# Patient Record
Sex: MALE | Race: WHITE | Employment: FULL TIME | URBAN - METROPOLITAN AREA
[De-identification: names, ages, dates, MRNs, and addresses within clinical notes are randomized per-mention and may not be internally consistent; named-entity substitution may affect disease eponyms.]

---

## 2017-02-03 RX ORDER — LOSARTAN POTASSIUM 100 MG/1
TABLET ORAL
Qty: 30 TABLET | Refills: 0 | Status: SHIPPED | OUTPATIENT
Start: 2017-02-03 | End: 2017-04-05 | Stop reason: SDUPTHER

## 2017-04-06 RX ORDER — LOSARTAN POTASSIUM 100 MG/1
TABLET ORAL
Qty: 30 TABLET | Refills: 0 | Status: SHIPPED | OUTPATIENT
Start: 2017-04-06 | End: 2017-05-08 | Stop reason: SDUPTHER

## 2017-05-09 RX ORDER — LOSARTAN POTASSIUM 100 MG/1
TABLET ORAL
Qty: 30 TABLET | Refills: 3 | Status: SHIPPED | OUTPATIENT
Start: 2017-05-09 | End: 2017-10-08 | Stop reason: SDUPTHER

## 2017-10-09 RX ORDER — LOSARTAN POTASSIUM 100 MG/1
TABLET ORAL
Qty: 30 TABLET | Refills: 3 | Status: SHIPPED | OUTPATIENT
Start: 2017-10-09 | End: 2017-10-24 | Stop reason: SDUPTHER

## 2017-10-09 NOTE — TELEPHONE ENCOUNTER
Health Maintenance   Topic Date Due    HIV screen  01/13/1994    DTaP/Tdap/Td vaccine (1 - Tdap) 01/13/1998    Flu vaccine (1) 09/01/2017       No results found for: LABA1C          ( goal A1C is < 7)   No results found for: LABMICR  LDL Cholesterol (mg/dL)   Date Value   08/27/2013 104 (H)       (goal LDL is <100)   AST (U/L)   Date Value   08/27/2013 28     ALT (U/L)   Date Value   08/27/2013 25     BUN (mg/dL)   Date Value   08/27/2013 16     BP Readings from Last 3 Encounters:   06/28/16 (!) 140/94   07/31/15 (!) 166/100   04/10/15 (!) 144/98          (goal 120/80)    All Future Testing planned in [facility]      Next Visit Date:  No future appointments. There is no problem list on file for this patient.

## 2017-10-16 ENCOUNTER — TELEPHONE (OUTPATIENT)
Dept: FAMILY MEDICINE CLINIC | Age: 37
End: 2017-10-16

## 2017-10-16 NOTE — TELEPHONE ENCOUNTER
The patient is calling to check the status for a request for refills for his Losartan. The patient stated that [facility] have been trying ot reach out several times to get some questions answered for his refills. Please contact patient about the status [phone].

## 2017-10-24 ENCOUNTER — OFFICE VISIT (OUTPATIENT)
Dept: FAMILY MEDICINE CLINIC | Age: 37
End: 2017-10-24
Payer: COMMERCIAL

## 2017-10-24 VITALS
WEIGHT: 256 LBS | SYSTOLIC BLOOD PRESSURE: 136 MMHG | HEIGHT: 72 IN | OXYGEN SATURATION: 98 % | BODY MASS INDEX: 34.67 KG/M2 | HEART RATE: 76 BPM | DIASTOLIC BLOOD PRESSURE: 86 MMHG | RESPIRATION RATE: 16 BRPM

## 2017-10-24 DIAGNOSIS — I10 ESSENTIAL HYPERTENSION: Primary | ICD-10-CM

## 2017-10-24 PROCEDURE — 99213 OFFICE O/P EST LOW 20 MIN: CPT | Performed by: FAMILY MEDICINE

## 2017-10-24 RX ORDER — LOSARTAN POTASSIUM AND HYDROCHLOROTHIAZIDE 25; 100 MG/1; MG/1
1 TABLET ORAL DAILY
Qty: 90 TABLET | Refills: 1 | Status: SHIPPED | OUTPATIENT
Start: 2017-10-24 | End: 2018-06-01 | Stop reason: SDUPTHER

## 2017-10-24 ASSESSMENT — PATIENT HEALTH QUESTIONNAIRE - PHQ9
SUM OF ALL RESPONSES TO PHQ9 QUESTIONS 1 & 2: 0
2. FEELING DOWN, DEPRESSED OR HOPELESS: 0
1. LITTLE INTEREST OR PLEASURE IN DOING THINGS: 0
SUM OF ALL RESPONSES TO PHQ QUESTIONS 1-9: 0

## 2018-01-29 ENCOUNTER — OFFICE VISIT (OUTPATIENT)
Dept: FAMILY MEDICINE CLINIC | Age: 38
End: 2018-01-29
Payer: COMMERCIAL

## 2018-01-29 VITALS
WEIGHT: 258 LBS | DIASTOLIC BLOOD PRESSURE: 86 MMHG | HEART RATE: 90 BPM | BODY MASS INDEX: 34.99 KG/M2 | SYSTOLIC BLOOD PRESSURE: 116 MMHG

## 2018-01-29 DIAGNOSIS — M77.9 TENDONITIS: ICD-10-CM

## 2018-01-29 DIAGNOSIS — I10 ESSENTIAL HYPERTENSION: Primary | ICD-10-CM

## 2018-01-29 PROCEDURE — 99213 OFFICE O/P EST LOW 20 MIN: CPT | Performed by: FAMILY MEDICINE

## 2018-01-29 ASSESSMENT — ENCOUNTER SYMPTOMS
COUGH: 0
DIARRHEA: 0
ABDOMINAL PAIN: 0
BACK PAIN: 0
SHORTNESS OF BREATH: 0
CONSTIPATION: 0
WHEEZING: 0
BLOOD IN STOOL: 0

## 2018-01-29 NOTE — PROGRESS NOTES
Ny Agrawal is a 43 y.o. male who presents today for his medical conditions/complaints as noted below. Ny Agrawal is c/o of Hypertension    Routine follow up on PL he is doing well. HPI:     HYPERTENSION visit     BP Readings from Last 3 Encounters:   01/29/18 116/86   10/24/17 136/86   06/28/16 (!) 140/94       LDL Cholesterol (mg/dL)   Date Value   08/27/2013 104 (H)     HDL (mg/dL)   Date Value   08/27/2013 35 (L)     BUN (mg/dL)   Date Value   08/27/2013 16     CREATININE (mg/dL)   Date Value   08/27/2013 0.91     Glucose (mg/dL)   Date Value   08/27/2013 89   12/22/2011 108 (H)              Have you changed or started any medications since your last visit including any over-the-counter medicines, vitamins, or herbal medicines? no   Have you stopped taking any of your medications? Is so, why? -  no  Are you having any side effects from any of your medications? - no    Have you seen any other physician or provider since your last visit?  no   Have you had any other diagnostic tests since your last visit?  no   Have you been seen in the emergency room and/or had an admission in a hospital since we last saw you?  no   Have you had your routine dental cleaning in the past 6 months?  yes -      Do you have an active MyChart account? If no, what is the barrier? No:     Patient Care Team:  Ernst Cabrales MD as PCP - General (Family Medicine)    Medical History Review  Past Medical, Family, and Social History reviewed and  contribute to the patient presenting condition    Health Maintenance   Topic Date Due    HIV screen  01/13/1994    DTaP/Tdap/Td vaccine (1 - Tdap) 01/13/1998    Potassium monitoring  12/22/2012    Creatinine monitoring  12/22/2012    Flu vaccine (1) 09/01/2017         Past Medical History:   Diagnosis Date    Hypertension       No past surgical history on file.   Family History   Problem Relation Age of Onset    Cancer Mother     High Blood Pressure Father      Social History

## 2018-06-03 RX ORDER — LOSARTAN POTASSIUM AND HYDROCHLOROTHIAZIDE 25; 100 MG/1; MG/1
1 TABLET ORAL DAILY
Qty: 90 TABLET | Refills: 1 | Status: SHIPPED | OUTPATIENT
Start: 2018-06-03 | End: 2018-11-29 | Stop reason: SDUPTHER

## 2019-03-30 NOTE — LETTER
Carl Hancock DO  [facility]  [address]  [address] [facility] [address]    4/1/2019    [address] [facility] [address]        Dear Josephine Redmond : In addition to helping you feel better when you are sick, we are interested in preventing illness and injury in the first place. In the spirit of maintaining your good health, your record indicates that you are due for an appointment. If you have changed physicians since Dr Emmet Shone [facility], please call the above number so we can update your record or schedule your appointment. Please call [phone] to schedule. I look forward to seeing you soon.     Sincerely,       Carl Hancock DO/mb

## 2019-04-01 RX ORDER — LOSARTAN POTASSIUM AND HYDROCHLOROTHIAZIDE 25; 100 MG/1; MG/1
TABLET ORAL
Qty: 90 TABLET | Refills: 0 | Status: SHIPPED | OUTPATIENT
Start: 2019-04-01 | End: 2019-07-01 | Stop reason: SDUPTHER

## 2019-04-01 NOTE — TELEPHONE ENCOUNTER
Last visit: 1/29/18  Last Med refill: 11/29/18  Does patient have enough medication for 72 hours:     Next Visit Date:  No future appointments.     Health Maintenance   Topic Date Due    HIV screen  01/13/1994    DTaP/Tdap/Td vaccine (1 - Tdap) 01/13/1998    Potassium monitoring  12/22/2012    Creatinine monitoring  12/22/2012    Lipid screen  01/13/2019    Flu vaccine (Season Ended) 09/01/2019       No results found for: LABA1C          ( goal A1C is < 7)   No results found for: LABMICR  LDL Cholesterol (mg/dL)   Date Value   08/27/2013 104 (H)   12/22/2011 119 (H)       (goal LDL is <100)   AST (U/L)   Date Value   08/27/2013 28     ALT (U/L)   Date Value   08/27/2013 25     BUN (mg/dL)   Date Value   08/27/2013 16     BP Readings from Last 3 Encounters:   01/29/18 116/86   10/24/17 136/86   06/28/16 (!) 140/94          (goal 120/80)    All Future Testing planned in [facility]  Lab Frequency Next Occurrence               Patient Active Problem List:     Essential hypertension

## 2019-04-11 ENCOUNTER — OFFICE VISIT (OUTPATIENT)
Dept: FAMILY MEDICINE CLINIC | Age: 39
End: 2019-04-11
Payer: COMMERCIAL

## 2019-04-11 VITALS
BODY MASS INDEX: 34.56 KG/M2 | WEIGHT: 254.8 LBS | DIASTOLIC BLOOD PRESSURE: 84 MMHG | HEART RATE: 83 BPM | SYSTOLIC BLOOD PRESSURE: 126 MMHG | OXYGEN SATURATION: 98 %

## 2019-04-11 DIAGNOSIS — Z00.00 ROUTINE ADULT HEALTH MAINTENANCE: ICD-10-CM

## 2019-04-11 DIAGNOSIS — R10.32 LEFT LOWER QUADRANT PAIN: ICD-10-CM

## 2019-04-11 DIAGNOSIS — I10 ESSENTIAL HYPERTENSION: Primary | ICD-10-CM

## 2019-04-11 PROCEDURE — 99213 OFFICE O/P EST LOW 20 MIN: CPT | Performed by: FAMILY MEDICINE

## 2019-04-11 ASSESSMENT — PATIENT HEALTH QUESTIONNAIRE - PHQ9
SUM OF ALL RESPONSES TO PHQ QUESTIONS 1-9: 0
SUM OF ALL RESPONSES TO PHQ QUESTIONS 1-9: 0
1. LITTLE INTEREST OR PLEASURE IN DOING THINGS: 0
2. FEELING DOWN, DEPRESSED OR HOPELESS: 0
SUM OF ALL RESPONSES TO PHQ9 QUESTIONS 1 & 2: 0

## 2019-04-11 NOTE — PROGRESS NOTES
Subjective:      Patient ID: Jasmina Garza is a 35 y.o. male. HPI    Here for evaluation of HTN    Notes that he is concerned about recent recalls but his particular lot is not part of the recall. Currently on losartan/HCTZ  Complains of left sided hip pain and goes into the abdomen. No injury. This has been occurring for a few months. Comes and goes. Generally most noticeable when he is walking, especially up and down stairs. No relieving factors. Review of Systems   Except as noted in HPI, ROS negative    Objective:   Physical Exam   Constitutional: He appears well-developed and well-nourished. No distress. HENT:   Head: Normocephalic and atraumatic. Eyes: Conjunctivae are normal.   Cardiovascular: Normal rate, regular rhythm and normal heart sounds. Pulmonary/Chest: Effort normal. He has no wheezes. Abdominal: Soft. He exhibits no distension. There is tenderness (very slight pain LLQ). Musculoskeletal: He exhibits no edema. No pain with ROM of the left hip. No reproducible pain with palpation of the left hip   Neurological: He is alert. Skin: Skin is warm and dry. Psychiatric: He has a normal mood and affect. His behavior is normal. Judgment and thought content normal.   Vitals reviewed. Assessment:      1. Essential hypertension    2. Left lower quadrant pain    3. Routine adult health maintenance          Plan:      1. Well controlled. We'll continue the losartan for now. We discussed if his does end up getting recalled we'll just switch it to something like olmesartan or irbesartan with HCTZ  2. He had this pain years ago and got a CT abdomen by chart review. Shown to have epiploic appendagitis. This could be this again. He does not want to work it up right now but we may consider abdominal imaging or hip xray if he desires.          Karyn Navarro, DO

## 2019-04-11 NOTE — PROGRESS NOTES
HYPERTENSION visit     BP Readings from Last 3 Encounters:   01/29/18 116/86   10/24/17 136/86   06/28/16 (!) 140/94       LDL Cholesterol (mg/dL)   Date Value   08/27/2013 104 (H)     HDL (mg/dL)   Date Value   08/27/2013 35 (L)     BUN (mg/dL)   Date Value   08/27/2013 16     CREATININE (mg/dL)   Date Value   08/27/2013 0.91     Glucose (mg/dL)   Date Value   08/27/2013 89   12/22/2011 108 (H)              Have you changed or started any medications since your last visit including any over-the-counter medicines, vitamins, or herbal medicines? no   Have you stopped taking any of your medications? Is so, why? -  no  Are you having any side effects from any of your medications? - no  How often do you miss doses of your medication? rare      Have you seen any other physician or provider since your last visit?  no   Have you had any other diagnostic tests since your last visit?  no   Have you been seen in the emergency room and/or had an admission in a hospital since we last saw you?  no   Have you had your routine dental cleaning in the past 6 months?  yes -      Do you have an active MyChart account? If no, what is the barrier?   No:     Patient Care Team:  Julienne Cervantes DO as PCP - Mark Mccrary MD as PCP - [facility] Attributed Provider    Medical History Review  Past Medical, Family, and Social History reviewed and  contribute to the patient presenting condition    Health Maintenance   Topic Date Due    HIV screen  01/13/1994    DTaP/Tdap/Td vaccine (1 - Tdap) 01/13/1998    Potassium monitoring  12/22/2012    Creatinine monitoring  12/22/2012    Lipid screen  01/13/2019    Flu vaccine (Season Ended) 09/01/2019    Pneumococcal 0-64 years Vaccine  Aged Out

## 2019-04-18 ENCOUNTER — HOSPITAL ENCOUNTER (EMERGENCY)
Age: 39
Discharge: HOME | End: 2019-04-18
Attending: EMERGENCY MEDICINE
Payer: COMMERCIAL

## 2019-04-18 ENCOUNTER — HOSPITAL ENCOUNTER (OUTPATIENT)
Age: 39
Discharge: HOME | End: 2019-04-18
Payer: COMMERCIAL

## 2019-04-18 VITALS
BODY MASS INDEX: 33.18 KG/M2 | WEIGHT: 245 LBS | HEART RATE: 88 BPM | RESPIRATION RATE: 14 BRPM | OXYGEN SATURATION: 96 % | TEMPERATURE: 98.4 F | DIASTOLIC BLOOD PRESSURE: 107 MMHG | HEIGHT: 72 IN | SYSTOLIC BLOOD PRESSURE: 148 MMHG

## 2019-04-18 DIAGNOSIS — Z00.00 ROUTINE ADULT HEALTH MAINTENANCE: ICD-10-CM

## 2019-04-18 DIAGNOSIS — S01.511A LIP LACERATION, INITIAL ENCOUNTER: Primary | ICD-10-CM

## 2019-04-18 LAB
ALBUMIN SERPL-MCNC: 4.4 G/DL (ref 3.5–5.2)
ALBUMIN/GLOBULIN RATIO: 1.4 (ref 1–2.5)
ALP BLD-CCNC: 110 U/L (ref 40–129)
ALT SERPL-CCNC: 32 U/L (ref 5–41)
ANION GAP SERPL CALCULATED.3IONS-SCNC: 12 MMOL/L (ref 9–17)
AST SERPL-CCNC: 25 U/L
BILIRUB SERPL-MCNC: 0.4 MG/DL (ref 0.3–1.2)
BUN BLDV-MCNC: 18 MG/DL (ref 6–20)
BUN/CREAT BLD: ABNORMAL (ref 9–20)
CALCIUM SERPL-MCNC: 9.3 MG/DL (ref 8.6–10.4)
CHLORIDE BLD-SCNC: 105 MMOL/L (ref 98–107)
CHOLESTEROL/HDL RATIO: 5.9
CHOLESTEROL: 200 MG/DL
CO2: 23 MMOL/L (ref 20–31)
CREAT SERPL-MCNC: 0.94 MG/DL (ref 0.7–1.2)
GFR AFRICAN AMERICAN: >60 ML/MIN
GFR NON-AFRICAN AMERICAN: >60 ML/MIN
GFR SERPL CREATININE-BSD FRML MDRD: ABNORMAL ML/MIN/{1.73_M2}
GFR SERPL CREATININE-BSD FRML MDRD: ABNORMAL ML/MIN/{1.73_M2}
GLUCOSE BLD-MCNC: 105 MG/DL (ref 70–99)
HDLC SERPL-MCNC: 34 MG/DL
LDL CHOLESTEROL: 129 MG/DL (ref 0–130)
POTASSIUM SERPL-SCNC: 3.6 MMOL/L (ref 3.7–5.3)
SODIUM BLD-SCNC: 140 MMOL/L (ref 135–144)
TOTAL PROTEIN: 7.5 G/DL (ref 6.4–8.3)
TRIGL SERPL-MCNC: 185 MG/DL
VLDLC SERPL CALC-MCNC: ABNORMAL MG/DL (ref 1–30)

## 2019-04-18 PROCEDURE — 99282 EMERGENCY DEPT VISIT SF MDM: CPT

## 2019-04-18 PROCEDURE — 90471 IMMUNIZATION ADMIN: CPT | Performed by: PHYSICIAN ASSISTANT

## 2019-04-18 PROCEDURE — 2500000003 HC RX 250 WO HCPCS: Performed by: PHYSICIAN ASSISTANT

## 2019-04-18 PROCEDURE — 80053 COMPREHEN METABOLIC PANEL: CPT

## 2019-04-18 PROCEDURE — 90715 TDAP VACCINE 7 YRS/> IM: CPT | Performed by: PHYSICIAN ASSISTANT

## 2019-04-18 PROCEDURE — 36415 COLL VENOUS BLD VENIPUNCTURE: CPT

## 2019-04-18 PROCEDURE — 80061 LIPID PANEL: CPT

## 2019-04-18 PROCEDURE — 6360000002 HC RX W HCPCS: Performed by: PHYSICIAN ASSISTANT

## 2019-04-18 PROCEDURE — 12011 RPR F/E/E/N/L/M 2.5 CM/<: CPT

## 2019-04-18 RX ORDER — LIDOCAINE HYDROCHLORIDE 10 MG/ML
5 INJECTION, SOLUTION EPIDURAL; INFILTRATION; INTRACAUDAL; PERINEURAL ONCE
Status: COMPLETED | OUTPATIENT
Start: 2019-04-18 | End: 2019-04-18

## 2019-04-18 RX ORDER — LOSARTAN POTASSIUM 100 MG/1
100 TABLET ORAL DAILY
COMMUNITY
End: 2019-10-07 | Stop reason: ALTCHOICE

## 2019-04-18 RX ADMIN — TETANUS TOXOID, REDUCED DIPHTHERIA TOXOID AND ACELLULAR PERTUSSIS VACCINE, ADSORBED 0.5 ML: 5; 2.5; 8; 8; 2.5 SUSPENSION INTRAMUSCULAR at 21:01

## 2019-04-18 RX ADMIN — LIDOCAINE HYDROCHLORIDE 5 ML: 10 INJECTION, SOLUTION EPIDURAL; INFILTRATION; INTRACAUDAL; PERINEURAL at 21:01

## 2019-04-18 ASSESSMENT — ENCOUNTER SYMPTOMS
VOMITING: 0
BACK PAIN: 0
COUGH: 0
SORE THROAT: 0
SHORTNESS OF BREATH: 0
EYE DISCHARGE: 0
ABDOMINAL PAIN: 0
NAUSEA: 0
EYE REDNESS: 0

## 2019-04-18 ASSESSMENT — PAIN SCALES - GENERAL: PAINLEVEL_OUTOF10: 3

## 2019-04-19 NOTE — ED PROVIDER NOTES
[facility] ED  [address] [facility] [place] [address]  Phone: [phone]  Fax: [phone]      Pt Name: Zuri De Jesus  MRN: [id number]  [place] 1980  Date of evaluation: 4/18/2019      CHIEF COMPLAINT       Chief Complaint   Patient presents with    Lip Laceration     bottom       HISTORY OF PRESENT ILLNESS   (Location, Quality, Severity, Duration, Timing, Context, ModifyingFactors, Associated Signs and Symptoms)     Zuri De Jesus is a 35 y.o. male who presents to the ER for evaluation of a lip laceration. 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 This injury occurred around 6:45 PM this evening. Patient sustained a laceration to the inner aspect of the lower lip. Patient had no loss of consciousness. He denies injury to the teeth and tongue. He denies acute pain at the current time. He is uncertain as to the date of his last tetanus. Nursing Notes were reviewed. REVIEW OF SYSTEMS     (2-9 systems for level 4, 10 or more for level 5)    Review of Systems   Constitutional: Negative for chills and fever. HENT: Negative for congestion, ear pain and sore throat. Lip laceration. Eyes: Negative for discharge and redness. Respiratory: Negative for cough and shortness of breath. Cardiovascular: Negative for chest pain. Gastrointestinal: Negative for abdominal pain, nausea and vomiting. Genitourinary: Negative for decreased urine volume. Musculoskeletal: Negative for back pain. Neurological: Negative for seizures and syncope. All other systems reviewed and are negative. PAST MEDICAL HISTORY    has a past medical history of Hypertension. SURGICAL HISTORY     Unknown.     CURRENT MEDICATIONS       Discharge Medication List as of 4/18/2019  8:56 PM      CONTINUE these medications which have NOT CHANGED    Details   losartan (COZAAR) 100 MG tablet Take 100 mg by mouth dailyHistorical Med      losartan-hydrochlorothiazide (HYZAAR) 100-25 MG per tablet TAKE 1 TABLET DAILY, Disp-90 tablet, R-0Due for appointment please call [phone] to scheduleNormal           ALLERGIES     is allergic to ace inhibitors. FAMILY HISTORY     indicated that his mother is alive. He indicated that his father is alive. family history includes Cancer in his mother; High Blood Pressure in his father. SOCIAL HISTORY      reports that he has never smoked. He has never used smokeless tobacco. He reports that he does not drink alcohol or use drugs. PHYSICAL EXAM     (7 for level 4, 8 or more for level 5)    ED Triage Vitals [04/18/19 2022]   BP Temp Temp Source Pulse Resp SpO2 Height Weight   (!) 148/107 98.4 °F (36.9 °C) Oral 88 14 96 % 6' (1.829 m) 245 lb (111.1 kg)     Physical Exam   Constitutional: He appears well-developed and well-nourished. No distress. Nontoxic. HENT:   Stellate inner lower lip laceration measuring 2.5 cm; no active bleeding. No injury to the teeth or tongue. Eyes: No scleral icterus. Cardiovascular: Normal rate. Pulmonary/Chest: Effort normal. No respiratory distress. Musculoskeletal:   Normal ambulation. Neurological: He is alert. Grossly intact. Skin: Skin is warm. Psychiatric: He has a normal mood and affect. His behavior is normal.   Vitals reviewed. DIAGNOSTIC RESULTS     LABS:  No results found for this visit on 04/18/19. MDM:   Patient presents to the ER for evaluation of an inner lower lip laceration. Patient states he was accidentally hit in the mouth. On exam, he has a stellate inner lower lip laceration measuring 2.5 cm. The laceration will require repair with sutures. Patient's tetanus will be updated.     EMERGENCY DEPARTMENT COURSE:   Vitals:    Vitals:    04/18/19 2022   BP: (!) 148/107   Pulse: 88   Resp: 14   Temp: 98.4 °F (36.9 °C)   TempSrc: Oral   SpO2: 96%   Weight: 111.1 kg (245 lb)   Height: 6' (1.829 m) -------------------------  BP: (!) 148/107, Temp: 98.4 °F (36.9 °C), Pulse: 88, Resp: 14    The patient was given the following medications:  Orders Placed This Encounter   Medications    lidocaine PF 1 % injection 5 mL    Tetanus-Diphth-Acell Pertussis (BOOSTRIX) injection 0.5 mL      PROCEDURES  Laceration Repair:  Verbal consent was obtained from the patient. Laceration description (see physical examination). The laceration was prepped with Betadine. Anesthesia was achieved with 2.5 cc of 1% lidocaine. The wound was irrigated normal saline and explored. No foreign bodies were detected. The wound edges were reapproximated using 4 simple interrupted 6-0 Vicryl sutures. The patient tolerated the procedure well. No complications. Wound care instructions were discussed with the patient. Patient understands that these are absorbable sutures. Normally they will dissolve in 7-10 days. If the sutures remain after 10 days, patient should have them removed by his primary care physician. FINAL IMPRESSION      1.  Lip laceration, initial encounter        DISPOSITION/PLAN   DISPOSITION Decision To Discharge 04/18/2019 08:51:39 PM    Condition on Disposition  Stable    PATIENT REFERRED TO:  DO Silver Smith   [address], [address] [facility]  [phone]    Schedule an appointment as soon as possible for a visit   For reevaluation in 3-4 days    DISCHARGE MEDICATIONS:  Discharge Medication List as of 4/18/2019  8:56 PM        (Please note that portions of this note were completed with a voice recognition program.  Efforts were made to edit the dictations but occasionally words are mis-transcribed.)    Daniel Torres PA-C  04/18/19 1942

## 2019-07-02 RX ORDER — LOSARTAN POTASSIUM AND HYDROCHLOROTHIAZIDE 25; 100 MG/1; MG/1
1 TABLET ORAL DAILY
Qty: 90 TABLET | Refills: 3 | Status: SHIPPED | OUTPATIENT
Start: 2019-07-02 | End: 2020-07-17 | Stop reason: SDUPTHER

## 2019-09-30 ENCOUNTER — HOSPITAL ENCOUNTER (EMERGENCY)
Age: 39
Discharge: HOME | End: 2019-09-30
Attending: EMERGENCY MEDICINE
Payer: COMMERCIAL

## 2019-09-30 VITALS
DIASTOLIC BLOOD PRESSURE: 83 MMHG | HEIGHT: 72 IN | SYSTOLIC BLOOD PRESSURE: 142 MMHG | OXYGEN SATURATION: 94 % | WEIGHT: 230 LBS | RESPIRATION RATE: 14 BRPM | TEMPERATURE: 98.6 F | BODY MASS INDEX: 31.15 KG/M2 | HEART RATE: 93 BPM

## 2019-09-30 DIAGNOSIS — S00.432A HEMATOMA OF LEFT EAR, INITIAL ENCOUNTER: Primary | ICD-10-CM

## 2019-09-30 PROCEDURE — 99282 EMERGENCY DEPT VISIT SF MDM: CPT

## 2019-09-30 PROCEDURE — 6370000000 HC RX 637 (ALT 250 FOR IP)

## 2019-09-30 PROCEDURE — 10140 I&D HMTMA SEROMA/FLUID COLLJ: CPT

## 2019-09-30 RX ADMIN — Medication 3 ML: at 20:52

## 2019-10-07 ENCOUNTER — OFFICE VISIT (OUTPATIENT)
Dept: FAMILY MEDICINE CLINIC | Age: 39
End: 2019-10-07
Payer: COMMERCIAL

## 2019-10-07 ENCOUNTER — HOSPITAL ENCOUNTER (OUTPATIENT)
Dept: GENERAL RADIOLOGY | Age: 39
Discharge: HOME | End: 2019-10-09
Payer: COMMERCIAL

## 2019-10-07 ENCOUNTER — HOSPITAL ENCOUNTER (OUTPATIENT)
Age: 39
Discharge: HOME | End: 2019-10-09
Payer: COMMERCIAL

## 2019-10-07 VITALS
OXYGEN SATURATION: 99 % | DIASTOLIC BLOOD PRESSURE: 80 MMHG | BODY MASS INDEX: 32.69 KG/M2 | WEIGHT: 241 LBS | SYSTOLIC BLOOD PRESSURE: 132 MMHG | HEART RATE: 76 BPM

## 2019-10-07 DIAGNOSIS — M25.562 LEFT KNEE PAIN, UNSPECIFIED CHRONICITY: Primary | ICD-10-CM

## 2019-10-07 DIAGNOSIS — M25.562 LEFT KNEE PAIN, UNSPECIFIED CHRONICITY: ICD-10-CM

## 2019-10-07 PROCEDURE — 73562 X-RAY EXAM OF KNEE 3: CPT

## 2019-10-07 PROCEDURE — 99213 OFFICE O/P EST LOW 20 MIN: CPT | Performed by: FAMILY MEDICINE

## 2019-10-14 ENCOUNTER — TELEPHONE (OUTPATIENT)
Dept: FAMILY MEDICINE CLINIC | Age: 39
End: 2019-10-14

## 2019-10-14 DIAGNOSIS — M25.562 LEFT KNEE PAIN, UNSPECIFIED CHRONICITY: Primary | ICD-10-CM

## 2020-04-21 ENCOUNTER — TELEPHONE (OUTPATIENT)
Dept: FAMILY MEDICINE CLINIC | Age: 40
End: 2020-04-21

## 2020-04-21 ENCOUNTER — TELEMEDICINE (OUTPATIENT)
Dept: FAMILY MEDICINE CLINIC | Age: 40
End: 2020-04-21
Payer: COMMERCIAL

## 2020-04-21 VITALS — DIASTOLIC BLOOD PRESSURE: 80 MMHG | SYSTOLIC BLOOD PRESSURE: 120 MMHG

## 2020-04-21 PROCEDURE — 99204 OFFICE O/P NEW MOD 45 MIN: CPT | Performed by: NURSE PRACTITIONER

## 2020-04-21 RX ORDER — PREDNISONE 10 MG/1
TABLET ORAL
Qty: 15 TABLET | Refills: 0 | Status: SHIPPED | OUTPATIENT
Start: 2020-04-21 | End: 2020-04-21 | Stop reason: SDUPTHER

## 2020-04-21 RX ORDER — PHENOL 1.4 %
1 AEROSOL, SPRAY (ML) MUCOUS MEMBRANE NIGHTLY
Qty: 90 TABLET | Refills: 3 | Status: ON HOLD | OUTPATIENT
Start: 2020-04-21 | End: 2020-06-26 | Stop reason: ALTCHOICE

## 2020-04-21 RX ORDER — PREDNISONE 10 MG/1
TABLET ORAL
Qty: 31 TABLET | Refills: 0 | Status: SHIPPED | OUTPATIENT
Start: 2020-04-21 | End: 2020-05-21 | Stop reason: ALTCHOICE

## 2020-04-21 RX ORDER — VENLAFAXINE HYDROCHLORIDE 37.5 MG/1
37.5 CAPSULE, EXTENDED RELEASE ORAL DAILY
Qty: 45 CAPSULE | Refills: 1 | Status: SHIPPED | OUTPATIENT
Start: 2020-04-21 | End: 2020-05-05 | Stop reason: SINTOL

## 2020-04-21 ASSESSMENT — PATIENT HEALTH QUESTIONNAIRE - PHQ9
2. FEELING DOWN, DEPRESSED OR HOPELESS: 0
1. LITTLE INTEREST OR PLEASURE IN DOING THINGS: 1
SUM OF ALL RESPONSES TO PHQ QUESTIONS 1-9: 1
SUM OF ALL RESPONSES TO PHQ QUESTIONS 1-9: 1
SUM OF ALL RESPONSES TO PHQ9 QUESTIONS 1 & 2: 1

## 2020-04-21 NOTE — PROGRESS NOTES
during his visit. He did have good ROM of right shoulder observed while driving      Neurological:        [x] No Facial Asymmetry (Cranial nerve 7 motor function) (limited exam to video visit)          [x] No gaze palsy        [] Abnormal-         Skin:        [x] No significant exanthematous lesions or discoloration noted on facial skin         [] Abnormal-            Psychiatric:       [x] Normal Affect [x] No Hallucinations        [] Abnormal-     Other pertinent observable physical exam findings-     ASSESSMENT/PLAN:  1. Chronic right shoulder pain  - will not be able to continue PT at this time d/t COVID 19  - Will defer dx studies at this time  - consider short term tx with gabapentin if no relief after the 10 days of steroids - only to bridge to PT  - DDx: nerve impingement   - predniSONE (DELTASONE) 10 MG tablet; [id number] take 4 tablets daily 2 hours between doses, day6,7 take 3tablets daily, day8,9 take 2 tablets daily, day10 take 1 tablet  Dispense: 15 tablet; Refill: 0    2. Anxiety  - discussed relaxation techniques   - declines referral for counseling at this time  - venlafaxine (EFFEXOR XR) 37.5 MG extended release capsule; Take 1 capsule by mouth daily Increase to 2 capsules by mouth daily after 1st week if no adverse affects  Dispense: 45 capsule; Refill: 1  - CBC; Future  - Comprehensive Metabolic Panel; Future    3. Sleep disturbance  - reviewed sleep hygiene  - Melatonin 10 MG TABS; Take 1 tablet by mouth nightly  Dispense: 90 tablet; Refill: 3    4. Screening cholesterol level  - Lipid, Fasting; Future    5. Encounter for screening for HIV  - HIV Screen; Future  - Comprehensive Metabolic Panel; Future    6. Thyroid disorder screening  - TSH; Future  - T4, Free; Future        1. Alcon received counseling on the following healthy behaviors: nutrition, exercise and medication adherence  2. Patient given educational materials - see patient instructions  3.   Was a self-tracking handout given in paper form or via [employer]? No  If yes, see orders or list here. 4.  Discussed use, benefit, and side effects of prescribed medications. Barriers to medication compliance addressed. All patient questions answered. Pt voiced understanding. 5.  Reviewed prior labs, imaging, consultation, follow up, and health maintenance  6. Continue current medications, diet and exercise. 7. Discussed use, benefit, and side effects of prescribed medications. Barriers to medication compliance addressed. All her questions were answered. Pt voiced understanding. Ney Cortés will continue current medications, diet and exercise. Return in about 2 weeks (around 5/5/2020) for anxiety, right shoulder pain. Orders Placed This Encounter   Procedures    CBC     Standing Status:   Future     Standing Expiration Date:   4/21/2021    HIV Screen     Standing Status:   Future     Standing Expiration Date:   4/21/2021    Comprehensive Metabolic Panel     Standing Status:   Future     Standing Expiration Date:   4/21/2021    Lipid, Fasting     Standing Status:   Future     Standing Expiration Date:   4/21/2021    TSH     Standing Status:   Future     Standing Expiration Date:   4/21/2021    T4, Free     Standing Status:   Future     Standing Expiration Date:   4/21/2021       Of the 45 minute duration appointment visit, Christal Brunner, CNP spent at greater than 50% of the face-to-face time in counseling, explanation of diagnosis, planning of further management, and answering all questions. Signed:  Christal Brunner, CNP      Pako Russell is a 38 y.o. male being evaluated by a Virtual Visit (video visit) encounter to address concerns as mentioned above. A caregiver was present when appropriate. Due to this being a TeleHealth encounter (During [facility] public health emergency), evaluation of the following organ systems was limited: Vitals/Constitutional/EENT/Resp/CV/GI//MS/Neuro/Skin/Heme-Lymph-Imm.   Pursuant to the emergency declaration under the [facility], [address] [place] [address] wa[facility] for [facility] authority and the [employer] Act, this Virtual Visit was conducted with patient's (and/or legal guardian's) consent, to reduce the patient's risk of exposure to COVID-19 and provide necessary medical care. The patient (and/or legal guardian) has also been advised to contact this office for worsening conditions or problems, and seek emergency medical treatment and/or call 911 if deemed necessary. Services were provided through a video synchronous discussion virtually to substitute for in-person clinic visit. Patient and provider were located at their individual homes. --KEENA Huddleston CNP on 4/21/2020 at 6:26 PM    An electronic signature was used to authenticate this note.

## 2020-04-22 ENCOUNTER — TELEPHONE (OUTPATIENT)
Dept: FAMILY MEDICINE CLINIC | Age: 40
End: 2020-04-22

## 2020-05-05 ENCOUNTER — TELEMEDICINE (OUTPATIENT)
Dept: FAMILY MEDICINE CLINIC | Age: 40
End: 2020-05-05
Payer: COMMERCIAL

## 2020-05-05 PROCEDURE — 99214 OFFICE O/P EST MOD 30 MIN: CPT | Performed by: NURSE PRACTITIONER

## 2020-05-05 RX ORDER — BUSPIRONE HYDROCHLORIDE 5 MG/1
5 TABLET ORAL 2 TIMES DAILY
Qty: 60 TABLET | Refills: 3 | Status: SHIPPED | OUTPATIENT
Start: 2020-05-05 | End: 2020-06-04

## 2020-05-05 NOTE — PROGRESS NOTES
2020    TELEHEALTH EVALUATION -- Audio/Visual (During [place] [address] public health emergency)    Chief Complaint   Patient presents with    Medication Check         HPI:    Madina Carranza (:  1980) has requested an audio/video evaluation for the following concern(s):    HTN   - monitors b/p 120's/80's   - since taking steroids, he has had a few readings that were 160's and his heart rate went up to 120's   - he completed the steroids yesterday and feels that this is starting to go away   -weight loss - down to 215# with healthier diet and exercise    Anxiety   - started Effexor for anxiety and feels that he is not sure if it is working due to taking the steroids and does not want to take as his b/p and heart rate went up when taking   - he reports the pharmacy only gave him 7 days of the 37.5mg capsules and then 75mg capsules. He stopped taking them due to s/e and did not have the 37.5mg capsules available to take. He no longer wants to take this medication    Right Shoulder pain   - pain has resolved for the past 4-5 days   - not waking him up during the night   - he feels the numbness and tingling is gone    Review of Systems   All other systems reviewed and are negative. Prior to Visit Medications    Medication Sig Taking?  Authorizing Provider   busPIRone (BUSPAR) 5 MG tablet Take 1 tablet by mouth 2 times daily Yes KEENA Giles CNP   Melatonin 10 MG TABS Take 1 tablet by mouth nightly  KEENA Fitzgerald CNP   predniSONE (DELTASONE) 10 MG tablet [id number] take 4 tablets daily 2 hours between doses, day6,7 take 3tablets daily, day8,9 take 2 tablets daily, day10 take 1 tablet  Thomas KEENA Page CNP   losartan-hydrochlorothiazide (HYZAAR) 100-25 MG per tablet Take 1 tablet by mouth daily  Jaylin Loving DO       Social History     Tobacco Use    Smoking status: Never Smoker    Smokeless tobacco: Never Used   Substance Use Topics    Alcohol use: No    Drug use: No        Allergies Allergen Reactions    Ace Inhibitors      cough   ,   Past Medical History:   Diagnosis Date    Hypertension    , No past surgical history on file. PHYSICAL EXAMINATION:  Vital Signs: (As obtained by patient/caregiver or practitioner observation)    Wt Readings from Last 3 Encounters:   10/07/19 241 lb (109.3 kg)   09/30/19 230 lb (104.3 kg)   04/18/19 245 lb (111.1 kg)     Temp Readings from Last 3 Encounters:   09/30/19 98.6 °F (37 °C) (Oral)   04/18/19 98.4 °F (36.9 °C) (Oral)   06/28/16 (!) 60.8 °F (16 °C)     BP Readings from Last 3 Encounters:   04/21/20 120/80   10/07/19 132/80   09/30/19 (!) 142/83     Pulse Readings from Last 3 Encounters:   10/07/19 76   09/30/19 93   04/18/19 88         Constitutional: [x] Appears well-developed and well-nourished [x] No apparent distress      [] Abnormal-   Mental status  [x] Alert and awake  [x] Oriented to person/place/time [x]Able to follow commands      Eyes:  EOM    [x]  Normal  [] Abnormal-  Sclera  [x]  Normal  [] Abnormal -         Discharge [x]  None visible  [] Abnormal -    HENT:   [x] Normocephalic, atraumatic.   [] Abnormal   [x] Mouth/Throat: Mucous membranes are moist.     External Ears [x] Normal  [] Abnormal-     Neck: [x] No visualized mass     Pulmonary/Chest: [x] Respiratory effort normal.  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

## 2020-05-21 ENCOUNTER — OFFICE VISIT (OUTPATIENT)
Dept: FAMILY MEDICINE CLINIC | Age: 40
End: 2020-05-21
Payer: COMMERCIAL

## 2020-05-21 VITALS
OXYGEN SATURATION: 98 % | TEMPERATURE: 96.7 F | HEART RATE: 89 BPM | SYSTOLIC BLOOD PRESSURE: 118 MMHG | DIASTOLIC BLOOD PRESSURE: 78 MMHG | WEIGHT: 231.4 LBS | BODY MASS INDEX: 31.38 KG/M2

## 2020-05-21 PROCEDURE — 99214 OFFICE O/P EST MOD 30 MIN: CPT | Performed by: NURSE PRACTITIONER

## 2020-05-21 PROCEDURE — 93000 ELECTROCARDIOGRAM COMPLETE: CPT | Performed by: NURSE PRACTITIONER

## 2020-05-21 RX ORDER — PANTOPRAZOLE SODIUM 20 MG/1
20 TABLET, DELAYED RELEASE ORAL
Qty: 30 TABLET | Refills: 0 | Status: SHIPPED | OUTPATIENT
Start: 2020-05-21 | End: 2020-06-25 | Stop reason: SDUPTHER

## 2020-05-21 NOTE — PATIENT INSTRUCTIONS
is a key part of your treatment and safety. Be sure to make and go to all appointments, and call your doctor if you are having problems. It's also a good idea to know your test results and keep a list of the medicines you take. How can you care for yourself at home? · Rest in a quiet, dark room with a cool cloth on your forehead until your headache is gone. Close your eyes, and try to relax or go to sleep. Don't watch TV or read. Avoid using the computer. · Use a warm, moist towel or a heating pad set on low to relax tight shoulder and neck muscles. · Have someone gently massage your neck and shoulders. · Take pain medicines exactly as directed. ? If the doctor gave you a prescription medicine for pain, take it as prescribed. ? If you are not taking a prescription pain medicine, ask your doctor if you can take an over-the-counter medicine. · Be careful not to take pain medicine more often than the instructions allow, because you may get worse or more frequent headaches when the medicine wears off. · If you get another tension headache, stop what you are doing and sit quietly for a moment. Close your eyes and breathe slowly. Try to relax your head and neck muscles. · Do not ignore new symptoms that occur with a headache, such as fever, weakness or numbness, vision changes, or confusion. These may be signs of a more serious problem. To help prevent headaches  · Keep a headache diary so you can figure out what triggers your headaches. Avoiding triggers may help you prevent headaches. Record when each headache began, how long it lasted, and what the pain was like (throbbing, aching, stabbing, or dull). List anything that may have triggered the headache, such as being physically or emotionally stressed or being anxious or depressed. Other possible triggers are hunger, anger, fatigue, poor posture, and muscle strain. · Find healthy ways to deal with stress.  Headaches are most common during or right after stressful times. Take time to relax before and after you do something that has caused a headache in the past.  · Exercise daily to relieve stress. Relaxation exercises may help reduce tension. · Get plenty of sleep. · Eat regularly and well. Long periods without food can trigger a headache. · Treat yourself to a massage. Some people find that massages are very helpful in relieving tension. · Try to keep your muscles relaxed by keeping good posture. Check your jaw, face, neck, and shoulder muscles for tension, and try to relax them. When sitting at a desk, change positions often, and stretch for 30 seconds each hour. · Reduce eyestrain from computers by blinking frequently and looking away from the computer screen every so often. Make sure you have proper eyewear and that your monitor is set up properly, about an arm's length away. When should you call for help? Call 911 anytime you think you may need emergency care. For example, call if:    · You have signs of a stroke. These may include:  ? Sudden numbness, paralysis, or weakness in your face, arm, or leg, especially on only one side of your body. ? Sudden vision changes. ? Sudden trouble speaking. ? Sudden confusion or trouble understanding simple statements. ? Sudden problems with walking or balance. ? A sudden, severe headache that is different from past headaches.    Call your doctor now or seek immediate medical care if:    · You have new or worse nausea and vomiting.     · You have a new or higher fever.     · Your headache gets much worse.    Watch closely for changes in your health, and be sure to contact your doctor if:    · You are not getting better after 2 days (48 hours). Where can you learn more? Go to [email or web address].[email or web address] org and sign in to your [facility] account. Enter 04 [id number] 05 in the Slide box to learn more about \"Tension Headache: Care Instructions. \"     If you do not have an account, please click on the \"Sign Up Now\" link. Current as of: November 19, 2019Content Version: 12.4  © 4042-3321 [employer] Incorporated. Care instructions adapted under license by [facility] ([facility]). If you have questions about a medical condition or this instruction, always ask your healthcare professional. [address] any warranty or liability for your use of this information. Gastroesophageal Reflux Disease (GERD): Care Instructions  Your Care Instructions    Gastroesophageal reflux disease (GERD) is the backward flow of stomach acid into the esophagus. The esophagus is the tube that leads from your throat to your stomach. A one-way valve prevents the stomach acid from moving up into this tube. When you have GERD, this valve does not close tightly enough. If you have mild GERD symptoms including heartburn, you may be able to control the problem with antacids or over-the-counter medicine. Changing your diet, losing weight, and making other lifestyle changes can also help reduce symptoms. Follow-up care is a key part of your treatment and safety. Be sure to make and go to all appointments, and call your doctor if you are having problems. It's also a good idea to know your test results and keep a list of the medicines you take. How can you care for yourself at home? · Take your medicines exactly as prescribed. Call your doctor if you think you are having a problem with your medicine. · Your doctor may recommend over-the-counter medicine. For mild or occasional indigestion, antacids, such as Tums, Gaviscon, Mylanta, or Maalox, may help. Your doctor also may recommend over-the-counter acid reducers, such as Pepcid AC, Tagamet HB, Zantac 75, or Prilosec. Read and follow all instructions on the label. If you use these medicines often, talk with your doctor. · Change your eating habits. ? It's best to eat several small meals instead of two or three large meals. ?  After you eat, wait 2 to 3 hours before you

## 2020-05-21 NOTE — PROGRESS NOTES
Negative for ear pain, sore throat,  Rhinorrhea, sinus pain, sinus pressure, congestion. · Eyes:  Negative for pain and discharge. · Respiratory:  Negative for chest tightness, shortness of breath, wheezing, and cough. · Cardiovascular:  Negative for chest pain, palpitations and leg swelling. · Gastrointestinal: Negative for abdominal pain, blood in stool, constipation,diarrhea, nausea and vomiting. · Endocrine: Negative for cold intolerance, heat intolerance, polydipsia, polyphagia and polyuria. · Genitourinary: Negative for difficulty urinating, dysuria, flank pain, frequency, hematuria and urgency. · Musculoskeletal: Negative for arthralgias, back pain, joint swelling, myalgias, neck pain and neck stiffness. Positive for chest pain, right shoulder pain  · Skin: Negative for rash and wound. · Allergic/Immunologic: Negative for environmental allergies and food allergies. · Neurological:  Negative for dizziness, light-headedness, numbness and Positive for headaches. · Hematological:  Negative for adenopathy. Does not bruise/bleed easily. · Psychiatric/Behavioral: Negative for self-injury, sleep disturbance and suicidal ideas. Positive for anxiety    Objective     PHYSICAL EXAM:   · Constitutional: Portia Macias is oriented to person, place, and time. Vital signs are normal. Appears well-developed and well-nourished. · HEENT:   · Head: Normocephalic and atraumatic. Eyes:PERRL, EOMI, Conjunctiva normal, No discharge. · Neck: Full passive range of motion. · Cardiovascular: Normal rate, regular rhythm, S1, S2, no murmur, no gallop, no friction rub, intact distal pulses. · Pulmonary/Chest: Breath sounds are clear throughout, No respiratory distress, No wheezing, No chest tenderness. Effort normal  · Abdominal: Soft. Normal appearance, bowel sounds are present and normoactive. There is no hepatosplenomegaly. There is no tenderness. There is no CVA tenderness.    · Musculoskeletal: Extremities

## 2020-05-22 LAB
ALBUMIN SERPL-MCNC: 4.4 G/DL
ALP BLD-CCNC: 80 U/L
ALT SERPL-CCNC: 21 U/L
ANION GAP SERPL CALCULATED.3IONS-SCNC: 8 MMOL/L
AST SERPL-CCNC: 19 U/L
BASOPHILS ABSOLUTE: 0.1 /ΜL
BASOPHILS RELATIVE PERCENT: 1 %
BILIRUB SERPL-MCNC: 0.5 MG/DL (ref 0.1–1.4)
BUN BLDV-MCNC: 17 MG/DL
CALCIUM SERPL-MCNC: 9.6 MG/DL
CHLORIDE BLD-SCNC: 102 MMOL/L
CHOLESTEROL, FASTING: 212
CO2: 30 MMOL/L
CREAT SERPL-MCNC: 0.9 MG/DL
EOSINOPHILS ABSOLUTE: 0.1 /ΜL
EOSINOPHILS RELATIVE PERCENT: 1.2 %
GFR CALCULATED: >60
GLUCOSE BLD-MCNC: 92 MG/DL
HCT VFR BLD CALC: 42.4 % (ref 41–53)
HDLC SERPL-MCNC: 39 MG/DL (ref 35–70)
HEMOGLOBIN: 14.2 G/DL (ref 13.5–17.5)
HIV AG/AB: NON REACTIVE
LDL CHOLESTEROL CALCULATED: 149 MG/DL (ref 0–160)
LYMPHOCYTES ABSOLUTE: 2.3 /ΜL
LYMPHOCYTES RELATIVE PERCENT: 26.6 %
MCH RBC QN AUTO: 30.2 PG
MCHC RBC AUTO-ENTMCNC: 33.5 G/DL
MCV RBC AUTO: 90 FL
MONOCYTES ABSOLUTE: 0.7 /ΜL
MONOCYTES RELATIVE PERCENT: 8.1 %
NEUTROPHILS ABSOLUTE: 5.5 /ΜL
NEUTROPHILS RELATIVE PERCENT: 63.1 %
PLATELET # BLD: 321 K/ΜL
PMV BLD AUTO: 8.2 FL
POTASSIUM SERPL-SCNC: 3.6 MMOL/L
RBC # BLD: 4.69 10^6/ΜL
SODIUM BLD-SCNC: 140 MMOL/L
T4 FREE: 0.98
TOTAL PROTEIN: 7.2
TRIGLYCERIDE, FASTING: 120
TSH SERPL DL<=0.05 MIU/L-ACNC: 0.54 UIU/ML
WBC # BLD: 8.8 10^3/ML

## 2020-06-06 ENCOUNTER — HOSPITAL ENCOUNTER (OUTPATIENT)
Dept: MRI IMAGING | Age: 40
Discharge: HOME | End: 2020-06-08
Payer: COMMERCIAL

## 2020-06-06 PROCEDURE — 73221 MRI JOINT UPR EXTREM W/O DYE: CPT

## 2020-06-15 ENCOUNTER — TELEPHONE (OUTPATIENT)
Dept: FAMILY MEDICINE CLINIC | Age: 40
End: 2020-06-15

## 2020-06-15 ENCOUNTER — TELEPHONE (OUTPATIENT)
Dept: GASTROENTEROLOGY | Age: 40
End: 2020-06-15

## 2020-06-15 DIAGNOSIS — K21.9 GASTROESOPHAGEAL REFLUX DISEASE WITHOUT ESOPHAGITIS: Primary | ICD-10-CM

## 2020-06-15 NOTE — TELEPHONE ENCOUNTER
Patient request referral to see a doctor for his GERD  Not getting any better has been watching diet

## 2020-06-19 ENCOUNTER — OFFICE VISIT (OUTPATIENT)
Dept: GASTROENTEROLOGY | Age: 40
End: 2020-06-19
Payer: COMMERCIAL

## 2020-06-19 VITALS — RESPIRATION RATE: 14 BRPM | TEMPERATURE: 98.1 F | BODY MASS INDEX: 30.2 KG/M2 | WEIGHT: 223 LBS | HEIGHT: 72 IN

## 2020-06-19 PROCEDURE — 99204 OFFICE O/P NEW MOD 45 MIN: CPT | Performed by: INTERNAL MEDICINE

## 2020-06-19 RX ORDER — PANTOPRAZOLE SODIUM 40 MG/1
40 TABLET, DELAYED RELEASE ORAL
Qty: 30 TABLET | Refills: 0 | Status: SHIPPED | OUTPATIENT
Start: 2020-06-19 | End: 2020-09-21

## 2020-06-19 ASSESSMENT — ENCOUNTER SYMPTOMS
WHEEZING: 0
SORE THROAT: 1
TROUBLE SWALLOWING: 0
NAUSEA: 1
CONSTIPATION: 0
ABDOMINAL DISTENTION: 0
RECTAL PAIN: 0
VOMITING: 0
BLOOD IN STOOL: 0
COUGH: 1
ABDOMINAL PAIN: 1
DIARRHEA: 0
CHOKING: 0
ANAL BLEEDING: 0

## 2020-06-19 NOTE — PROGRESS NOTES
for sore throat. Negative for trouble swallowing. Respiratory: Positive for cough. Negative for choking and wheezing. Cardiovascular: Positive for chest pain and palpitations. Negative for leg swelling. Gastrointestinal: Positive for abdominal pain and nausea. Negative for abdominal distention, anal bleeding, blood in stool, constipation, diarrhea, rectal pain and vomiting. Heartburn, acid in mouth   Genitourinary: Negative for difficulty urinating. Allergic/Immunologic: Negative for environmental allergies and food allergies. Neurological: Negative for dizziness, weakness, light-headedness, numbness and headaches. Hematological: Does not bruise/bleed easily. Psychiatric/Behavioral: Negative for sleep disturbance. The patient is not nervous/anxious. Objective:   Physical Exam  Vitals signs and nursing note reviewed. Constitutional:       General: He is not in acute distress. Appearance: He is well-developed. Comments: Mildly overweight patient. HENT:      Head: Normocephalic and atraumatic. Mouth/Throat:      Pharynx: No oropharyngeal exudate. Eyes:      General: No scleral icterus. Conjunctiva/sclera: Conjunctivae normal.      Pupils: Pupils are equal, round, and reactive to light. Neck:      Musculoskeletal: Normal range of motion and neck supple. Thyroid: No thyromegaly. Trachea: No tracheal deviation. Cardiovascular:      Rate and Rhythm: Normal rate and regular rhythm. Heart sounds: Normal heart sounds. No murmur. Pulmonary:      Effort: Pulmonary effort is normal. No respiratory distress. Breath sounds: Normal breath sounds. No wheezing or rales. Abdominal:      General: Bowel sounds are normal. There is no distension. Palpations: Abdomen is soft. There is no hepatomegaly or mass. Tenderness: There is no abdominal tenderness. There is no guarding. Hernia: No hernia is present.       Comments: No peripheral signs of

## 2020-06-22 ENCOUNTER — HOSPITAL ENCOUNTER (OUTPATIENT)
Dept: PREADMISSION TESTING | Age: 40
Discharge: HOME | End: 2020-06-26
Payer: COMMERCIAL

## 2020-06-22 PROCEDURE — U0004 COV-19 TEST NON-CDC HGH THRU: HCPCS

## 2020-06-23 LAB
SARS-COV-2, PCR: NOT DETECTED
SARS-COV-2, RAPID: NORMAL
SARS-COV-2: NORMAL
SOURCE: NORMAL

## 2020-06-24 ENCOUNTER — HOSPITAL ENCOUNTER (OUTPATIENT)
Dept: ULTRASOUND IMAGING | Age: 40
Discharge: HOME | End: 2020-06-26
Payer: COMMERCIAL

## 2020-06-24 ENCOUNTER — TELEPHONE (OUTPATIENT)
Dept: PRIMARY CARE CLINIC | Age: 40
End: 2020-06-24

## 2020-06-24 PROCEDURE — 76705 ECHO EXAM OF ABDOMEN: CPT

## 2020-06-25 ENCOUNTER — INITIAL CONSULT (OUTPATIENT)
Dept: BARIATRICS/WEIGHT MGMT | Age: 40
End: 2020-06-25
Payer: COMMERCIAL

## 2020-06-25 VITALS
BODY MASS INDEX: 30.48 KG/M2 | TEMPERATURE: 97.3 F | WEIGHT: 225 LBS | DIASTOLIC BLOOD PRESSURE: 74 MMHG | SYSTOLIC BLOOD PRESSURE: 118 MMHG | HEIGHT: 72 IN | HEART RATE: 74 BPM | RESPIRATION RATE: 20 BRPM

## 2020-06-25 PROCEDURE — 99204 OFFICE O/P NEW MOD 45 MIN: CPT | Performed by: SURGERY

## 2020-06-26 ENCOUNTER — HOSPITAL ENCOUNTER (OUTPATIENT)
Age: 40
Setting detail: OUTPATIENT SURGERY
Discharge: HOME | End: 2020-06-26
Attending: INTERNAL MEDICINE | Admitting: INTERNAL MEDICINE
Payer: COMMERCIAL

## 2020-06-26 ENCOUNTER — ANESTHESIA EVENT (OUTPATIENT)
Dept: ENDOSCOPY | Age: 40
End: 2020-06-26
Payer: COMMERCIAL

## 2020-06-26 ENCOUNTER — ANESTHESIA (OUTPATIENT)
Dept: ENDOSCOPY | Age: 40
End: 2020-06-26
Payer: COMMERCIAL

## 2020-06-26 VITALS — SYSTOLIC BLOOD PRESSURE: 144 MMHG | OXYGEN SATURATION: 100 % | DIASTOLIC BLOOD PRESSURE: 92 MMHG

## 2020-06-26 VITALS
WEIGHT: 225 LBS | DIASTOLIC BLOOD PRESSURE: 78 MMHG | SYSTOLIC BLOOD PRESSURE: 113 MMHG | HEIGHT: 72 IN | HEART RATE: 73 BPM | BODY MASS INDEX: 30.48 KG/M2 | OXYGEN SATURATION: 97 % | TEMPERATURE: 97.7 F | RESPIRATION RATE: 18 BRPM

## 2020-06-26 PROCEDURE — 3700000001 HC ADD 15 MINUTES (ANESTHESIA): Performed by: INTERNAL MEDICINE

## 2020-06-26 PROCEDURE — 3609012400 HC EGD TRANSORAL BIOPSY SINGLE/MULTIPLE: Performed by: INTERNAL MEDICINE

## 2020-06-26 PROCEDURE — 7100000001 HC PACU RECOVERY - ADDTL 15 MIN: Performed by: INTERNAL MEDICINE

## 2020-06-26 PROCEDURE — 2580000003 HC RX 258: Performed by: ANESTHESIOLOGY

## 2020-06-26 PROCEDURE — 3700000000 HC ANESTHESIA ATTENDED CARE: Performed by: INTERNAL MEDICINE

## 2020-06-26 PROCEDURE — 43239 EGD BIOPSY SINGLE/MULTIPLE: CPT | Performed by: INTERNAL MEDICINE

## 2020-06-26 PROCEDURE — 6360000002 HC RX W HCPCS: Performed by: NURSE ANESTHETIST, CERTIFIED REGISTERED

## 2020-06-26 PROCEDURE — 88305 TISSUE EXAM BY PATHOLOGIST: CPT

## 2020-06-26 PROCEDURE — 2709999900 HC NON-CHARGEABLE SUPPLY: Performed by: INTERNAL MEDICINE

## 2020-06-26 PROCEDURE — 7100000000 HC PACU RECOVERY - FIRST 15 MIN: Performed by: INTERNAL MEDICINE

## 2020-06-26 PROCEDURE — 2500000003 HC RX 250 WO HCPCS: Performed by: NURSE ANESTHETIST, CERTIFIED REGISTERED

## 2020-06-26 RX ORDER — LABETALOL 20 MG/4 ML (5 MG/ML) INTRAVENOUS SYRINGE
5 EVERY 10 MIN PRN
Status: DISCONTINUED | OUTPATIENT
Start: 2020-06-26 | End: 2020-06-26 | Stop reason: HOSPADM

## 2020-06-26 RX ORDER — LIDOCAINE HYDROCHLORIDE 10 MG/ML
INJECTION, SOLUTION EPIDURAL; INFILTRATION; INTRACAUDAL; PERINEURAL PRN
Status: DISCONTINUED | OUTPATIENT
Start: 2020-06-26 | End: 2020-06-26 | Stop reason: SDUPTHER

## 2020-06-26 RX ORDER — SODIUM CHLORIDE, SODIUM LACTATE, POTASSIUM CHLORIDE, CALCIUM CHLORIDE 600; 310; 30; 20 MG/100ML; MG/100ML; MG/100ML; MG/100ML
INJECTION, SOLUTION INTRAVENOUS CONTINUOUS
Status: DISCONTINUED | OUTPATIENT
Start: 2020-06-26 | End: 2020-06-26 | Stop reason: HOSPADM

## 2020-06-26 RX ORDER — MORPHINE SULFATE 2 MG/ML
2 INJECTION, SOLUTION INTRAMUSCULAR; INTRAVENOUS EVERY 5 MIN PRN
Status: DISCONTINUED | OUTPATIENT
Start: 2020-06-26 | End: 2020-06-26 | Stop reason: HOSPADM

## 2020-06-26 RX ORDER — PROPOFOL 10 MG/ML
INJECTION, EMULSION INTRAVENOUS PRN
Status: DISCONTINUED | OUTPATIENT
Start: 2020-06-26 | End: 2020-06-26 | Stop reason: SDUPTHER

## 2020-06-26 RX ORDER — ONDANSETRON 2 MG/ML
4 INJECTION INTRAMUSCULAR; INTRAVENOUS
Status: DISCONTINUED | OUTPATIENT
Start: 2020-06-26 | End: 2020-06-26 | Stop reason: HOSPADM

## 2020-06-26 RX ORDER — MEPERIDINE HYDROCHLORIDE 25 MG/ML
12.5 INJECTION INTRAMUSCULAR; INTRAVENOUS; SUBCUTANEOUS EVERY 5 MIN PRN
Status: DISCONTINUED | OUTPATIENT
Start: 2020-06-26 | End: 2020-06-26 | Stop reason: HOSPADM

## 2020-06-26 RX ORDER — DIPHENHYDRAMINE HYDROCHLORIDE 50 MG/ML
12.5 INJECTION INTRAMUSCULAR; INTRAVENOUS
Status: DISCONTINUED | OUTPATIENT
Start: 2020-06-26 | End: 2020-06-26 | Stop reason: HOSPADM

## 2020-06-26 RX ADMIN — PROPOFOL 100 MG: 10 INJECTION, EMULSION INTRAVENOUS at 09:31

## 2020-06-26 RX ADMIN — SODIUM CHLORIDE, POTASSIUM CHLORIDE, SODIUM LACTATE AND CALCIUM CHLORIDE: 600; 310; 30; 20 INJECTION, SOLUTION INTRAVENOUS at 08:08

## 2020-06-26 RX ADMIN — LIDOCAINE HYDROCHLORIDE 50 MG: 10 INJECTION, SOLUTION EPIDURAL; INFILTRATION; INTRACAUDAL; PERINEURAL at 09:31

## 2020-06-26 ASSESSMENT — PULMONARY FUNCTION TESTS
PIF_VALUE: 0
PIF_VALUE: 1
PIF_VALUE: 0
PIF_VALUE: 1
PIF_VALUE: 0
PIF_VALUE: 1
PIF_VALUE: 0
PIF_VALUE: 1
PIF_VALUE: 0

## 2020-06-26 ASSESSMENT — ENCOUNTER SYMPTOMS
SHORTNESS OF BREATH: 0
STRIDOR: 0

## 2020-06-26 ASSESSMENT — PAIN DESCRIPTION - DESCRIPTORS
DESCRIPTORS: CRAMPING
DESCRIPTORS: BURNING

## 2020-06-26 ASSESSMENT — PAIN DESCRIPTION - PAIN TYPE: TYPE: ACUTE PAIN

## 2020-06-26 ASSESSMENT — PAIN - FUNCTIONAL ASSESSMENT: PAIN_FUNCTIONAL_ASSESSMENT: 0-10

## 2020-06-26 ASSESSMENT — LIFESTYLE VARIABLES: SMOKING_STATUS: 0

## 2020-06-26 ASSESSMENT — PAIN DESCRIPTION - LOCATION: LOCATION: ABDOMEN

## 2020-06-26 ASSESSMENT — PAIN SCALES - GENERAL: PAINLEVEL_OUTOF10: 0

## 2020-06-26 NOTE — ANESTHESIA PRE PROCEDURE
(102.1 kg)   06/19/20 223 lb (101.2 kg)   05/21/20 231 lb 6.4 oz (105 kg)     There is no height or weight on file to calculate BMI.    CBC:   Lab Results   Component Value Date    WBC 8.8 05/22/2020    RBC 4.69 05/22/2020    RBC 5.06 12/22/2011    HGB 14.2 05/22/2020    HCT 42.4 05/22/2020    MCV 90 05/22/2020    RDW 13.6 08/27/2013     05/22/2020     12/22/2011     LR    CMP:   Lab Results   Component Value Date     05/22/2020    K 3.6 05/22/2020     05/22/2020    CO2 30 05/22/2020    BUN 17 05/22/2020    CREATININE 0.90 05/22/2020    GFRAA >60 04/18/2019    LABGLOM >60 05/22/2020    LABGLOM >60 04/18/2019    GLUCOSE 92 05/22/2020    GLUCOSE 108 12/22/2011    PROT 7.5 04/18/2019    CALCIUM 9.6 05/22/2020    BILITOT 0.5 05/22/2020    ALKPHOS 80 05/22/2020    AST 19 05/22/2020    ALT 21 05/22/2020       POC Tests: No results for input(s): POCGLU, POCNA, POCK, POCCL, POCBUN, POCHEMO, POCHCT in the last 72 hours. Coags: No results found for: PROTIME, INR, APTT    HCG (If Applicable): No results found for: PREGTESTUR, PREGSERUM, HCG, HCGQUANT     ABGs: No results found for: PHART, PO2ART, LSY9OLN, BZS5BMI, BEART, L3RDQTPK     Type & Screen (If Applicable):  No results found for: LABABO, LABRH    Drug/Infectious Status (If Applicable):  No results found for: HIV, HEPCAB    COVID-19 Screening (If Applicable):   Lab Results   Component Value Date    COVID19 Not Detected 06/22/2020         Anesthesia Evaluation  Patient summary reviewed no history of anesthetic complications:   Airway: Mallampati: II  TM distance: >3 FB   Neck ROM: full  Mouth opening: > = 3 FB Dental: normal exam         Pulmonary:Negative Pulmonary ROS and normal exam  breath sounds clear to auscultation      (-) pneumonia, COPD, asthma, shortness of breath, recent URI, sleep apnea, rhonchi, wheezes, rales, stridor and not a current smoker          Patient did not smoke on day of surgery. Cardiovascular:  Exercise tolerance: good (>4 METS),   (+) hypertension: no interval change,     (-) pacemaker, valvular problems/murmurs, past MI, CAD, CABG/stent, dysrhythmias,  angina,  CHF, orthopnea, PND,  GRAMAJO, murmur, weak pulses,  friction rub, systolic click, carotid bruit,  JVD and peripheral edema      Rhythm: regular  Rate: normal           Beta Blocker:  Not on Beta Blocker         Neuro/Psych:   Negative Neuro/Psych ROS     (-) seizures, neuromuscular disease, TIA, CVA, headaches, psychiatric history and depression/anxiety            GI/Hepatic/Renal: Neg GI/Hepatic/Renal ROS       (-) hiatal hernia, GERD, PUD, hepatitis, liver disease, no renal disease, bowel prep and no morbid obesity       Endo/Other: Negative Endo/Other ROS   (+) no malignancy/cancer. (-) diabetes mellitus, hypothyroidism, hyperthyroidism, blood dyscrasia, arthritis, no electrolyte abnormalities, no malignancy/cancer               Abdominal:           Vascular: negative vascular ROS. - PVD, DVT and PE. Anesthesia Plan      MAC and general     ASA 2       Induction: intravenous. MIPS: Postoperative opioids intended and Prophylactic antiemetics administered. Anesthetic plan and risks discussed with patient. Plan discussed with CRNA. The patient was counseled at length about the risks of jackeline Covid-19 during their perioperative period and any recovery window from their procedure. The patient was made aware that jackeline Covid-19  may worsen their prognosis for recovering from their procedure  and lend to a higher morbidity and/or mortality risk. All material risks, benefits, and reasonable alternatives including postponing the procedure were discussed. The patient does wish to proceed with the procedure at this time.           Myrna Lopez MD   6/26/2020

## 2020-06-29 LAB — SURGICAL PATHOLOGY REPORT: NORMAL

## 2020-06-30 ENCOUNTER — HOSPITAL ENCOUNTER (OUTPATIENT)
Dept: CT IMAGING | Age: 40
Discharge: HOME | End: 2020-07-02
Payer: COMMERCIAL

## 2020-06-30 LAB
CREAT SERPL-MCNC: 0.91 MG/DL (ref 0.7–1.2)
GFR AFRICAN AMERICAN: >60 ML/MIN
GFR NON-AFRICAN AMERICAN: >60 ML/MIN
GFR SERPL CREATININE-BSD FRML MDRD: NORMAL ML/MIN/{1.73_M2}
GFR SERPL CREATININE-BSD FRML MDRD: NORMAL ML/MIN/{1.73_M2}

## 2020-06-30 PROCEDURE — 36415 COLL VENOUS BLD VENIPUNCTURE: CPT

## 2020-06-30 PROCEDURE — 6360000004 HC RX CONTRAST MEDICATION: Performed by: SURGERY

## 2020-06-30 PROCEDURE — 2580000003 HC RX 258: Performed by: SURGERY

## 2020-06-30 PROCEDURE — 74177 CT ABD & PELVIS W/CONTRAST: CPT

## 2020-06-30 PROCEDURE — 82565 ASSAY OF CREATININE: CPT

## 2020-06-30 RX ORDER — 0.9 % SODIUM CHLORIDE 0.9 %
80 INTRAVENOUS SOLUTION INTRAVENOUS ONCE
Status: COMPLETED | OUTPATIENT
Start: 2020-06-30 | End: 2020-06-30

## 2020-06-30 RX ORDER — SODIUM CHLORIDE 0.9 % (FLUSH) 0.9 %
10 SYRINGE (ML) INJECTION ONCE
Status: COMPLETED | OUTPATIENT
Start: 2020-06-30 | End: 2020-06-30

## 2020-06-30 RX ADMIN — Medication 10 ML: at 17:12

## 2020-06-30 RX ADMIN — IOHEXOL 50 ML: 240 INJECTION, SOLUTION INTRATHECAL; INTRAVASCULAR; INTRAVENOUS; ORAL at 17:13

## 2020-06-30 RX ADMIN — SODIUM CHLORIDE 80 ML: 9 INJECTION, SOLUTION INTRAVENOUS at 17:11

## 2020-06-30 RX ADMIN — IOVERSOL 75 ML: 741 INJECTION INTRA-ARTERIAL; INTRAVENOUS at 17:12

## 2020-07-01 ENCOUNTER — OFFICE VISIT (OUTPATIENT)
Dept: BARIATRICS/WEIGHT MGMT | Age: 40
End: 2020-07-01
Payer: COMMERCIAL

## 2020-07-01 VITALS
SYSTOLIC BLOOD PRESSURE: 110 MMHG | DIASTOLIC BLOOD PRESSURE: 72 MMHG | TEMPERATURE: 98 F | HEIGHT: 72 IN | RESPIRATION RATE: 18 BRPM | BODY MASS INDEX: 30.48 KG/M2 | WEIGHT: 225 LBS | HEART RATE: 76 BPM

## 2020-07-01 PROCEDURE — 99213 OFFICE O/P EST LOW 20 MIN: CPT | Performed by: SURGERY

## 2020-07-05 NOTE — PROGRESS NOTES
[facility]  [facility]  [address] [place] [address] [id number]  Dept: [phone]    ROBOTIC & MINIMALLY INVASIVE SURGERY  PROGRESS NOTE INITIAL EVALUATION     Patient: Vern Bello        Service Date: 6/25/2020     HPI:     Chief Complaint   Patient presents with    Surgical Consult     gen surg GERD/reflux     Gastroesophageal Reflux       The patient is a pleasant 38y.o. year old male with epigastric pain, who stands Height: 6' 0.01\" (182.9 cm) tall with a weight of Weight: 225 lb (102.1 kg) , resulting in a BMI of Body mass index is 30.51 kg/m². He states he has been having symptoms for the past few months. He complains of left upper quadrant pain and nausea. He does not associate this with meals. He does not believe he is having reflux. He denies diarrhea, constipation, melena or hematochezia. He does not have history of prior endoscopy or peptic ulcer. He had a gallbladder ultrasound showing a polyp. The patient denies  a history of myocardial infarction, deep vein thrombosis, pulmonary embolism, renal failure, hepatic failure and stroke.          Medical History:  Past Medical History:   Diagnosis Date    Hypertension        Surgical History:  Past Surgical History:   Procedure Laterality Date    UPPER GASTROINTESTINAL ENDOSCOPY N/A 6/26/2020    EGD BIOPSY performed by Delma Deng MD at [facility]       Family History:      Problem Relation Age of Onset    Cancer Mother     High Blood Pressure Father        Social History:   Social History     Tobacco Use    Smoking status: Never Smoker    Smokeless tobacco: Never Used   Substance Use Topics    Alcohol use: No    Drug use: No       Current Med List:  Current Outpatient Medications   Medication Sig Dispense Refill    pantoprazole (PROTONIX) 40 MG tablet Take 1 tablet by mouth every morning (before breakfast) 30 tablet 0    losartan-hydrochlorothiazide (HYZAAR) 100-25 MG per tablet

## 2020-07-08 NOTE — PROGRESS NOTES
[facility]  [facility]  [address] [place] [address] [address] [phone]  Dept: [phone]    ROBOTIC & MINIMALLY INVASIVE SURGERY  PROGRESS NOTE EVALUATION     Patient: Li Ege        Service Date: 7/1/2020     HPI:     Chief Complaint   Patient presents with    Follow-up     1wk f/u GERD/Reflux       The patient is a pleasant 38y.o. year old male with epigastric pain, who stands Height: 6' (182.9 cm) tall with a weight of Weight: 225 lb (102.1 kg) , resulting in a BMI of Body mass index is 30.52 kg/m². He states he has been having symptoms for the past few months. He complains of left upper quadrant pain and nausea. He does not associate this with meals. He does not believe he is having reflux. He denies diarrhea, constipation, melena or hematochezia. He does not have history of prior endoscopy or peptic ulcer. He had a gallbladder ultrasound showing a polyp. He completed EGD which was negative. He feels his pain is improved. He is aware of the gallbladder polyp and wants to follow up in the future.     Medical History:  Past Medical History:   Diagnosis Date    Hypertension        Surgical History:  Past Surgical History:   Procedure Laterality Date    UPPER GASTROINTESTINAL ENDOSCOPY N/A 6/26/2020    EGD BIOPSY performed by Leisa Ordoñez MD at [address] [place] [address]       Family History:      Problem Relation Age of Onset    Cancer Mother     High Blood Pressure Father        Social History:   Social History     Tobacco Use    Smoking status: Never Smoker    Smokeless tobacco: Never Used   Substance Use Topics    Alcohol use: No    Drug use: No       Current Med List:  Current Outpatient Medications   Medication Sig Dispense Refill    pantoprazole (PROTONIX) 40 MG tablet Take 1 tablet by mouth every morning (before breakfast) 30 tablet 0    losartan-hydrochlorothiazide (HYZAAR) 100-25 MG per tablet Take 1 tablet by mouth daily 90 tablet 3     No current facility-administered medications for this visit. Allergies   Allergen Reactions    Ace Inhibitors      cough       SOCIAL:      This patient is alone for the evaluation today.     REVIEW OF SYSTEMS: (Negative unless marked otherwise)     General  Negative for: [] Weight Change   [x] Fatigue   [x] Fevers & Chills    [] Appetite change [] Other:    Positive for: [] Weight Change   [] Fatigue   [] Fevers & Chills    [] Appetite change [] Other:   Cardiac  Negative for: [x] Chest Pain   [] Difficulty Breathing   [] Leg Cramps [x] Edema of Lower Extremeties    [] Left   [] Right      Positive for: [] Chest Pain   [] Difficulty Breathing   [] Leg Cramps [] Edema of Lower Extremeties    [] Left   [] Right   Pulmonary  Negative for: [x] Shortness of Breath [] Wheeze [x] Cough  [] Calf Pain     Positive for: [] Shortness of Breath [] Wheeze [] Cough  [] Calf Pain   Gastro-Intestinal Negative for: [] Heartburn   [] Reflux   [] Dysphagia   [] Melena   [] BRBPR  [x] Vomiting   [x] Abdominal Pain   [] Diarrhea  [] Hernia    [] Constipation  [] Other:     Positive for: [] Heartburn   [] Reflux   [] Dysphagia   [] Melena   [] BRBPR  [] Vomiting   [] Abdominal Pain   [] Diarrhea  [] Hernia    [] Constipation  [] Other:    Muskuloskeletal Negative for: [x] Joint pain   [] Back pain   [] Knee Pain   [] Muscle weakness   [x] Edema [] Other:     Positive for: [] Joint pain   [] Back pain   [] Knee Pain   [] Muscle weakness  [] Edema [] Other:    Neurologic Negative for: [x] Syncope   [] Insomnia   [x] Being treated for depression  [] Other:     Positive for: [] Syncope   [] Insomnia   [] Being treated for depression  [] Other:    Skin Negative for: [x] Wound:   [] Open   [] Draining   [] Incisional     [x] Rash   [] Hair Loss  [] Other:     Positive for: [] Wound:   [] Open   [] Draining    [] Incisional  [] Rash   [] Hair Loss  [] Other:      PHYSICAL EXAMINATION:      /72 (Site: Right Upper Arm, Position: Sitting, Cuff

## 2020-07-09 ENCOUNTER — TELEPHONE (OUTPATIENT)
Dept: BARIATRICS/WEIGHT MGMT | Age: 40
End: 2020-07-09

## 2020-07-13 ENCOUNTER — TELEMEDICINE (OUTPATIENT)
Dept: FAMILY MEDICINE CLINIC | Age: 40
End: 2020-07-13
Payer: COMMERCIAL

## 2020-07-13 PROCEDURE — 99443 PR PHYS/QHP TELEPHONE EVALUATION 21-30 MIN: CPT | Performed by: FAMILY MEDICINE

## 2020-07-14 ENCOUNTER — HOSPITAL ENCOUNTER (OUTPATIENT)
Dept: NUCLEAR MEDICINE | Age: 40
Discharge: HOME | End: 2020-07-16
Payer: COMMERCIAL

## 2020-07-14 VITALS — BODY MASS INDEX: 29.12 KG/M2 | HEIGHT: 72 IN | WEIGHT: 215 LBS

## 2020-07-14 PROCEDURE — A9537 TC99M MEBROFENIN: HCPCS | Performed by: SURGERY

## 2020-07-14 PROCEDURE — 78227 HEPATOBIL SYST IMAGE W/DRUG: CPT

## 2020-07-14 PROCEDURE — 3430000000 HC RX DIAGNOSTIC RADIOPHARMACEUTICAL: Performed by: SURGERY

## 2020-07-14 PROCEDURE — 2580000003 HC RX 258: Performed by: SURGERY

## 2020-07-14 RX ORDER — SODIUM CHLORIDE 0.9 % (FLUSH) 0.9 %
10 SYRINGE (ML) INJECTION ONCE
Status: COMPLETED | OUTPATIENT
Start: 2020-07-14 | End: 2020-07-14

## 2020-07-14 RX ADMIN — Medication 10 ML: at 08:08

## 2020-07-14 RX ADMIN — Medication 3.2 MILLICURIE: at 08:11

## 2020-07-16 ENCOUNTER — TELEPHONE (OUTPATIENT)
Dept: GASTROENTEROLOGY | Age: 40
End: 2020-07-16

## 2020-07-16 NOTE — TELEPHONE ENCOUNTER
patient called he put his back out and has two appointments tomorrow 1 for his back and he states he needs to see us too so I moved his appointment down to 10:45

## 2020-07-17 ENCOUNTER — TELEPHONE (OUTPATIENT)
Dept: FAMILY MEDICINE CLINIC | Age: 40
End: 2020-07-17

## 2020-07-17 ENCOUNTER — OFFICE VISIT (OUTPATIENT)
Dept: GASTROENTEROLOGY | Age: 40
End: 2020-07-17
Payer: COMMERCIAL

## 2020-07-17 VITALS — BODY MASS INDEX: 30.2 KG/M2 | TEMPERATURE: 97.3 F | RESPIRATION RATE: 18 BRPM | WEIGHT: 223 LBS | HEIGHT: 72 IN

## 2020-07-17 PROBLEM — R10.11 ABDOMINAL PAIN, RIGHT UPPER QUADRANT: Status: ACTIVE | Noted: 2020-07-17

## 2020-07-17 PROCEDURE — 99213 OFFICE O/P EST LOW 20 MIN: CPT | Performed by: NURSE PRACTITIONER

## 2020-07-17 RX ORDER — LOSARTAN POTASSIUM AND HYDROCHLOROTHIAZIDE 25; 100 MG/1; MG/1
1 TABLET ORAL DAILY
Qty: 90 TABLET | Refills: 0 | Status: SHIPPED | OUTPATIENT
Start: 2020-07-17 | End: 2021-05-10 | Stop reason: SDUPTHER

## 2020-07-17 RX ORDER — FAMOTIDINE 20 MG/1
20 TABLET, FILM COATED ORAL DAILY PRN
Qty: 60 TABLET | Refills: 3 | Status: SHIPPED | OUTPATIENT
Start: 2020-07-17 | End: 2020-08-11 | Stop reason: SDUPTHER

## 2020-07-17 ASSESSMENT — ENCOUNTER SYMPTOMS
WHEEZING: 0
CONSTIPATION: 0
TROUBLE SWALLOWING: 0
ANAL BLEEDING: 0
COUGH: 1
SORE THROAT: 1
DIARRHEA: 0
VOMITING: 0
ABDOMINAL DISTENTION: 0
BLOOD IN STOOL: 0
ABDOMINAL PAIN: 1
CHOKING: 0
NAUSEA: 1
RECTAL PAIN: 0

## 2020-07-17 NOTE — TELEPHONE ENCOUNTER
Next Visit Date:  Future Appointments   Date Time Provider Breana Blair   7/17/2020 10:45 AM Robel Tinsley APRN - NP [facility]   10/1/2020  8:30 AM [facility] [place] [facility]   12/30/2020  8:30 AM Maria Ines Cuff, DO bariatric neff [address] Maintenance   Topic Date Due    Flu vaccine (1) 09/01/2020    Potassium monitoring  05/22/2021    Creatinine monitoring  06/30/2021    Lipid screen  05/22/2025    DTaP/Tdap/Td vaccine (2 - Td) 04/18/2029    HIV screen  Completed    Hepatitis A vaccine  Aged Out    Hepatitis B vaccine  Aged Out    Hib vaccine  Aged Out    Meningococcal (ACWY) vaccine  Aged Out    Pneumococcal 0-64 years Vaccine  Aged Out       No results found for: LABA1C          ( goal A1C is < 7)   No results found for: LABMICR  LDL Cholesterol (mg/dL)   Date Value   04/18/2019 129   08/27/2013 104 (H)     LDL Calculated (mg/dL)   Date Value   05/22/2020 149       (goal LDL is <100)   AST (U/L)   Date Value   05/22/2020 19     ALT (U/L)   Date Value   05/22/2020 21     BUN (mg/dL)   Date Value   05/22/2020 17     BP Readings from Last 3 Encounters:   07/01/20 110/72   06/26/20 113/78   06/26/20 (!) 144/92          (goal 120/80)    All Future Testing planned in [facility]  Lab Frequency Next Occurrence   EGD Once 09/19/2020   US Gallbladder Ruq Once 08/16/2020               Patient Active Problem List:     Essential hypertension

## 2020-07-17 NOTE — TELEPHONE ENCOUNTER
Please call him back - advise he should use the acyclovir as noted and if he wants to be seen for a follow up to look at the c rash, we will have him come in to the [address] [address] office [facility]) office on Monday.     Thank you   Dr Babak Seals

## 2020-07-17 NOTE — PROGRESS NOTES
Subjective:      Patient ID: Xu Barajas is a 38 y.o. male. Chief Complaint   Patient presents with    Gastroesophageal Reflux     Patient is here today for f/u EGD and HIDA scan. He states he is still having the same symptoms as before. He c/o heartburn, stabbing abd pain, and pressure. He states Protonix is not working. HPI    Dr. Alice Leavitt, APRN - CNP our mutual patient Xu Barajas was seen  for   1. Gastroesophageal reflux disease, esophagitis presence not specified    2. Abdominal pain, right upper quadrant     . Patient being seen for follow-up EGD, US RUQ. Patient had EGD and this was unremarkable. Biopsies obtained from esophagus revealed squamous epithelium with reactive changes. Biopsies from stomach negative for H Pylori; minimal chronic inactive gastritis. He had RUQ ultrasound and noted to have small polyp seen within the gallbladder    At present reports improvement of his sore throat and heartburns with PPI therapy. Intermittently he gets severe heartburns. Typically at night. He still reports epigastric pain. No relation to meals. Pain described as stabbing pains at times, other times it's a feeling of pressure. He had EKG and this was normal.  He has cardiology consult next week. Denies nausea, vomiting. No weight loss, fevers, chills. Has good appetite. Bowel movements satisfactory. No melena, hematochezia. Appears to have significant anxiety. Past Medical, Family, and Social History reviewed and does contribute to the patient presenting condition. patient\"s PMH/PSH,SH,PSYCH hx, MEDs, ALLERGIES, and ROS was all reviewed and updated ion the appropriate sections    Review of Systems   Constitutional: Positive for appetite change (decrease). Negative for fatigue and unexpected weight change. HENT: Positive for sore throat (better). Negative for trouble swallowing. Respiratory: Positive for cough. Negative for choking and wheezing. Cardiovascular: Positive for chest pain and palpitations. Negative for leg swelling. Gastrointestinal: Positive for abdominal pain and nausea. Negative for abdominal distention, anal bleeding, blood in stool, constipation, diarrhea, rectal pain and vomiting. Heartburn, acid in mouth   Genitourinary: Negative for difficulty urinating. Allergic/Immunologic: Negative for environmental allergies and food allergies. Neurological: Negative for dizziness, weakness, light-headedness, numbness and headaches. Hematological: Does not bruise/bleed easily. Psychiatric/Behavioral: Negative for sleep disturbance. The patient is not nervous/anxious. Objective:   Physical Exam  Vitals signs and nursing note reviewed. Constitutional:       General: He is not in acute distress. Appearance: He is well-developed and overweight. HENT:      Head: Normocephalic and atraumatic. Mouth/Throat:      Pharynx: No oropharyngeal exudate. Eyes:      General: No scleral icterus. Conjunctiva/sclera: Conjunctivae normal.      Pupils: Pupils are equal, round, and reactive to light. Neck:      Musculoskeletal: Normal range of motion and neck supple. Thyroid: No thyromegaly. Trachea: No tracheal deviation. Cardiovascular:      Rate and Rhythm: Normal rate and regular rhythm. Heart sounds: Normal heart sounds. No murmur. Pulmonary:      Effort: Pulmonary effort is normal. No respiratory distress. Breath sounds: Normal breath sounds. No wheezing or rales. Abdominal:      General: Bowel sounds are normal. There is no distension. Palpations: Abdomen is soft. There is no hepatomegaly or mass. Tenderness: There is no abdominal tenderness. There is no guarding or rebound. Hernia: No hernia is present. Genitourinary:     Rectum: Normal.   Lymphadenopathy:      Cervical: No cervical adenopathy. Skin:     General: Skin is warm and dry. Findings: No erythema or rash. Neurological:      Mental Status: He is alert and oriented to person, place, and time. Cranial Nerves: No cranial nerve deficit. Psychiatric:         Thought Content: Thought content normal.         Assessment:       Diagnosis Orders   1. Gastroesophageal reflux disease, esophagitis presence not specified  famotidine (PEPCID) 20 MG tablet    esomeprazole (NEXIUM) 20 MG delayed release capsule   2. Abdominal pain, right upper quadrant             Plan: At present patient is stable. His EGD and RUQ US reviewed. To continue PPI and antireflux measures. Advised Pepcid as needed in the evening. To follow through with cardiology consult to rule out cardiac pathology. Discussed patient's anxiety and how this may be contributing to his symptoms. He would like to discuss this with his PCP.   Follow-up 2 months

## 2020-07-17 NOTE — LETTER
KEENA Dailey - CNP  [facility]  [address] [place] [id number]  Dept: [phone]    7/20/2020    [address] [facility].      Dear Souleymane Gray    In addition to helping you feel better when you are sick, we are interested in preventing illness and injury in the first place. In the spirit of maintaining your good health, your record indicates that you are due for an appointment. Please call and make a appointment.     Thank you       Please call [phone] to schedule     I look forward to seeing you soon     Sincerely,       Jordy Beatty CNP

## 2020-07-20 NOTE — TELEPHONE ENCOUNTER
Please return a call to the patient - offer an appointment with first available PGY Resident.     Thao Salas, DO

## 2020-07-22 NOTE — TELEPHONE ENCOUNTER
Called patient and gave me the message , he states he will call [facility] office to see if another physician will see him there as it is closer to home.

## 2020-07-24 ENCOUNTER — OFFICE VISIT (OUTPATIENT)
Dept: FAMILY MEDICINE CLINIC | Age: 40
End: 2020-07-24
Payer: COMMERCIAL

## 2020-07-24 VITALS
HEART RATE: 80 BPM | TEMPERATURE: 97.8 F | DIASTOLIC BLOOD PRESSURE: 88 MMHG | WEIGHT: 221 LBS | OXYGEN SATURATION: 98 % | BODY MASS INDEX: 29.97 KG/M2 | SYSTOLIC BLOOD PRESSURE: 132 MMHG

## 2020-07-24 PROCEDURE — 99213 OFFICE O/P EST LOW 20 MIN: CPT | Performed by: NURSE PRACTITIONER

## 2020-07-24 RX ORDER — GABAPENTIN 100 MG/1
100 CAPSULE ORAL 3 TIMES DAILY
Qty: 30 CAPSULE | Refills: 0 | Status: SHIPPED | OUTPATIENT
Start: 2020-07-24 | End: 2020-07-28 | Stop reason: ALTCHOICE

## 2020-07-24 RX ORDER — PREGABALIN 50 MG/1
50 CAPSULE ORAL 3 TIMES DAILY PRN
COMMUNITY
End: 2020-07-28 | Stop reason: ALTCHOICE

## 2020-07-24 ASSESSMENT — ENCOUNTER SYMPTOMS
COUGH: 0
VOMITING: 0
ABDOMINAL PAIN: 0
BACK PAIN: 0
CONSTIPATION: 0
DIARRHEA: 0
SHORTNESS OF BREATH: 0
SORE THROAT: 0
CHEST TIGHTNESS: 0
RHINORRHEA: 0
ABDOMINAL DISTENTION: 0
NAUSEA: 0

## 2020-07-24 NOTE — PROGRESS NOTES
Jorge Samuel, APRN-CNP  [address] [facility]  [id number]  [place] [address]  [address] [address]  Dept: [phone]  Dept Fax: [phone]     Patient ID: Xu Barajas is a 38 y.o. male. HPI    Pt here today for an acute visit secondary to uncontrolled pain. Approx. 2 weeks ago, he developed a burning snsation on his right hip. A couple of days later, it moved down his left thigh and then progressed to his back. He did notice a couple of \"blisters\" but thought it may have been from his boxer briefs. He then went to an orthopedist for his back pain. He was then diagnosed him with shingles. He did complete a 7 day course of Acyclovir but continues to complain of unrelieved pain. He was given 50mg of Lyrica with minimal relief. Pt denies any fever or chills. Pt today denies any HA, chest pain, or SOB. Pt denies any N/V/D/C or abdominal pain. Otherwise pt doing well on current tx and no other concerns today. The patient's past medical, surgical, social, and family history as well as his current medications and allergies were reviewed as documented in today's encounter by RICHARD Woo. Current Outpatient Medications on File Prior to Visit   Medication Sig Dispense Refill    losartan-hydrochlorothiazide (HYZAAR) 100-25 MG per tablet Take 1 tablet by mouth daily 90 tablet 0    famotidine (PEPCID) 20 MG tablet Take 1 tablet by mouth daily as needed (Breakthrough heartburns) 60 tablet 3    esomeprazole (NEXIUM) 20 MG delayed release capsule Take 1 capsule by mouth daily 30 capsule 3    pregabalin (LYRICA) 50 MG capsule Take 50 mg by mouth 3 times daily as needed.  pantoprazole (PROTONIX) 40 MG tablet Take 1 tablet by mouth every morning (before breakfast) (Patient not taking: Reported on 7/24/2020) 30 tablet 0     No current facility-administered medications on file prior to visit.          Subjective:     Review of Systems distension. Palpations: Abdomen is soft. Musculoskeletal: Normal range of motion. Skin:     General: Skin is warm and dry. Findings: No rash. Neurological:      General: No focal deficit present. Mental Status: He is alert and oriented to person, place, and time. Deep Tendon Reflexes: Reflexes normal.   Psychiatric:         Behavior: Behavior normal.       Assessment:      Diagnosis Orders   1. Shingles (herpes zoster) polyneuropathy  gabapentin (NEURONTIN) 100 MG capsule     Plan:     1. Shingles (herpes zoster) polyneuropathy  - Stop Lyrica 50mg daily  - Will start low dose Neurontin TID.   - Call office if symptoms worsen or do not improve at any time  - gabapentin (NEURONTIN) 100 MG capsule; Take 1 capsule by mouth 3 times daily for 10 days. Dispense: 30 capsule; Refill: 0    - Rest of systems unchanged, continue current treatments. - Medications, labs, diagnostic studies, consultations and follow-up as documented in this encounter.  Rest of systems unchanged, continue current treatments    KEENA Mccracken-CNP

## 2020-07-27 ENCOUNTER — TELEPHONE (OUTPATIENT)
Dept: FAMILY MEDICINE CLINIC | Age: 40
End: 2020-07-27

## 2020-07-27 NOTE — TELEPHONE ENCOUNTER
Patient called and his symptoms are not getting better and the rash is getting worse. Changed medication and has not helped.

## 2020-07-27 NOTE — TELEPHONE ENCOUNTER
This patient is listed as our patient but the last encounter is from a different family practice I believe patient switched office please re route to right provider.

## 2020-07-28 ENCOUNTER — TELEPHONE (OUTPATIENT)
Dept: FAMILY MEDICINE CLINIC | Age: 40
End: 2020-07-28

## 2020-07-28 RX ORDER — GABAPENTIN 300 MG/1
300 CAPSULE ORAL 3 TIMES DAILY
Qty: 42 CAPSULE | Refills: 0 | Status: SHIPPED | OUTPATIENT
Start: 2020-07-28 | End: 2020-09-21

## 2020-07-28 NOTE — TELEPHONE ENCOUNTER
Patient called yesterday for Kayla Serna, and I left encounter for him. Kayla Serna asked to set appt for him, tried to contact earlier today to set appt with Kayla Serna and no answer, no VM. Patient called back and shingles symptoms are not getting better and believes he needs different/more medication. Patient didn't want to make appt,, since he had just had appt with you, thought you could just change meds. Please advise.

## 2020-07-28 NOTE — TELEPHONE ENCOUNTER
I can increase his dose to 300mg three times a day, but this is the most I can do. He will need to call Dr. Matilda Gray for further medication adjustments.

## 2020-07-28 NOTE — TELEPHONE ENCOUNTER
Pt left vm on nurse line pt only goes to [place] location spoke with pt and he wants to schedule at that location. [place] office number given to pt.

## 2020-08-03 ENCOUNTER — OFFICE VISIT (OUTPATIENT)
Dept: FAMILY MEDICINE CLINIC | Age: 40
End: 2020-08-03
Payer: COMMERCIAL

## 2020-08-03 VITALS
TEMPERATURE: 98.1 F | OXYGEN SATURATION: 97 % | WEIGHT: 226.8 LBS | HEIGHT: 72 IN | HEART RATE: 72 BPM | SYSTOLIC BLOOD PRESSURE: 122 MMHG | BODY MASS INDEX: 30.72 KG/M2 | DIASTOLIC BLOOD PRESSURE: 82 MMHG

## 2020-08-03 PROCEDURE — 99213 OFFICE O/P EST LOW 20 MIN: CPT | Performed by: FAMILY MEDICINE

## 2020-08-03 NOTE — PROGRESS NOTES
Follow-up (Patient states he is here as a follow-up from having shingles. He states he is feeling better. He had to cancel his cardiology appointment due to shingles.)    Dr Meliza Saucedo - back pain - turned out to be shingles, Has since started to resolve. Dr Kirk Paiz - right shoulder pain, since seen by ortho. Recent bout of shingles - right lower abd/pelvis anterior skin area. Last note reviewed - still gets and pain  pangular - tightness on upprt left quadrant   Last PCP  - Miguelito Laureano - trieds steroids in early April., shoulder paJN, poor sleep., tried tapedose, feels that they messed up and the BP increased and HR stayed elevated.     Patient to see dr Trisha Gutiérrez next Friday for consult (likely to do do stress)    60 lbs weight loss (intentional this past year    July 10th outbreak of shingles    Chart reviewed -     Works:   Day shift - m-f  No weekewnd    8 - 200  Up at 56 am    Hobbies - Dr Gilson Shelley friend shanna    Feeling a little better - used neurontin        /82 (Site: Left Upper Arm, Position: Sitting, Cuff Size: Medium Adult)   Pulse 72   Temp 98.1 °F (36.7 °C) (Temporal)   Ht 6' (1.829 m)   Wt 226 lb 12.8 oz (102.9 kg)   SpO2 97%   BMI 30.76 kg/m²       Review of Systems     Negative for:    Headache  Dizziness  Visual Disturbance  Hearing Changes  Nasal / sinus Symptoms  Mouth / tooth symptom, pain  Throat pain  Difficulty swallowing  Neck pain  Chest discomfort  Cough  SOB  N/V/D/C  Pelvic area discomfort  Bladder / voiding discomfort  Bowel complaints  MS complaints   Numbness/tingling/abnormal sensations   Edema / Leg swelling  Dizziness  Fatigue  Bleeding   Skin    Pertinent Pos: See HPI    Labs reviewed    Physical Exam  Alert and oriented to PPT  NAD    HEENT - neg  Neck - no bruits, no lymphadenopathy  Chest  HRRR w/o murmer  LCTAB no wheezes / rhonchi  Abdomen - soft, non-tender, BS  Extremities - 0+ PTE    Gait / Station - stable, no dysequilibrium, uniform pace, no assist device, cane. Wide area dried rash, discoloration - l3 to s1 around to pelvis    ASSESSMENT AND PLAN      Diagnosis Orders   1.  Atypical chest pain         Dr Criselda Balderas seeing patient - f/u recently meds changed to nexium    Taper neurontin as going forwad    F/U prn    Electronicallysigned by Tatiana Almodovar DO on 8/3/2020 at 6:44 PM

## 2020-08-11 RX ORDER — FAMOTIDINE 20 MG/1
20 TABLET, FILM COATED ORAL DAILY PRN
Qty: 90 TABLET | Refills: 3 | Status: ON HOLD | OUTPATIENT
Start: 2020-08-11 | End: 2021-01-26 | Stop reason: HOSPADM

## 2020-09-01 ENCOUNTER — TELEPHONE (OUTPATIENT)
Dept: GASTROENTEROLOGY | Age: 40
End: 2020-09-01

## 2020-09-01 NOTE — TELEPHONE ENCOUNTER
Pt is calling and requesting that his Nexium 20 mg get [facility]. Currently coming from [facility].  Please call pt and let him know if there is any problem with this

## 2020-09-02 NOTE — TELEPHONE ENCOUNTER
Writer spoke with patient and informed him that after his first order was sent to [facility] a second order was [facility] to [facility]. Writer [facility]ed [facility] just to be safe. Told patient to call us back if he has any problems with getting his medication. Patient thanked Eros Tom.

## 2020-09-14 ENCOUNTER — OFFICE VISIT (OUTPATIENT)
Dept: GASTROENTEROLOGY | Age: 40
End: 2020-09-14
Payer: COMMERCIAL

## 2020-09-14 VITALS — HEIGHT: 72 IN | WEIGHT: 232.8 LBS | TEMPERATURE: 97.8 F | BODY MASS INDEX: 31.53 KG/M2

## 2020-09-14 PROCEDURE — 99214 OFFICE O/P EST MOD 30 MIN: CPT | Performed by: INTERNAL MEDICINE

## 2020-09-14 ASSESSMENT — ENCOUNTER SYMPTOMS
COUGH: 0
ABDOMINAL DISTENTION: 0
TROUBLE SWALLOWING: 0
VOMITING: 0
BLOOD IN STOOL: 0
RECTAL PAIN: 0
SORE THROAT: 1
NAUSEA: 1
CONSTIPATION: 0
ANAL BLEEDING: 0
WHEEZING: 0
DIARRHEA: 0
ABDOMINAL PAIN: 1
BACK PAIN: 0
CHOKING: 0

## 2020-09-14 NOTE — PROGRESS NOTES
GI OFFICE FOLLOW UP    INTERVAL HISTORY:   No referring provider defined for this encounter. Chief Complaint   Patient presents with    Follow-up     Patient is herer today for a 2 month f/u       1. FH: colon cancer    2. Gastroesophageal reflux disease, esophagitis presence not specified              HISTORY OF PRESENT ILLNESS: Mr.S[place] Livier Jett is a 38 y.o. male with a past history remarkable for chronic GERD, referred for evaluation of   Chief Complaint   Patient presents with    Follow-up     Patient is herer today for a 2 month f/u   . Patient has history of substernal discomfort. Today on further discussion, he stated that substernal discomfort is better. However intermittently he has a left-sided chest discomfort. This discomfort is not activity induced. Usually at night after eating food. No typical symptoms of GERD. No symptoms suggestive of extra esophageal manifestation of GERD. No dysphagia. No epigastric pain. Recent EGD did not reveal pathology in the esophagus. Random biopsies from the esophagus did not reveal microscopic esophagitis. Biopsies from the stomach did reveal inactive gastritis. H. pylori was negative. Patient is very concerned that his mother had a colon cancer when she was about 54years old. At present he does not have melena or hematochezia. No weight loss. Past Medical,Family, and Social History reviewed and does contribute to the patient presenting condition. Patient's PMH/PSH,SH,PSYCH Hx, MEDs, ALLERGIES, and ROS were all reviewed and updated in the appropriate sections.     PAST MEDICAL HISTORY:  Past Medical History:   Diagnosis Date    Hypertension        Past Surgical History:   Procedure Laterality Date    UPPER GASTROINTESTINAL ENDOSCOPY N/A 6/26/2020    EGD BIOPSY performed by Yocasta Rojas MD at [address]  MEDICATIONS:    Current Outpatient Medications:     losartan-hydrochlorothiazide (HYZAAR) 100-25 MG per tablet, Take 1 tablet by mouth daily, Disp: 90 tablet, Rfl: 0    famotidine (PEPCID) 20 MG tablet, Take 1 tablet by mouth daily as needed (Breakthrough heartburns) (Patient not taking: Reported on 9/14/2020), Disp: 90 tablet, Rfl: 3    gabapentin (NEURONTIN) 300 MG capsule, Take 1 capsule by mouth 3 times daily for 14 days. , Disp: 42 capsule, Rfl: 0    esomeprazole (NEXIUM) 20 MG delayed release capsule, Take 1 capsule by mouth daily (Patient not taking: Reported on 9/14/2020), Disp: 30 capsule, Rfl: 3    pantoprazole (PROTONIX) 40 MG tablet, Take 1 tablet by mouth every morning (before breakfast) (Patient not taking: Reported on 7/24/2020), Disp: 30 tablet, Rfl: 0    ALLERGIES:   Allergies   Allergen Reactions    Ace Inhibitors      cough       FAMILY HISTORY:       Problem Relation Age of Onset    Cancer Mother     High Blood Pressure Father          SOCIAL HISTORY:   Social History     Socioeconomic History    Marital status:      Spouse name: Not on file    Number of children: Not on file    Years of education: Not on file    Highest education level: Not on file   Occupational History    Not on file   Social Needs    Financial resource strain: Not on file    Food insecurity     Worry: Not on file     Inability: Not on file    Transportation needs     Medical: Not on file     Non-medical: Not on file   Tobacco Use    Smoking status: Never Smoker    Smokeless tobacco: Never Used   Substance and Sexual Activity    Alcohol use: No    Drug use: No    Sexual activity: Not on file   Lifestyle    Physical activity     Days per week: Not on file     Minutes per session: Not on file    Stress: Not on file   Relationships    Social connections     Talks on phone: Not on file     Gets together: Not on file     Attends Temple service: Not on file     Active member of club or organization: Not on file     Attends meetings of clubs or organizations: Not on file     Relationship status: Not on file    Intimate partner violence     Fear of current or ex partner: Not on file     Emotionally abused: Not on file     Physically abused: Not on file     Forced sexual activity: Not on file   Other Topics Concern    Not on file   Social History Narrative    Not on file         REVIEW OF SYSTEMS:         Review of Systems   Constitutional: Negative for appetite change (decrease), fatigue and unexpected weight change. HENT: Positive for sore throat (better but after eating). Negative for trouble swallowing. Respiratory: Negative for cough, choking and wheezing. Cardiovascular: Positive for chest pain and palpitations. Negative for leg swelling. Gastrointestinal: Positive for abdominal pain (ULQ) and nausea. Negative for abdominal distention, anal bleeding, blood in stool, constipation, diarrhea, rectal pain and vomiting. Heartburn, acid in mouth   Genitourinary: Negative for difficulty urinating. Musculoskeletal: Negative for arthralgias, back pain and gait problem. Allergic/Immunologic: Negative for environmental allergies and food allergies. Neurological: Negative for dizziness, weakness, light-headedness, numbness and headaches. Hematological: Does not bruise/bleed easily. Psychiatric/Behavioral: Negative for sleep disturbance. The patient is not nervous/anxious. PHYSICAL EXAMINATION: Vital signs reviewed per the nursing documentation. Temp 97.8 °F (36.6 °C)   Ht 6' (1.829 m)   Wt 232 lb 12.8 oz (105.6 kg)   BMI 31.57 kg/m²   Body mass index is 31.57 kg/m². Physical Exam  Vitals signs and nursing note reviewed. Constitutional:       General: He is not in acute distress. Appearance: He is well-developed. HENT:      Head: Normocephalic and atraumatic. Mouth/Throat:      Pharynx: No oropharyngeal exudate. Eyes:      General: No scleral icterus. Conjunctiva/sclera: Conjunctivae normal.      Pupils: Pupils are equal, round, and reactive to light. Neck:      Musculoskeletal: Normal range of motion and neck supple. Thyroid: No thyromegaly. Trachea: No tracheal deviation. Cardiovascular:      Rate and Rhythm: Normal rate and regular rhythm. Heart sounds: Normal heart sounds. No murmur. Pulmonary:      Effort: Pulmonary effort is normal. No respiratory distress. Breath sounds: Normal breath sounds. No wheezing or rales. Abdominal:      General: Bowel sounds are normal. There is no distension. Palpations: Abdomen is soft. There is no hepatomegaly or mass. Tenderness: There is no abdominal tenderness. There is no guarding. Hernia: No hernia is present. Comments: No peripheral signs of ch. Liver disease   Genitourinary:     Rectum: Normal.   Lymphadenopathy:      Cervical: No cervical adenopathy. Skin:     General: Skin is warm and dry. Findings: No erythema or rash. Neurological:      Mental Status: He is alert and oriented to person, place, and time. Cranial Nerves: No cranial nerve deficit. Psychiatric:         Thought Content:  Thought content normal.           LABORATORY DATA: Reviewed  Lab Results   Component Value Date    WBC 8.8 05/22/2020    HGB 14.2 05/22/2020    HCT 42.4 05/22/2020    MCV 90 05/22/2020     05/22/2020     05/22/2020    K 3.6 05/22/2020     05/22/2020    CO2 30 05/22/2020    BUN 17 05/22/2020    CREATININE 0.91 06/30/2020    LABPROT 7.4 12/22/2011    LABALBU 4.4 05/22/2020    BILITOT 0.5 05/22/2020    ALKPHOS 80 05/22/2020    AST 19 05/22/2020    ALT 21 05/22/2020         Lab Results   Component Value Date    RBC 4.69 05/22/2020    HGB 14.2 05/22/2020    MCV 90 05/22/2020    MCH 30.2 05/22/2020    MCHC 33.5 05/22/2020    RDW 13.6 08/27/2013    MPV 8.2 05/22/2020    BASOPCT 1.0 05/22/2020    LYMPHSABS 2.3 05/22/2020    MONOSABS 0.7 05/22/2020    NEUTROABS 5.5 05/22/2020    EOSABS 0.1 05/22/2020    BASOSABS 0.1 05/22/2020         DIAGNOSTIC TESTING:     No results found. Assessment  1. FH: colon cancer    2. Gastroesophageal reflux disease, esophagitis presence not specified        Plan    EGD findings explained to the patient and reassured. He may not need PPI therapy. However if he has any mild dyspepsia may take H2 blocker therapy. As far as the concern regarding mother's colon cancer, given his age, he may need screening exam as he is a high risk. This will be arranged. Patient is strongly encouraged to see the cardiologist regarding chest pain. Looks like he had a stress test done recently and the results are pending. He is going to have appointment with the cardiologist in the next few days. If his cardiac work-up is negative for coronary artery disease, he will have colonoscopy in October 2020. Discussed with the patient regarding colonoscopy procedure, risks and benefits, adequate colon preparation. After discussion, patient understood and verbalized her consent. Thank you for allowing me to participate in the care of Mr. Arnoldo Traylor. For any further questions please do not hesitate to contact me. I have reviewed and agree with the ROS entered by the MA/LPN.          Ashley Jacobo MD,FACP, [facility]  Board Certified in Gastroenterology and [address] [place] [address] Gastroenterology  Office #: [phone]

## 2020-09-17 ENCOUNTER — TELEPHONE (OUTPATIENT)
Dept: GASTROENTEROLOGY | Age: 40
End: 2020-09-17

## 2020-09-17 RX ORDER — SODIUM, POTASSIUM,MAG SULFATES 17.5-3.13G
SOLUTION, RECONSTITUTED, ORAL ORAL
Qty: 1 BOTTLE | Refills: 0 | Status: SHIPPED | OUTPATIENT
Start: 2020-09-17 | End: 2020-10-06

## 2020-09-17 NOTE — TELEPHONE ENCOUNTER
Contacted [facility] to schedule colonoscopy ordered at 9/14/20 visit with Dr. Yumiko Helms. Carolina Simpson states that he saw cardiology this morning ans is okay to schedule colonoscopy. Carolina Simpson is now scheduled at [facility] on Friday  [email or web address], Covid 19 testing scheduled [email or web address]. Suprep routed for signature and emailed. CLD and bowel prep instructions were also discussed.

## 2020-09-21 ENCOUNTER — HOSPITAL ENCOUNTER (OUTPATIENT)
Dept: CARDIAC CATH/INVASIVE PROCEDURES | Age: 40
Discharge: HOME | End: 2020-09-21
Payer: COMMERCIAL

## 2020-09-21 ENCOUNTER — HOSPITAL ENCOUNTER (OUTPATIENT)
Dept: PREADMISSION TESTING | Age: 40
Setting detail: SPECIMEN
Discharge: HOME | End: 2020-09-25
Payer: COMMERCIAL

## 2020-09-21 VITALS
WEIGHT: 233 LBS | BODY MASS INDEX: 31.56 KG/M2 | HEART RATE: 71 BPM | SYSTOLIC BLOOD PRESSURE: 128 MMHG | OXYGEN SATURATION: 98 % | TEMPERATURE: 98.2 F | RESPIRATION RATE: 17 BRPM | DIASTOLIC BLOOD PRESSURE: 85 MMHG | HEIGHT: 72 IN

## 2020-09-21 LAB
GFR NON-AFRICAN AMERICAN: >60 ML/MIN
GFR SERPL CREATININE-BSD FRML MDRD: >60 ML/MIN
GFR SERPL CREATININE-BSD FRML MDRD: NORMAL ML/MIN/{1.73_M2}
GLUCOSE BLD-MCNC: 110 MG/DL (ref 74–100)
PLATELET # BLD: 312 K/UL (ref 138–453)
POC CHLORIDE: 103 MMOL/L (ref 98–107)
POC CREATININE: 0.91 MG/DL (ref 0.51–1.19)
POC HEMATOCRIT: 49 % (ref 41–53)
POC HEMOGLOBIN: 16.7 G/DL (ref 13.5–17.5)
POC POTASSIUM: 4.4 MMOL/L (ref 3.5–4.5)
POC SODIUM: 145 MMOL/L (ref 138–146)

## 2020-09-21 PROCEDURE — 84132 ASSAY OF SERUM POTASSIUM: CPT

## 2020-09-21 PROCEDURE — U0003 INFECTIOUS AGENT DETECTION BY NUCLEIC ACID (DNA OR RNA); SEVERE ACUTE RESPIRATORY SYNDROME CORONAVIRUS 2 (SARS-COV-2) (CORONAVIRUS DISEASE [COVID-19]), AMPLIFIED PROBE TECHNIQUE, MAKING USE OF HIGH THROUGHPUT TECHNOLOGIES AS DESCRIBED BY CMS-2020-01-R: HCPCS

## 2020-09-21 PROCEDURE — 2709999900 HC NON-CHARGEABLE SUPPLY

## 2020-09-21 PROCEDURE — 85049 AUTOMATED PLATELET COUNT: CPT

## 2020-09-21 PROCEDURE — 84295 ASSAY OF SERUM SODIUM: CPT

## 2020-09-21 PROCEDURE — 2500000003 HC RX 250 WO HCPCS

## 2020-09-21 PROCEDURE — 82565 ASSAY OF CREATININE: CPT

## 2020-09-21 PROCEDURE — 82947 ASSAY GLUCOSE BLOOD QUANT: CPT

## 2020-09-21 PROCEDURE — 7100000001 HC PACU RECOVERY - ADDTL 15 MIN

## 2020-09-21 PROCEDURE — C1725 CATH, TRANSLUMIN NON-LASER: HCPCS

## 2020-09-21 PROCEDURE — 82435 ASSAY OF BLOOD CHLORIDE: CPT

## 2020-09-21 PROCEDURE — 2580000003 HC RX 258: Performed by: INTERNAL MEDICINE

## 2020-09-21 PROCEDURE — 7100000000 HC PACU RECOVERY - FIRST 15 MIN

## 2020-09-21 PROCEDURE — 85014 HEMATOCRIT: CPT

## 2020-09-21 PROCEDURE — C1769 GUIDE WIRE: HCPCS

## 2020-09-21 PROCEDURE — 6360000004 HC RX CONTRAST MEDICATION

## 2020-09-21 PROCEDURE — 93458 L HRT ARTERY/VENTRICLE ANGIO: CPT | Performed by: INTERNAL MEDICINE

## 2020-09-21 PROCEDURE — C1894 INTRO/SHEATH, NON-LASER: HCPCS

## 2020-09-21 PROCEDURE — 6360000002 HC RX W HCPCS

## 2020-09-21 RX ORDER — SODIUM CHLORIDE 9 MG/ML
INJECTION, SOLUTION INTRAVENOUS CONTINUOUS
Status: DISCONTINUED | OUTPATIENT
Start: 2020-09-21 | End: 2020-09-22 | Stop reason: HOSPADM

## 2020-09-21 RX ADMIN — SODIUM CHLORIDE: 9 INJECTION, SOLUTION INTRAVENOUS at 08:52

## 2020-09-21 NOTE — H&P
capsule by mouth daily  Patient not taking: Reported on 9/14/2020 7/17/20   Donte Cameron APRN - NP   pantoprazole (PROTONIX) 40 MG tablet Take 1 tablet by mouth every morning (before breakfast)  Patient not taking: Reported on 7/24/2020 6/19/20   Arvind Johnson MD         There were no vitals filed for this visit. Constitutional and General Appearance:   alert, cooperative, no distress and appears stated age  [de-identified]:  · PERRL, EOMI  Respiratory:  · Normal excursion and expansion without use of accessory muscles  · Resp Auscultation:  Good respiratory effort. No for increased work of breathing. On auscultation: clear to auscultation bilaterally  Cardiovascular:  · Regular rate and rhythm. · S1/S2  · No murmurs. · The apical impulse is not displaced  Abdomen:  · Soft  · Bowel sounds present  · Non-tender to palpation  Extremities:  · No cyanosis or clubbing  · Lower extremity edema: No.  Skin:  · Warm and dry  Neurological:  · Alert and oriented. · Moves all extremities well      Plan:    Risks, benefits, and alternatives of cardiac catheterization were discussed, in detail, with patient. Risks include, but not limited to, bleeding, requiring blood transfusion, vascular complication requiring surgery, renal failure with need of dialysis, CVA, MI, death and anesthesia complications including intubation were discussed. Patient verbalized understanding and agreed to proceed with the procedure understanding the above risks and alternatives to the procedure.         Electronically signed on 09/21/20 at 8:20 AM by:    Salvatore Turner MD   Fellow, [facility]

## 2020-09-21 NOTE — PROGRESS NOTES
TR BAND removed and site viewed by patient and wife. Wound care initiated with both. Pressure dressing applied and ambulates in halls and to restroom without distress.

## 2020-09-21 NOTE — PROGRESS NOTES
Patient admitted, consent signed and questions answered. Patient ready for procedure. Call light to reach with side rails up 2 of 2. Bilat groin hairs and right wrist hairs clipped. Family at bedside with patient. History and physical needing update.

## 2020-09-21 NOTE — PROGRESS NOTES
All discharge instructions reviewed, questions answered, paper signed and given copy. Patient discharged per ambulation with wife and belongings. Armboard secure.

## 2020-09-21 NOTE — PROGRESS NOTES
Patient received post cath to room 7 alert and awake. Assessment obtained. Post cath pathway initiated. Right radial site with TR BAND intact. No hematoma noted. Restrictions reviewed with patient and wife. Patient without complaints. Side rails up 2 of 2 with call light to reach.   Declines fluids at this time

## 2020-09-21 NOTE — PLAN OF CARE
Problem: Anxiety:  Goal: Level of anxiety will decrease  Description: Level of anxiety will decrease  9/21/2020 1237 by Steven Traylor, RN  Outcome: Completed  9/21/2020 0810 by Steven Traylor, RN  Outcome: Met This Shift     Problem: KNOWLEDGE DEFICIT  Goal: Patient/S.O. demonstrates understanding of disease process, treatment plan, medications, and discharge instructions.   Outcome: Completed

## 2020-09-21 NOTE — OP NOTE
and Risk Factors    [] Hypertension     [] Family history of CAD  [] Hyperlipidemia     [] Cerebrovascular Disease   [] Prior MI       [] Peripheral Vascular disease   [] Prior PCI              [] Diabetes Mellitus    [] Left Main PCI. [] Currently on Dialysis. [] Prior CABG. [] Currently smoker. [] Cardiac Arrest outside of healthcare facility. [] Yes    [x] No        Witnessed     [] Yes   [] No     Arrest after arrival of EMS  [] Yes   [] No     [] Cardiac Arrest at other Facility. [] Yes   [x] No    Pre-Procedure Information. Heart Failure       [] Yes    [x] No        Class  [] I      [] II  [] III    [] IV. New Diagnosis    [] Yes  [] No    HF Type      [] Systolic   [] Diastolic          [] Unknown. Diagnostic Test:   EKG       [x] Normal   [] Abnormal    New antiarrhythmia medications:    [] Yes   [] No   New onset atrial fibrillation / Flutter     [] Yes   [] No   ECG Abnormalities:      [] V. Fib   [] Patricia V. Tach           [] NS V. T   [] New LBBB           [] T. Inv  []  ST dev > 0.5 mm         [] PVC's freq  [] PVC's infrequent    Stress Test Performed:      [x] Yes    [] No     Type:     [] Stress Echo   [] Exercise Stress Test (no imaging)      [] Stress Nuclear  [x] Stress Imaging     Results   [] Negative   [x] Positive        [] Indeterminate  [] Unavailable     If Positive/ Risk / Extent of Ischemia:       [] Low  [] Intermediate         [] High  [] Unavailable      Cardiac CTA Performed:     [] Yes    [x] No      Results   [] CAD   [] Non obstructive CAD      [] No CAD   [] Uncertain      [] Unknown   [] Structural Disease.      Pre Procedure Medications:   [] Yes    [x] No         [] ASA   [] Beta Blockers      [] Nitrate   [] Ca Channel Blockers      [] Ranolazine   [] Statin       [] Plavix/Others antiplatelets      Electronically signed on 9/21/2020 at 8:22 AM by:    Kusum Martinez MD  Fellow, [address]    I have reviewed the case / procedure with resident / fellow  I have examined the patient personally  Patient agree with treatment plan, correction innotes was made as appropriate, and discussed final arrangement based on  my evaluation and exam.    Risk and benefit of procedure if planned were explained in details. Procedure was performed by me, with all aspect of the procedure being done using standard protocol. Note was modified based on my own assessment and treatment.     Denita Dwyer MD  [facility]

## 2020-09-22 ENCOUNTER — TELEPHONE (OUTPATIENT)
Dept: GASTROENTEROLOGY | Age: 40
End: 2020-09-22

## 2020-09-22 LAB — SARS-COV-2, NAA: NOT DETECTED

## 2020-09-24 ENCOUNTER — TELEPHONE (OUTPATIENT)
Dept: GASTROENTEROLOGY | Age: 40
End: 2020-09-24

## 2020-09-24 NOTE — TELEPHONE ENCOUNTER
Spoke to ISSEKA to change arrival time of procedure on Friday 9/25/20 to 7:15 am, patient agreeable.

## 2020-09-25 ENCOUNTER — HOSPITAL ENCOUNTER (OUTPATIENT)
Age: 40
Setting detail: OUTPATIENT SURGERY
Discharge: HOME | End: 2020-09-25
Attending: INTERNAL MEDICINE | Admitting: INTERNAL MEDICINE
Payer: COMMERCIAL

## 2020-09-25 ENCOUNTER — ANESTHESIA EVENT (OUTPATIENT)
Dept: ENDOSCOPY | Age: 40
End: 2020-09-25
Payer: COMMERCIAL

## 2020-09-25 ENCOUNTER — ANESTHESIA (OUTPATIENT)
Dept: ENDOSCOPY | Age: 40
End: 2020-09-25
Payer: COMMERCIAL

## 2020-09-25 VITALS
RESPIRATION RATE: 18 BRPM | DIASTOLIC BLOOD PRESSURE: 63 MMHG | SYSTOLIC BLOOD PRESSURE: 99 MMHG | OXYGEN SATURATION: 99 %

## 2020-09-25 VITALS
HEIGHT: 72 IN | OXYGEN SATURATION: 96 % | BODY MASS INDEX: 30.48 KG/M2 | RESPIRATION RATE: 23 BRPM | TEMPERATURE: 97.4 F | HEART RATE: 67 BPM | SYSTOLIC BLOOD PRESSURE: 110 MMHG | DIASTOLIC BLOOD PRESSURE: 78 MMHG | WEIGHT: 225 LBS

## 2020-09-25 PROCEDURE — 7100000000 HC PACU RECOVERY - FIRST 15 MIN: Performed by: INTERNAL MEDICINE

## 2020-09-25 PROCEDURE — 3700000000 HC ANESTHESIA ATTENDED CARE: Performed by: INTERNAL MEDICINE

## 2020-09-25 PROCEDURE — 2709999900 HC NON-CHARGEABLE SUPPLY: Performed by: INTERNAL MEDICINE

## 2020-09-25 PROCEDURE — 45378 DIAGNOSTIC COLONOSCOPY: CPT | Performed by: INTERNAL MEDICINE

## 2020-09-25 PROCEDURE — 3700000001 HC ADD 15 MINUTES (ANESTHESIA): Performed by: INTERNAL MEDICINE

## 2020-09-25 PROCEDURE — 7100000001 HC PACU RECOVERY - ADDTL 15 MIN: Performed by: INTERNAL MEDICINE

## 2020-09-25 PROCEDURE — 2580000003 HC RX 258: Performed by: ANESTHESIOLOGY

## 2020-09-25 PROCEDURE — 6360000002 HC RX W HCPCS: Performed by: NURSE ANESTHETIST, CERTIFIED REGISTERED

## 2020-09-25 PROCEDURE — 3609027000 HC COLONOSCOPY: Performed by: INTERNAL MEDICINE

## 2020-09-25 RX ORDER — HYDRALAZINE HYDROCHLORIDE 20 MG/ML
5 INJECTION INTRAMUSCULAR; INTRAVENOUS EVERY 10 MIN PRN
Status: DISCONTINUED | OUTPATIENT
Start: 2020-09-25 | End: 2020-09-25 | Stop reason: HOSPADM

## 2020-09-25 RX ORDER — ONDANSETRON 2 MG/ML
4 INJECTION INTRAMUSCULAR; INTRAVENOUS
Status: DISCONTINUED | OUTPATIENT
Start: 2020-09-25 | End: 2020-09-25 | Stop reason: HOSPADM

## 2020-09-25 RX ORDER — SODIUM CHLORIDE, SODIUM LACTATE, POTASSIUM CHLORIDE, CALCIUM CHLORIDE 600; 310; 30; 20 MG/100ML; MG/100ML; MG/100ML; MG/100ML
INJECTION, SOLUTION INTRAVENOUS CONTINUOUS
Status: DISCONTINUED | OUTPATIENT
Start: 2020-09-25 | End: 2020-09-25 | Stop reason: HOSPADM

## 2020-09-25 RX ORDER — PROPOFOL 10 MG/ML
INJECTION, EMULSION INTRAVENOUS CONTINUOUS PRN
Status: DISCONTINUED | OUTPATIENT
Start: 2020-09-25 | End: 2020-09-25 | Stop reason: SDUPTHER

## 2020-09-25 RX ORDER — 0.9 % SODIUM CHLORIDE 0.9 %
500 INTRAVENOUS SOLUTION INTRAVENOUS
Status: DISCONTINUED | OUTPATIENT
Start: 2020-09-25 | End: 2020-09-25 | Stop reason: HOSPADM

## 2020-09-25 RX ORDER — PROMETHAZINE HYDROCHLORIDE 25 MG/ML
6.25 INJECTION, SOLUTION INTRAMUSCULAR; INTRAVENOUS
Status: DISCONTINUED | OUTPATIENT
Start: 2020-09-25 | End: 2020-09-25

## 2020-09-25 RX ORDER — PROPOFOL 10 MG/ML
INJECTION, EMULSION INTRAVENOUS PRN
Status: DISCONTINUED | OUTPATIENT
Start: 2020-09-25 | End: 2020-09-25 | Stop reason: SDUPTHER

## 2020-09-25 RX ORDER — LABETALOL HYDROCHLORIDE 5 MG/ML
5 INJECTION, SOLUTION INTRAVENOUS EVERY 10 MIN PRN
Status: DISCONTINUED | OUTPATIENT
Start: 2020-09-25 | End: 2020-09-25 | Stop reason: HOSPADM

## 2020-09-25 RX ORDER — DIPHENHYDRAMINE HYDROCHLORIDE 50 MG/ML
12.5 INJECTION INTRAMUSCULAR; INTRAVENOUS
Status: DISCONTINUED | OUTPATIENT
Start: 2020-09-25 | End: 2020-09-25 | Stop reason: HOSPADM

## 2020-09-25 RX ADMIN — PROPOFOL 125 MCG/KG/MIN: 10 INJECTION, EMULSION INTRAVENOUS at 09:09

## 2020-09-25 RX ADMIN — PROPOFOL 80 MG: 10 INJECTION, EMULSION INTRAVENOUS at 09:09

## 2020-09-25 RX ADMIN — SODIUM CHLORIDE, POTASSIUM CHLORIDE, SODIUM LACTATE AND CALCIUM CHLORIDE: 600; 310; 30; 20 INJECTION, SOLUTION INTRAVENOUS at 08:35

## 2020-09-25 ASSESSMENT — PULMONARY FUNCTION TESTS
PIF_VALUE: 0
PIF_VALUE: 1
PIF_VALUE: 0

## 2020-09-25 ASSESSMENT — PAIN SCALES - GENERAL: PAINLEVEL_OUTOF10: 0

## 2020-09-25 ASSESSMENT — PAIN DESCRIPTION - DESCRIPTORS: DESCRIPTORS: CRAMPING

## 2020-09-25 ASSESSMENT — PAIN - FUNCTIONAL ASSESSMENT: PAIN_FUNCTIONAL_ASSESSMENT: 0-10

## 2020-09-25 NOTE — H&P
insecurity     Worry: Not on file     Inability: Not on file    Transportation needs     Medical: Not on file     Non-medical: Not on file   Tobacco Use    Smoking status: Never Smoker    Smokeless tobacco: Never Used   Substance and Sexual Activity    Alcohol use: No    Drug use: No    Sexual activity: Not on file   Lifestyle    Physical activity     Days per week: Not on file     Minutes per session: Not on file    Stress: Not on file   Relationships    Social connections     Talks on phone: Not on file     Gets together: Not on file     Attends Scientologist service: Not on file     Active member of club or organization: Not on file     Attends meetings of clubs or organizations: Not on file     Relationship status: Not on file    Intimate partner violence     Fear of current or ex partner: Not on file     Emotionally abused: Not on file     Physically abused: Not on file     Forced sexual activity: Not on file   Other Topics Concern    Not on file   Social History Narrative    Not on file           REVIEW OF SYSTEMS      Allergies   Allergen Reactions    Ace Inhibitors      cough       No current facility-administered medications on file prior to encounter. Current Outpatient Medications on File Prior to Encounter   Medication Sig Dispense Refill    metoprolol tartrate (LOPRESSOR) 25 MG tablet Take 1 tablet by mouth 2 times daily 30 tablet 3    Na Sulfate-K Sulfate-Mg Sulf 17.5-3.13-1.6 GM/177ML SOLN Please follow instructions given by office 1 Bottle 0    famotidine (PEPCID) 20 MG tablet Take 1 tablet by mouth daily as needed (Breakthrough heartburns) 90 tablet 3    losartan-hydrochlorothiazide (HYZAAR) 100-25 MG per tablet Take 1 tablet by mouth daily 90 tablet 0       Negative except for what is mentioned in the HPI. GENERAL PHYSICAL EXAM     Vitals:  See vital signs for RN Flow sheet.       GENERAL APPEARANCE:   Jailyn Harris is 38 y.o.,  male, moderately obese, nourished, conscious, alert. Does not appear to be distress or pain at this time. SKIN:  Warm, dry, no cyanosis or jaundice. HEAD:  Normocephalic, atraumatic, no swelling or tenderness. EYES:  Pupils equal, reactive to light. EARS:  No discharge, no marked hearing loss. NOSE:  No rhinorrhea, epistaxis or septal deformity. THROAT:  Not congested. No ulceration bleeding or discharge. NECK:  No stiffness, trachea central.  No palpable masses or L.N.                 CHEST:  Symmetrical and equal on expansion. HEART:  RRR S1 > S2. No audible murmurs or gallops. LUNGS:  Equal on expansion, normal breath sounds. No adventitious sounds. ABDOMEN:  Obese. Soft on palpation. No dysphagia, No localized tenderness. No guarding or rigidity. No palpable hepatosplenomegaly. LYMPHATICS:  No palpable cervical lymphadenopathy. LOCOMOTOR, BACK AND SPINE:  No tenderness or deformities. EXTREMITIES:  Symmetrical, no pretibial edema. Noes sign negative. No discoloration or ulcerations. NEUROLOGIC:  The patient is conscious, alert, oriented,Cranial nerve II-XII intact, taste and smell were not examined. No apparent focal sensory or motor deficits.              PROVISIONAL DIAGNOSES / SURGERY:        FAMILY HISTORY OF COLON CANCER    COLONOSCOPY DIAGNOSTIC       Patient Active Problem List    Diagnosis Date Noted    Abdominal pain, right upper quadrant 07/17/2020    Essential hypertension 01/29/2018           KEENA Kim - CNP on 9/25/2020 at 6:08 AM

## 2020-09-25 NOTE — ANESTHESIA PRE PROCEDURE
Department of Anesthesiology  Preprocedure Note       Name:  Jeffery Whiteside   Age:  38 y.o.  :  1980                                          MRN:  [id number]         Date:  2020      Surgeon: Steven Moseley):  Retia Oppenheim, MD    Procedure: Procedure(s):  COLONOSCOPY DIAGNOSTIC    Medications prior to admission:   Prior to Admission medications    Medication Sig Start Date End Date Taking? Authorizing Provider   metoprolol tartrate (LOPRESSOR) 25 MG tablet Take 1 tablet by mouth 2 times daily 20  Yes Jamie Roa MD   Na Sulfate-K Sulfate-Mg Sulf 17.5-3.13-1.6 GM/177ML SOLN Please follow instructions given by office 20  Yes Retia Oppenheim, MD   losartan-hydrochlorothiazide (HYZAAR) 100-25 MG per tablet Take 1 tablet by mouth daily 20  Yes KEENA Giles - CNP   famotidine (PEPCID) 20 MG tablet Take 1 tablet by mouth daily as needed (Breakthrough heartburns) 20   KEENA Ventura - NP       Current medications:    Current Facility-Administered Medications   Medication Dose Route Frequency Provider Last Rate Last Dose    lactated ringers infusion   Intravenous Continuous Catie Hull MD           Allergies:     Allergies   Allergen Reactions    Ace Inhibitors      cough       Problem List:    Patient Active Problem List   Diagnosis Code    Essential hypertension I10    Abdominal pain, right upper quadrant R10.11       Past Medical History:        Diagnosis Date    GERD (gastroesophageal reflux disease)     H/O cardiovascular stress test 2020    SMALL INFERIOR ISCHEMIA AREA    History of shingles 2020    Hypertension     Palpitations        Past Surgical History:        Procedure Laterality Date    CARDIAC CATHETERIZATION  2020    NML CORS / NML LV FUNCTION  /  DR Medardo Arzate    UPPER GASTROINTESTINAL ENDOSCOPY N/A 2020    EGD BIOPSY performed by Retia Oppenheim, MD at [address] [place] [address] History:    Social History     Tobacco Use    Smoking status: Never Smoker    Smokeless tobacco: Never Used   Substance Use Topics    Alcohol use: No                                Counseling given: Not Answered      Vital Signs (Current):   Vitals:    09/25/20 0810   BP: 120/80   Pulse: 69   Resp: 16   Temp: 98 °F (36.7 °C)   TempSrc: Infrared   SpO2: 97%   Weight: 225 lb (102.1 kg)   Height: 6' (1.829 m)                                              BP Readings from Last 3 Encounters:   09/25/20 120/80   09/21/20 128/85   08/03/20 122/82       NPO Status: Time of last liquid consumption: 2330                        Time of last solid consumption: 1800                        Date of last liquid consumption: 09/24/20                        Date of last solid food consumption: 09/23/20    BMI:   Wt Readings from Last 3 Encounters:   09/25/20 225 lb (102.1 kg)   09/21/20 233 lb (105.7 kg)   09/14/20 232 lb 12.8 oz (105.6 kg)     Body mass index is 30.52 kg/m². CBC:   Lab Results   Component Value Date    WBC 8.8 05/22/2020    RBC 4.69 05/22/2020    RBC 5.06 12/22/2011    HGB 14.2 05/22/2020    HCT 42.4 05/22/2020    MCV 90 05/22/2020    RDW 13.6 08/27/2013     09/21/2020     12/22/2011       CMP:   Lab Results   Component Value Date     05/22/2020    K 3.6 05/22/2020     05/22/2020    CO2 30 05/22/2020    BUN 17 05/22/2020    CREATININE 0.91 09/21/2020    CREATININE 0.91 06/30/2020    GFRAA >60 06/30/2020    LABGLOM >60 09/21/2020    GLUCOSE 92 05/22/2020    GLUCOSE 108 12/22/2011    PROT 7.5 04/18/2019    CALCIUM 9.6 05/22/2020    BILITOT 0.5 05/22/2020    ALKPHOS 80 05/22/2020    AST 19 05/22/2020    ALT 21 05/22/2020       POC Tests: No results for input(s): POCGLU, POCNA, POCK, POCCL, POCBUN, POCHEMO, POCHCT in the last 72 hours.     Coags: No results found for: PROTIME, INR, APTT    HCG (If Applicable): No results found for: PREGTESTUR, PREGSERUM, HCG, HCGQUANT     ABGs: No results found for: PHART, PO2ART, RNT0HYY, RBF4XVT, BEART, M7QQCKKB     Type & Screen (If Applicable):  No results found for: LABABO, LABRH    Drug/Infectious Status (If Applicable):  No results found for: HIV, HEPCAB    COVID-19 Screening (If Applicable):   Lab Results   Component Value Date    COVID19 Not Detected 09/21/2020    COVID19 Not Detected 06/22/2020         Anesthesia Evaluation  Patient summary reviewed and Nursing notes reviewed no history of anesthetic complications:   Airway: Mallampati: III  TM distance: >3 FB   Neck ROM: full  Mouth opening: > = 3 FB Dental: normal exam         Pulmonary:Negative Pulmonary ROS and normal exam  breath sounds clear to auscultation                             Cardiovascular:  Exercise tolerance: good (>4 METS),   (+) hypertension:,         Rhythm: regular  Rate: normal           Beta Blocker:  Dose within 24 Hrs         Neuro/Psych:   Negative Neuro/Psych ROS              GI/Hepatic/Renal:   (+) GERD: well controlled, bowel prep,           Endo/Other: Negative Endo/Other ROS                    Abdominal:           Vascular: negative vascular ROS. Anesthesia Plan      MAC     ASA 2       Induction: intravenous. MIPS: Prophylactic antiemetics administered. Anesthetic plan and risks discussed with patient. Plan discussed with CRNA.                   Claribel Alan MD   9/25/2020

## 2020-09-25 NOTE — ANESTHESIA POSTPROCEDURE EVALUATION
Department of Anesthesiology  Postprocedure Note    Patient: Jeffery Whiteside  MRN: [id number]  YOB: 1980  Date of evaluation: 9/25/2020  Time:  10:49 AM     Procedure Summary     Date:  09/25/20 Room / Location:  [address] [place] [address] ENDO 01 / [facility] ENDO    Anesthesia Start:  0906 Anesthesia Stop:  2664    Procedure:  COLONOSCOPY DIAGNOSTIC (N/A ) Diagnosis:       (FAMILY HISTORY COLON CANCER)      (PAT ON ADMIT)    Surgeon:  Retia Oppenheim, MD Responsible Provider:  Catie Hull MD    Anesthesia Type:  MAC ASA Status:  2          Anesthesia Type: MAC    Pako Phase I: Pako Score: 10    Pako Phase II:      Last vitals: Reviewed and per EMR flowsheets.        Anesthesia Post Evaluation    Comments: POST- ANESTHESIA EVALUATION       Pt Name: Jeffery Whiteside  MRN: [id number]  YOB: 1980  Date of evaluation: 9/25/2020  Time:  10:49 AM      /78   Pulse 67   Temp 97.4 °F (36.3 °C)   Resp 23   Ht 6' (1.829 m)   Wt 225 lb (102.1 kg)   SpO2 96%   BMI 30.52 kg/m²      Consciousness Level  Awake  Cardiopulmonary Status  Stable  Pain Adequately Treated YES  Nausea / Vomiting  NO  Adequate Hydration  YES  Anesthesia Related Complications NONE      Electronically signed by Catie Hull MD on 9/25/2020 at 10:49 AM

## 2020-09-25 NOTE — OP NOTE
COLONOSCOPY    DATE OF PROCEDURE: 9/25/2020    SURGEON: Rebecca Mckinnon MD    ASSISTANT: None    PREOPERATIVE DIAGNOSIS: Screening colonoscopy    POSTOPERATIVE DIAGNOSIS: No lesions seen    OPERATION: Total colonoscopy     ANESTHESIA: MAC    ESTIMATED BLOOD LOSS: None    COMPLICATIONS: None     SPECIMENS:  Was Not Obtained    HISTORY: The patient is a 38y.o. year old male with history of above preop diagnosis. I recommended colonoscopy with possible biopsy or polypectomy and I explained the risk, benefits, expected outcome, and alternatives to the procedure. Risks included but are not limited to bleeding, infection, respiratory distress, hypotension, and perforation of the colon and possibility of missing a lesion. The patient understands and is in agreement. PROCEDURE:  The patient's SPO2 remained above 90% throughout the procedure. Digital rectal exam was normal.  The colonoscope was inserted through the anus into the rectum and advanced under direct vision to the cecum without difficulty. Terminal ileum was examined for approximately 2 inches. The prep was good. Findings:  Terminal ileum: normal    Cecum/Ascending colon: normal, also examined in the retroflexed view  Transverse colon: normal    Descending/Sigmoid colon: normal    Rectum/Anus: examined in normal and retroflexed positions and was normal    Withdrawal Time was (minutes): 12          The colon was decompressed and the scope was removed. The patient tolerated the procedure without unusual events. During the procedure, the patient's blood pressure, pulse and oxygen saturation remained stable and documented. No unusual events occurred during the procedure. Patient was transferred to recovery room and will be discharged when criteria is met. Recommendations/Plan:   1. F/U Biopsies  2. F/U In Office as instructed  3. Discussed with the family  4. High fiber diet   5.  Precautions to avoid constipation     Next colonoscopy: 5 years.   Patient has high risk for colon cancer    Electronically signed by Lennie Pang MD  on 9/25/2020 at 9:46 AM

## 2020-10-06 ENCOUNTER — OFFICE VISIT (OUTPATIENT)
Dept: FAMILY MEDICINE CLINIC | Age: 40
End: 2020-10-06
Payer: COMMERCIAL

## 2020-10-06 ENCOUNTER — HOSPITAL ENCOUNTER (OUTPATIENT)
Age: 40
Setting detail: SPECIMEN
Discharge: HOME | End: 2020-10-06
Payer: COMMERCIAL

## 2020-10-06 VITALS
SYSTOLIC BLOOD PRESSURE: 116 MMHG | DIASTOLIC BLOOD PRESSURE: 82 MMHG | TEMPERATURE: 97.8 F | RESPIRATION RATE: 16 BRPM | BODY MASS INDEX: 32.01 KG/M2 | OXYGEN SATURATION: 98 % | WEIGHT: 236 LBS | HEART RATE: 76 BPM

## 2020-10-06 PROBLEM — K21.9 GASTROESOPHAGEAL REFLUX DISEASE WITHOUT ESOPHAGITIS: Status: ACTIVE | Noted: 2020-10-06

## 2020-10-06 LAB
ABO/RH: NORMAL
ALBUMIN SERPL-MCNC: 4.3 G/DL (ref 3.5–5.2)
ALBUMIN/GLOBULIN RATIO: 1.5 (ref 1–2.5)
ALP BLD-CCNC: 90 U/L (ref 40–129)
ALT SERPL-CCNC: 22 U/L (ref 5–41)
ANION GAP SERPL CALCULATED.3IONS-SCNC: 11 MMOL/L (ref 9–17)
AST SERPL-CCNC: 18 U/L
BILIRUB SERPL-MCNC: 0.46 MG/DL (ref 0.3–1.2)
BUN BLDV-MCNC: 18 MG/DL (ref 6–20)
BUN/CREAT BLD: ABNORMAL (ref 9–20)
CALCIUM SERPL-MCNC: 9.3 MG/DL (ref 8.6–10.4)
CHLORIDE BLD-SCNC: 105 MMOL/L (ref 98–107)
CO2: 25 MMOL/L (ref 20–31)
CREAT SERPL-MCNC: 0.85 MG/DL (ref 0.7–1.2)
GFR AFRICAN AMERICAN: >60 ML/MIN
GFR NON-AFRICAN AMERICAN: >60 ML/MIN
GFR SERPL CREATININE-BSD FRML MDRD: ABNORMAL ML/MIN/{1.73_M2}
GFR SERPL CREATININE-BSD FRML MDRD: ABNORMAL ML/MIN/{1.73_M2}
GLUCOSE BLD-MCNC: 101 MG/DL (ref 70–99)
LIPASE: 21 U/L (ref 13–60)
POTASSIUM SERPL-SCNC: 4 MMOL/L (ref 3.7–5.3)
SODIUM BLD-SCNC: 141 MMOL/L (ref 135–144)
TOTAL PROTEIN: 7.2 G/DL (ref 6.4–8.3)

## 2020-10-06 PROCEDURE — 90686 IIV4 VACC NO PRSV 0.5 ML IM: CPT | Performed by: NURSE PRACTITIONER

## 2020-10-06 PROCEDURE — 99214 OFFICE O/P EST MOD 30 MIN: CPT | Performed by: NURSE PRACTITIONER

## 2020-10-06 PROCEDURE — 90471 IMMUNIZATION ADMIN: CPT | Performed by: NURSE PRACTITIONER

## 2020-10-06 RX ORDER — SUCRALFATE ORAL 1 G/10ML
1 SUSPENSION ORAL 3 TIMES DAILY
Qty: 1800 ML | Refills: 0 | Status: SHIPPED | OUTPATIENT
Start: 2020-10-06 | End: 2020-11-09 | Stop reason: ALTCHOICE

## 2020-10-06 ASSESSMENT — ENCOUNTER SYMPTOMS
DIARRHEA: 0
SHORTNESS OF BREATH: 0
RHINORRHEA: 0
CHEST TIGHTNESS: 0
ABDOMINAL DISTENTION: 0
BACK PAIN: 0
VOMITING: 0
COUGH: 0
CONSTIPATION: 0
SORE THROAT: 0
NAUSEA: 1
ABDOMINAL PAIN: 1

## 2020-10-06 NOTE — PROGRESS NOTES
Lawyer Bravo, APRN-CNP  [facility]  [address] [address]  [address] [address]  Dept: [phone]  Dept Fax: [phone]     Patient ID: Isidro Mills is a 38 y.o. male. HPI    Pt here today for f/u on chronic medical problems HTN, GERD, go over labs and/or diagnostic studies, and medication refills. He is a patient of Dr. Ron Monday but, per patient, was unable to get an appointment due to conflicting hours. He continues to complain of left upper quadrant pain and nausea. He did recently undergo a cardiac workup, which did include a heart cath. It was negative. He also saw Dr. Cristian Mcgregor and had EGD/Colonoscopy. He has been on Protonix, Nexium and Pepcid and has experienced minimal relief. Lab work and CT scan have been essentially unremarkable. Pt denies any fever or chills. Pt today denies any HA, chest pain, or SOB. Pt denies any N/V/D/C or abdominal pain. The patient's past medical, surgical, social, and family history as well as his current medications and allergies were reviewed as documented in today's encounter by RICHARD Mackenzie    Current Outpatient Medications on File Prior to Visit   Medication Sig Dispense Refill    metoprolol tartrate (LOPRESSOR) 25 MG tablet Take 1 tablet by mouth 2 times daily 30 tablet 3    famotidine (PEPCID) 20 MG tablet Take 1 tablet by mouth daily as needed (Breakthrough heartburns) 90 tablet 3    losartan-hydrochlorothiazide (HYZAAR) 100-25 MG per tablet Take 1 tablet by mouth daily 90 tablet 0     No current facility-administered medications on file prior to visit. Subjective:     Review of Systems   Constitutional: Negative for activity change, fatigue and fever (denies). HENT: Negative for congestion, ear pain, rhinorrhea and sore throat. Respiratory: Negative for cough, chest tightness and shortness of breath. Cardiovascular: Negative for chest pain and palpitations. Gastrointestinal: Positive for abdominal pain (left upper quadrant) and nausea (denies). Negative for abdominal distention, constipation, diarrhea (denies) and vomiting (denies). Endocrine: Negative for polydipsia, polyphagia and polyuria. Genitourinary: Negative for difficulty urinating and dysuria. Musculoskeletal: Negative for arthralgias, back pain and myalgias. Skin: Negative for rash. Neurological: Negative for dizziness, weakness, light-headedness and headaches. Hematological: Negative for adenopathy. Psychiatric/Behavioral: Negative for agitation and behavioral problems. The patient is not nervous/anxious. Objective:     Physical Exam  Vitals signs reviewed. Constitutional:       General: He is not in acute distress. Appearance: Normal appearance. He is well-developed. HENT:      Head: Normocephalic and atraumatic. Right Ear: Hearing and external ear normal.      Left Ear: Hearing and external ear normal.      Nose: Nose normal. No congestion. Right Sinus: No maxillary sinus tenderness or frontal sinus tenderness. Left Sinus: No maxillary sinus tenderness or frontal sinus tenderness. Mouth/Throat:      Lips: Pink. No lesions. Mouth: Mucous membranes are moist. No oral lesions. Tongue: No lesions. Pharynx: Oropharynx is clear. No oropharyngeal exudate or posterior oropharyngeal erythema. Eyes:      Extraocular Movements: Extraocular movements intact. Conjunctiva/sclera: Conjunctivae normal.      Pupils: Pupils are equal, round, and reactive to light. Neck:      Musculoskeletal: Full passive range of motion without pain and normal range of motion. Thyroid: No thyroid mass. Cardiovascular:      Rate and Rhythm: Normal rate and regular rhythm. Pulses: Normal pulses. Heart sounds: Normal heart sounds. No murmur. Pulmonary:      Effort: Pulmonary effort is normal. No respiratory distress.       Breath sounds: Normal breath sounds and air entry. No wheezing, rhonchi or rales. Abdominal:      General: Bowel sounds are normal. There is no distension. Palpations: Abdomen is soft. Tenderness: There is no abdominal tenderness. Musculoskeletal: Normal range of motion. Right lower leg: No edema. Left lower leg: No edema. Lymphadenopathy:      Cervical: No cervical adenopathy. Skin:     General: Skin is warm and dry. Capillary Refill: Capillary refill takes less than 2 seconds. Findings: No rash. Neurological:      General: No focal deficit present. Mental Status: He is alert and oriented to person, place, and time. Deep Tendon Reflexes: Reflexes normal.   Psychiatric:         Attention and Perception: Attention and perception normal.         Mood and Affect: Mood and affect normal.         Speech: Speech normal.         Behavior: Behavior normal. Behavior is cooperative. Cognition and Memory: Memory normal.       I have reviewed all the lab results. There are some abnormalities that are not critical to the patient's health, but did discuss them with him at this visit. Assessment:      Diagnosis Orders   1. Essential hypertension     2. Tachycardia     3. Palpitations     4. Family history of colon cancer     5. Gastroesophageal reflux disease without esophagitis     6. Left upper quadrant abdominal pain  sucralfate (CARAFATE) 1 GM/10ML suspension    Comprehensive Metabolic Panel    Lipase   7. Encounter for blood typing  ABO/RH   8. Need for influenza vaccination  INFLUENZA, QUADV, 3 YRS AND OLDER, IM PF, PREFILL SYR OR SDV, 0.5ML (AFLURIA QUADV, PF)     Plan:     1. Essential hypertension  2. Tachycardia  3.  Palpitations  - Stable: Medication re-filled as needed, con't medications as prescribed, con't current tx plan  - Continue Hyzaar as previously prescribed  - Continue Lopressor as previously prescribed  - Will cont to follow with Dr. Kortney Elliott as instructed  - Cardiac cath 9/21 with normal coronary arteries and normal LV function- added BB for PAC's and palpitations  - Education provided on maintaining a low sodium diet and routine exercise. - Advised to seek emergent tx if SBP>180 and/or DBP>110, any chest pain, h/a or vision changes     4. Gastroesophageal reflux disease without esophagitis  5. Left upper quadrant abdominal pain  6. Family history of colon cancer  - CT 6/30/2020 essentially negative except punctate non obstructing left renal calculus. Borderline enlarged spleen noted. - CBC, LFT's noted from May (negative)  - EGD by Dr. Chantel Romero 6/26/2020  - Colonoscopy by Dr. Chantel Romero 9/25/2020-negative (repeat in 5 years)  - Has been on Protonix, Nexium and Pepcid with minimal relief  - Will try Carafate  - Will recheck CBC, CMP, LFT, lipase and call with results  - sucralfate (CARAFATE) 1 GM/10ML suspension; Take 10 mLs by mouth 3 times daily  Dispense: 1800 mL; Refill: 0  - Comprehensive Metabolic Panel; Future  - Lipase; Future    7. Encounter for blood typing  - Patient requesting to know his blood type, will order  - ABO/RH; Future    8. Need for influenza vaccination  - After obtaining informed consent, the immunization is given by RICHARD Gamble  - Patient tolerated well  - INFLUENZA, QUADV, 3 YRS AND OLDER, IM PF, PREFILL SYR OR SDV, 0.5ML (AFLURIA QUADV, PF)    - Rest of systems unchanged, continue current treatments. - Medications, labs, diagnostic studies, consultations and follow-up as documented in this encounter.  Rest of systems unchanged, continue current treatments    KEENA Morris-CNP

## 2020-10-06 NOTE — PROGRESS NOTES
Visit Information    Have you changed or started any medications since your last visit including any over-the-counter medicines, vitamins, or herbal medicines? no   Have you stopped taking any of your medications? Is so, why? -  no  Are you having any side effects from any of your medications? - no    Have you seen any other physician or provider since your last visit? yes - Dr.s Regis Blake   Have you had any other diagnostic tests since your last visit? yes - EGD, Colonoscopy, LHC   Have you been seen in the emergency room and/or had an admission in a hospital since we last saw you?  no   Have you had your routine dental cleaning in the past 6 months?  no     Do you have an active MyChart account? If no, what is the barrier? Yes    Patient Care Team:  Hayde Patiño DO as PCP - General (Family Medicine)  Hayde Patiño DO as PCP - [facility]  Allyssa Alvarado MD as Consulting Physician (Cardiology)  Zachariah Parmar DO as Consulting Physician (Bariatric Surgery)  Yared Mcfarlane MD as Consulting Physician (Gastroenterology)    Medical History Review  Past Medical, Family, and Social History reviewed and does contribute to the patient presenting condition    Health Maintenance   Topic Date Due    Flu vaccine (1) 09/01/2020    Potassium monitoring  09/21/2021    Creatinine monitoring  09/21/2021    Lipid screen  05/22/2025    DTaP/Tdap/Td vaccine (2 - Td) 04/18/2029    HIV screen  Completed    Hepatitis A vaccine  Aged Out    Hepatitis B vaccine  Aged Out    Hib vaccine  Aged Out    Meningococcal (ACWY) vaccine  Aged Out    Pneumococcal 0-64 years Vaccine  Aged Out     Patient/Caregiver verbalize understanding of medications. Yes    Vaccine Information Sheet, \"Influenza - Inactivated\"  given to Yoana Flores, or parent/legal guardian of  Yoana Flores and verbalized understanding. Patient responses:    Have you ever had a reaction to a flu vaccine?  No  Are you able to eat eggs without adverse effects? Yes  Do you have any current illness? No  Have you ever had Guillian Mat[place] Syndrome? No    Flu vaccine given per order. Please see immunization tab.

## 2020-11-09 ENCOUNTER — OFFICE VISIT (OUTPATIENT)
Dept: FAMILY MEDICINE CLINIC | Age: 40
End: 2020-11-09
Payer: COMMERCIAL

## 2020-11-09 VITALS
WEIGHT: 237 LBS | DIASTOLIC BLOOD PRESSURE: 88 MMHG | HEIGHT: 72 IN | BODY MASS INDEX: 32.1 KG/M2 | TEMPERATURE: 97.7 F | HEART RATE: 67 BPM | SYSTOLIC BLOOD PRESSURE: 132 MMHG | OXYGEN SATURATION: 97 %

## 2020-11-09 PROCEDURE — 99213 OFFICE O/P EST LOW 20 MIN: CPT | Performed by: FAMILY MEDICINE

## 2020-11-09 NOTE — PROGRESS NOTES
Abdominal Pain (for 6 months)      Patient states he recently underwent heart cath. Results were found to be completely normal with no evidence of coronary artery disease. He continues to have gastroesophageal reflux symptoms. He continues to feel tired lately cardiologist started him on a beta blocker. He has concerns about using the beta blocker for the list of side effects including erectile function. Patient states he is not sure wants to take The beta blocker medication from here. He states the cardiologist felt it was useful because there were occasional ectopic heartbeats noted on some of the electrophysiologic studies that were conducted. We discussed the use, benefit, and risk of taking beta blocker medication's. Patient states he would like to go without it at this time. I concur. /88 (Site: Left Upper Arm, Position: Sitting, Cuff Size: Medium Adult)   Pulse 67   Temp 97.7 °F (36.5 °C) (Temporal)   Ht 6' (1.829 m)   Wt 237 lb (107.5 kg)   SpO2 97%   BMI 32.14 kg/m²       Review of Systems   Constitutional: Negative for chills and fever. HENT: Negative for sore throat. Eyes: Negative for pain. Respiratory: Negative for cough and shortness of breath. Cardiovascular: Negative for chest pain, palpitations and leg swelling. Gastrointestinal: Negative for constipation, nausea and vomiting. Genitourinary: Negative for dysuria. Musculoskeletal: Negative for back pain and myalgias. Skin: Negative for rash. Neurological: Negative for dizziness and weakness. Hematological: Does not bruise/bleed easily. Psychiatric/Behavioral: Negative for suicidal ideas. The patient is not nervous/anxious. Generalized tiredness. Physical Exam  Constitutional:       General: He is not in acute distress. Appearance: He is well-developed. HENT:      Head: Atraumatic. Eyes:      Conjunctiva/sclera: Conjunctivae normal.   Neck:      Musculoskeletal: Neck supple. Thyroid: No thyromegaly. Cardiovascular:      Rate and Rhythm: Normal rate and regular rhythm. Heart sounds: Normal heart sounds. No murmur. Pulmonary:      Effort: Pulmonary effort is normal.      Breath sounds: Normal breath sounds. No wheezing. Abdominal:      Palpations: Abdomen is soft. Tenderness: There is no abdominal tenderness. There is no guarding. Skin:     General: Skin is warm and dry. Neurological:      Mental Status: He is alert and oriented to person, place, and time. Deep Tendon Reflexes: Reflexes are normal and symmetric. Psychiatric:         Behavior: Behavior normal.         Thought Content: Thought content normal.         Judgment: Judgment normal.       Vitals:    11/09/20 0859   BP: 132/88   Pulse: 67   Temp: 97.7 °F (36.5 °C)   SpO2: 97%         ASSESSMENT AND PLAN      Diagnosis Orders   1. Hypertension, unspecified type   - Stable       Recommend patient stop metoprolol at this time. No other intervention is recommended as patient appears to be clinically stable and highly functional.    Recommend patient follow up with me on an as needed basis.     Electronicallysigned by Pat Laird DO on 11/11/2020 at 10:14 AM

## 2020-11-11 ASSESSMENT — ENCOUNTER SYMPTOMS
SHORTNESS OF BREATH: 0
COUGH: 0
NAUSEA: 0
BACK PAIN: 0
VOMITING: 0
CONSTIPATION: 0
SORE THROAT: 0
EYE PAIN: 0

## 2020-12-30 ENCOUNTER — OFFICE VISIT (OUTPATIENT)
Dept: BARIATRICS/WEIGHT MGMT | Age: 40
End: 2020-12-30
Payer: COMMERCIAL

## 2020-12-30 VITALS
HEART RATE: 74 BPM | SYSTOLIC BLOOD PRESSURE: 110 MMHG | DIASTOLIC BLOOD PRESSURE: 70 MMHG | RESPIRATION RATE: 20 BRPM | HEIGHT: 72 IN | WEIGHT: 242 LBS | BODY MASS INDEX: 32.78 KG/M2

## 2020-12-30 DIAGNOSIS — R10.13 EPIGASTRIC PAIN: Primary | ICD-10-CM

## 2020-12-30 PROCEDURE — 99212 OFFICE O/P EST SF 10 MIN: CPT | Performed by: SURGERY

## 2021-01-06 ENCOUNTER — TELEPHONE (OUTPATIENT)
Dept: BARIATRICS/WEIGHT MGMT | Age: 41
End: 2021-01-06

## 2021-01-06 NOTE — TELEPHONE ENCOUNTER
Sylvia from [address] [place] [address] nuclear med dept called to ask about gastric emptying study - scheduled for tomorrow. The pt told scheduling that he could not take 2 days off work (needed for liquid and solids)  Nuclear med would like to know if  1. They can do only liquid or solid (and if so which)  2.  Some other suggestion    Call back number is [phone]

## 2021-01-07 ENCOUNTER — HOSPITAL ENCOUNTER (OUTPATIENT)
Dept: NUCLEAR MEDICINE | Age: 41
Discharge: HOME | End: 2021-01-09
Payer: COMMERCIAL

## 2021-01-07 VITALS — HEIGHT: 72 IN | WEIGHT: 230 LBS | BODY MASS INDEX: 31.15 KG/M2

## 2021-01-07 DIAGNOSIS — R10.13 EPIGASTRIC PAIN: ICD-10-CM

## 2021-01-07 PROCEDURE — 3430000000 HC RX DIAGNOSTIC RADIOPHARMACEUTICAL: Performed by: SURGERY

## 2021-01-07 PROCEDURE — A9541 TC99M SULFUR COLLOID: HCPCS | Performed by: SURGERY

## 2021-01-07 RX ADMIN — Medication 0.99 MILLICURIE: at 09:29

## 2021-01-10 NOTE — PROGRESS NOTES
[facility]  [facility] INVASIVE BARIATRIC SURG  [address] [address]  Dept: [phone]    ROBOTIC & MINIMALLY INVASIVE SURGERY  PROGRESS NOTE EVALUATION     Patient: Taran Guevara        Service Date: 12/30/2020     HPI:     Chief Complaint   Patient presents with    Gastroesophageal Reflux     6 month follow up       The patient is a pleasant 38y.o. year old male with epigastric pain, who stands Height: 6' (182.9 cm) tall with a weight of Weight: 242 lb (109.8 kg) , resulting in a BMI of Body mass index is 32.82 kg/m². He states he has been having symptoms for the past few months, and this is recurring. He complains of left upper quadrant pain and nausea. He does not associate this with meals or particular foods. He does believe he is having reflux. He denies diarrhea, constipation, melena or hematochezia. Ultrasound showed a polyp    He completed EGD which was negative. He feels his pain is improved.   He returns for further evaluation    Medical History:  Past Medical History:   Diagnosis Date    GERD (gastroesophageal reflux disease)     H/O cardiovascular stress test 08/2020    SMALL INFERIOR ISCHEMIA AREA    History of shingles 07/2020    Hypertension     Palpitations        Surgical History:  Past Surgical History:   Procedure Laterality Date    CARDIAC CATHETERIZATION  09/21/2020    NML CORS / NML LV FUNCTION  /  DR Renee Grace    COLONOSCOPY N/A 9/25/2020    COLONOSCOPY DIAGNOSTIC performed by Nolan Simpson MD at [place] [address] N/A 6/26/2020    EGD BIOPSY performed by Nolan [facility] MD at [facility]       Family History:      Problem Relation Age of Onset    Cancer Mother     Colon Cancer Mother     High Blood Pressure Father     Stroke Father        Social History:   Social History     Tobacco Use    Smoking status: Never Smoker    Smokeless tobacco: Never Used   Substance Use Topics    Alcohol use: No    Drug use: No       Current Med List:  Current Outpatient Medications   Medication Sig Dispense Refill    famotidine (PEPCID) 20 MG tablet Take 1 tablet by mouth daily as needed (Breakthrough heartburns) 90 tablet 3    losartan-hydrochlorothiazide (HYZAAR) 100-25 MG per tablet Take 1 tablet by mouth daily 90 tablet 0     No current facility-administered medications for this visit. Allergies   Allergen Reactions    Ace Inhibitors      cough       SOCIAL:      This patient is alone for the evaluation today.     REVIEW OF SYSTEMS: (Negative unless marked otherwise)     General  Negative for: [] Weight Change   [x] Fatigue   [x] Fevers & Chills    [] Appetite change [] Other:    Positive for: [] Weight Change   [] Fatigue   [] Fevers & Chills    [] Appetite change [] Other:   Cardiac  Negative for: [x] Chest Pain   [] Difficulty Breathing   [] Leg Cramps [x] Edema of Lower Extremeties    [] Left   [] Right      Positive for: [] Chest Pain   [] Difficulty Breathing   [] Leg Cramps [] Edema of Lower Extremeties    [] Left   [] Right   Pulmonary  Negative for: [x] Shortness of Breath [] Wheeze [x] Cough  [] Calf Pain     Positive for: [] Shortness of Breath [] Wheeze [] Cough  [] Calf Pain   Gastro-Intestinal Negative for: [] Heartburn   [] Reflux   [] Dysphagia   [] Melena   [] BRBPR  [x] Vomiting   [] Abdominal Pain   [] Diarrhea  [] Hernia    [] Constipation  [] Other:     Positive for: [] Heartburn   [] Reflux   [] Dysphagia   [] Melena   [] BRBPR  [] Vomiting   [x] Abdominal Pain   [] Diarrhea  [] Hernia    [] Constipation  [] Other:    Muskuloskeletal Negative for: [x] Joint pain   [] Back pain   [] Knee Pain   [] Muscle weakness   [x] Edema [] Other:     Positive for: [] Joint pain   [] Back pain   [] Knee Pain   [] Muscle weakness  [] Edema [] Other:    Neurologic Negative for: [x] Syncope   [x] Insomnia   [x] Being treated for depression  [] Other:     Positive for: [] Syncope   [] Insomnia   [] Being treated for depression  [] Other:    Skin Negative for: [x] Wound:   [] Open   [] Draining   [] Incisional     [x] Rash   [] Hair Loss  [] Other:     Positive for: [] Wound:   [] Open   [] Draining    [] Incisional  [] Rash   [] Hair Loss  [] Other:      PHYSICAL EXAMINATION:      /70 (Site: Left Upper Arm, Position: Sitting, Cuff Size: Large Adult)   Pulse 74   Resp 20   Ht 6' (1.829 m)   Wt 242 lb (109.8 kg)   BMI 32.82 kg/m²   Constitutional:  Vital signs are normal. The patient appears well-developed and well-nourished. HEENT:   Head: Normocephalic. Atraumatic  Eyes: pupils are equal and reactive. No scleral icterus is present. Neck: No mass and no thyromegaly present. Cardiovascular: Normal rate, regular rhythm, S1 normal and S2 normal.  Radial pulses present   Pulmonary/Chest: Effort normal and breath sounds normal. No retractions  Abdominal: Soft. Normal appearance. There is no organomegaly. No tenderness. There is no rigidity, no rebound, no guarding and no Mixon's sign. Musculoskeletal:        Right lower leg: Normal. No tenderness and no edema. Left lower leg: Normal. No tenderness and no edema. Neurological: The patient is alert and oriented. Moving all 4 extremities, sensation grossly intact bilateral  Skin: Skin is warm, dry and intact. Psychiatric: The patient has a normal mood and affect. Speech is normal and behavior is normal. Judgment and thought content normal. Cognition and memory are normal.       ASSESSMENT/PLAN:       Diagnosis Orders   1.  Epigastric pain  NM GASTRIC EMPTYING            EGD was negative for significant pathology  Check gastric emptying study as pain is on left  We discussed cholecystectomy, this was offered to patient  He would like to have EGD with BRAVO to see if this is really GERD he is experiencing  He does not want surgery at this point, if testing is negative he will consider cholecystectomy  Follow up after testing        Electronically signed by Faviola Lee, DO on 1/10/2021 at 1:07 PM

## 2021-01-13 ENCOUNTER — HOSPITAL ENCOUNTER (OUTPATIENT)
Dept: NUCLEAR MEDICINE | Age: 41
Discharge: HOME | End: 2021-01-15
Payer: COMMERCIAL

## 2021-01-13 DIAGNOSIS — R10.13 EPIGASTRIC PAIN: ICD-10-CM

## 2021-01-13 PROCEDURE — 78264 GASTRIC EMPTYING IMG STUDY: CPT

## 2021-01-13 PROCEDURE — 3430000000 HC RX DIAGNOSTIC RADIOPHARMACEUTICAL: Performed by: SURGERY

## 2021-01-13 PROCEDURE — A9541 TC99M SULFUR COLLOID: HCPCS | Performed by: SURGERY

## 2021-01-13 RX ADMIN — Medication 0.85 MILLICURIE: at 08:46

## 2021-01-22 ENCOUNTER — HOSPITAL ENCOUNTER (OUTPATIENT)
Dept: LAB | Age: 41
Setting detail: SPECIMEN
Discharge: HOME | End: 2021-01-22
Payer: COMMERCIAL

## 2021-01-22 DIAGNOSIS — Z01.818 PREOP TESTING: Primary | ICD-10-CM

## 2021-01-22 PROCEDURE — U0003 INFECTIOUS AGENT DETECTION BY NUCLEIC ACID (DNA OR RNA); SEVERE ACUTE RESPIRATORY SYNDROME CORONAVIRUS 2 (SARS-COV-2) (CORONAVIRUS DISEASE [COVID-19]), AMPLIFIED PROBE TECHNIQUE, MAKING USE OF HIGH THROUGHPUT TECHNOLOGIES AS DESCRIBED BY CMS-2020-01-R: HCPCS

## 2021-01-22 PROCEDURE — U0005 INFEC AGEN DETEC AMPLI PROBE: HCPCS

## 2021-01-24 LAB
SARS-COV-2, RAPID: NORMAL
SARS-COV-2: NORMAL
SARS-COV-2: NOT DETECTED
SOURCE: NORMAL

## 2021-01-25 ENCOUNTER — TELEPHONE (OUTPATIENT)
Dept: PRIMARY CARE CLINIC | Age: 41
End: 2021-01-25

## 2021-01-26 ENCOUNTER — ANESTHESIA EVENT (OUTPATIENT)
Dept: ENDOSCOPY | Age: 41
End: 2021-01-26
Payer: COMMERCIAL

## 2021-01-26 ENCOUNTER — HOSPITAL ENCOUNTER (OUTPATIENT)
Age: 41
Setting detail: OUTPATIENT SURGERY
Discharge: HOME | End: 2021-01-26
Attending: SURGERY | Admitting: SURGERY
Payer: COMMERCIAL

## 2021-01-26 ENCOUNTER — ANESTHESIA (OUTPATIENT)
Dept: ENDOSCOPY | Age: 41
End: 2021-01-26
Payer: COMMERCIAL

## 2021-01-26 VITALS
OXYGEN SATURATION: 96 % | DIASTOLIC BLOOD PRESSURE: 85 MMHG | SYSTOLIC BLOOD PRESSURE: 120 MMHG | RESPIRATION RATE: 25 BRPM

## 2021-01-26 VITALS
WEIGHT: 230 LBS | DIASTOLIC BLOOD PRESSURE: 89 MMHG | TEMPERATURE: 98.7 F | HEIGHT: 72 IN | BODY MASS INDEX: 31.15 KG/M2 | HEART RATE: 72 BPM | OXYGEN SATURATION: 97 % | SYSTOLIC BLOOD PRESSURE: 113 MMHG | RESPIRATION RATE: 20 BRPM

## 2021-01-26 PROCEDURE — 43239 EGD BIOPSY SINGLE/MULTIPLE: CPT | Performed by: SURGERY

## 2021-01-26 PROCEDURE — 3609012400 HC EGD TRANSORAL BIOPSY SINGLE/MULTIPLE: Performed by: SURGERY

## 2021-01-26 PROCEDURE — 6360000002 HC RX W HCPCS: Performed by: SPECIALIST

## 2021-01-26 PROCEDURE — 3609019000 HC EGD CAPSULE ENDOSCOPY: Performed by: SURGERY

## 2021-01-26 PROCEDURE — 2580000003 HC RX 258: Performed by: SURGERY

## 2021-01-26 PROCEDURE — 3700000000 HC ANESTHESIA ATTENDED CARE: Performed by: SURGERY

## 2021-01-26 PROCEDURE — 2500000003 HC RX 250 WO HCPCS: Performed by: SPECIALIST

## 2021-01-26 PROCEDURE — 7100000011 HC PHASE II RECOVERY - ADDTL 15 MIN: Performed by: SURGERY

## 2021-01-26 PROCEDURE — 2709999900 HC NON-CHARGEABLE SUPPLY: Performed by: SURGERY

## 2021-01-26 PROCEDURE — 2580000003 HC RX 258: Performed by: SPECIALIST

## 2021-01-26 PROCEDURE — 7100000010 HC PHASE II RECOVERY - FIRST 15 MIN: Performed by: SURGERY

## 2021-01-26 PROCEDURE — 2780000010 HC IMPLANT OTHER: Performed by: SURGERY

## 2021-01-26 PROCEDURE — 88305 TISSUE EXAM BY PATHOLOGIST: CPT

## 2021-01-26 RX ORDER — LIDOCAINE HYDROCHLORIDE 10 MG/ML
INJECTION, SOLUTION EPIDURAL; INFILTRATION; INTRACAUDAL; PERINEURAL PRN
Status: DISCONTINUED | OUTPATIENT
Start: 2021-01-26 | End: 2021-01-26 | Stop reason: SDUPTHER

## 2021-01-26 RX ORDER — SODIUM CHLORIDE 9 MG/ML
INJECTION, SOLUTION INTRAVENOUS CONTINUOUS PRN
Status: DISCONTINUED | OUTPATIENT
Start: 2021-01-26 | End: 2021-01-26 | Stop reason: SDUPTHER

## 2021-01-26 RX ORDER — FENTANYL CITRATE 50 UG/ML
INJECTION, SOLUTION INTRAMUSCULAR; INTRAVENOUS PRN
Status: DISCONTINUED | OUTPATIENT
Start: 2021-01-26 | End: 2021-01-26 | Stop reason: SDUPTHER

## 2021-01-26 RX ORDER — PROPOFOL 10 MG/ML
INJECTION, EMULSION INTRAVENOUS PRN
Status: DISCONTINUED | OUTPATIENT
Start: 2021-01-26 | End: 2021-01-26 | Stop reason: SDUPTHER

## 2021-01-26 RX ORDER — SODIUM CHLORIDE 9 MG/ML
INJECTION, SOLUTION INTRAVENOUS ONCE
Status: COMPLETED | OUTPATIENT
Start: 2021-01-26 | End: 2021-01-26

## 2021-01-26 RX ADMIN — SODIUM CHLORIDE: 9 INJECTION, SOLUTION INTRAVENOUS at 09:58

## 2021-01-26 RX ADMIN — SODIUM CHLORIDE: 9 INJECTION, SOLUTION INTRAVENOUS at 10:50

## 2021-01-26 RX ADMIN — PROPOFOL 40 MG: 10 INJECTION, EMULSION INTRAVENOUS at 11:47

## 2021-01-26 RX ADMIN — PROPOFOL 40 MG: 10 INJECTION, EMULSION INTRAVENOUS at 11:50

## 2021-01-26 RX ADMIN — FENTANYL CITRATE 50 MCG: 50 INJECTION, SOLUTION INTRAMUSCULAR; INTRAVENOUS at 11:41

## 2021-01-26 RX ADMIN — PROPOFOL 120 MG: 10 INJECTION, EMULSION INTRAVENOUS at 11:43

## 2021-01-26 RX ADMIN — LIDOCAINE HYDROCHLORIDE 50 MG: 10 INJECTION, SOLUTION EPIDURAL; INFILTRATION; INTRACAUDAL; PERINEURAL at 11:43

## 2021-01-26 ASSESSMENT — PAIN SCALES - GENERAL
PAINLEVEL_OUTOF10: 0

## 2021-01-26 NOTE — ANESTHESIA PRE PROCEDURE
Department of Anesthesiology  Preprocedure Note       Name:  Ted Lee   Age:  42 y.o.  :  1980                                          MRN:  [id number]         Date:  2021      Surgeon: Cal Fuller):  Jimena Luna DO    Procedure: Procedure(s):  50 hr BRAVO EGD ESOPHAGOGASTRODUODENOSCOPY    Medications prior to admission:   Prior to Admission medications    Medication Sig Start Date End Date Taking? Authorizing Provider   famotidine (PEPCID) 20 MG tablet Take 1 tablet by mouth daily as needed (Breakthrough heartburns) 20  Yes KEENA Arredondo NP   losartan-hydrochlorothiazide [facility]) 100-25 MG per tablet Take 1 tablet by mouth daily 20  Yes KEENA Kim CNP       Current medications:    No current facility-administered medications for this encounter. Allergies: Allergies   Allergen Reactions    Ace Inhibitors      cough       Problem List:    Patient Active Problem List   Diagnosis Code    Essential hypertension I10    Abdominal pain, right upper quadrant R10.11    Family history of colon cancer Z80.0    Gastroesophageal reflux disease without esophagitis K21.9       Past Medical History:        Diagnosis Date    GERD (gastroesophageal reflux disease)     H/O cardiovascular stress test 2020    SMALL INFERIOR ISCHEMIA AREA    History of shingles 2020    Hypertension     Palpitations        Past Surgical History:        Procedure Laterality Date    CARDIAC CATHETERIZATION  2020    NML CORS / NML LV FUNCTION  /  DR Prater Friend    COLONOSCOPY N/A 2020    COLONOSCOPY DIAGNOSTIC performed by Carina May MD at [place] [address] N/A 2020    EGD BIOPSY performed by Carina May MD at [address] [place] [address] History:    Social History     Tobacco Use    Smoking status: Never Smoker    Smokeless tobacco: Never Used   Substance Use Topics    Alcohol use:  No Counseling given: Not Answered      Vital Signs (Current):   Vitals:    01/26/21 0951   BP: (!) 131/94   Pulse: 77   Resp: 17   Temp: 37.1 °C (98.7 °F)   TempSrc: Temporal   SpO2: 95%   Weight: 230 lb (104.3 kg)   Height: 6' (1.829 m)                                              BP Readings from Last 3 Encounters:   01/26/21 (!) 131/94   12/30/20 110/70   11/09/20 132/88       NPO Status: Time of last liquid consumption: 0730(sip with meds)                        Time of last solid consumption: 1900                        Date of last liquid consumption: 01/26/21                        Date of last solid food consumption: 01/25/21    BMI:   Wt Readings from Last 3 Encounters:   01/26/21 230 lb (104.3 kg)   01/07/21 230 lb (104.3 kg)   12/30/20 242 lb (109.8 kg)     Body mass index is 31.19 kg/m². CBC:   Lab Results   Component Value Date    WBC 8.8 05/22/2020    RBC 4.69 05/22/2020    RBC 5.06 12/22/2011    HGB 14.2 05/22/2020    HCT 42.4 05/22/2020    MCV 90 05/22/2020    RDW 13.6 08/27/2013     09/21/2020     12/22/2011       CMP:   Lab Results   Component Value Date     10/06/2020    K 4.0 10/06/2020     10/06/2020    CO2 25 10/06/2020    BUN 18 10/06/2020    CREATININE 0.85 10/06/2020    GFRAA >60 10/06/2020    LABGLOM >60 10/06/2020    GLUCOSE 101 10/06/2020    GLUCOSE 108 12/22/2011    PROT 7.2 10/06/2020    CALCIUM 9.3 10/06/2020    BILITOT 0.46 10/06/2020    ALKPHOS 90 10/06/2020    AST 18 10/06/2020    ALT 22 10/06/2020       POC Tests: No results for input(s): POCGLU, POCNA, POCK, POCCL, POCBUN, POCHEMO, POCHCT in the last 72 hours.     Coags: No results found for: PROTIME, INR, APTT    HCG (If Applicable): No results found for: PREGTESTUR, PREGSERUM, HCG, HCGQUANT     ABGs: No results found for: PHART, PO2ART, SHN7NCF, GIC6FGV, BEART, D8ZRQLQF     Type & Screen (If Applicable):  No results found for: Jose Black Drug/Infectious Status (If Applicable):  No results found for: HIV, HEPCAB    COVID-19 Screening (If Applicable):   Lab Results   Component Value Date    COVID19 Not Detected 01/22/2021    COVID19 Not Detected 09/21/2020    COVID19 Not Detected 06/22/2020         Anesthesia Evaluation  Patient summary reviewed and Nursing notes reviewed  Airway: Mallampati: II  TM distance: >3 FB   Neck ROM: full  Mouth opening: > = 3 FB Dental:          Pulmonary:Negative Pulmonary ROS and normal exam                               Cardiovascular:    (+) hypertension:,                   Neuro/Psych:               GI/Hepatic/Renal:   (+) GERD:,           Endo/Other: Negative Endo/Other ROS                    Abdominal:           Vascular: negative vascular ROS. Anesthesia Plan      MAC     ASA 2       Induction: intravenous. Anesthetic plan and risks discussed with patient. Plan discussed with CRNA and attending.     Attending anesthesiologist reviewed and agrees with Pre Eval content              KEENA Vo - CRNA   1/26/2021

## 2021-01-26 NOTE — ANESTHESIA POSTPROCEDURE EVALUATION
Department of Anesthesiology  Postprocedure Note    Patient: Nelson Ramires  MRN: [id number]  YOB: 1980  Date of evaluation: 1/26/2021  Time:  3:12 PM     Procedure Summary     Date: 01/26/21 Room / Location: [facility] 03 / [facility]    Anesthesia Start: 1139 Anesthesia Stop: 6762    Procedures:       ESOPHAGEAL CAPSULE ENDOSCOPY (N/A )      EGD BIOPSY Diagnosis: (EPIGASTIC PAIN, GERD)    Surgeons: Antonio Paul DO Responsible Provider: Melanie Mora MD    Anesthesia Type: MAC ASA Status: 2          Anesthesia Type: MAC    Pako Phase I: Pako Score: 10    Pako Phase II: Pako Score: 10    Last vitals: Reviewed and per EMR flowsheets.        Anesthesia Post Evaluation    Patient location during evaluation: PACU  Patient participation: complete - patient participated  Level of consciousness: awake and alert  Pain score: 0  Airway patency: patent  Nausea & Vomiting: no nausea and no vomiting  Complications: no  Cardiovascular status: hemodynamically stable  Respiratory status: room air  Hydration status: euvolemic

## 2021-01-26 NOTE — H&P
Talks on phone: Not on file     Gets together: Not on file     Attends Gnosticism service: Not on file     Active member of club or organization: Not on file     Attends meetings of clubs or organizations: Not on file     Relationship status: Not on file    Intimate partner violence     Fear of current or ex partner: Not on file     Emotionally abused: Not on file     Physically abused: Not on file     Forced sexual activity: Not on file   Other Topics Concern    Not on file   Social History Narrative    Not on file       No current facility-administered medications for this encounter. Objective      Physical Exam  BP (!) 131/94   Pulse 77   Temp 98.7 °F (37.1 °C) (Temporal)   Resp 17   Ht 6' (1.829 m)   Wt 230 lb (104.3 kg)   SpO2 95%   BMI 31.19 kg/m²    Constitutional:  Vital signs are normal. The patient appears well-developed and well-nourished. HEENT:   Head: Normocephalic. Atraumatic  Eyes: pupils are equal and reactive. No scleral icterus is present. Neck: No mass and no thyromegaly present. Cardiovascular: Normal rate, regular rhythm, S1 normal and S2 normal.    Pulmonary/Chest: Effort normal and breath sounds normal.   Abdominal: Soft. Normal appearance. There is no organomegaly. No tenderness. There is no rigidity, no rebound, no guarding and no Mixon's sign. Musculoskeletal:        Right lower leg: Normal. No tenderness and no edema. Left lower leg: Normal. No tenderness and no edema. Lymphadenopathy:     No cervical adenopathy, No Exrtemity Adenopathy. Neurological: The patient is alert and oriented. Skin: Skin is warm, dry and intact. Psychiatric: The patient has a normal mood and affect.  Speech is normal and behavior is normal. Judgment and thought content normal. Cognition and memory are normal.     Assessment     Patient Active Problem List   Diagnosis    Essential hypertension    Abdominal pain, right upper quadrant    Family history of colon cancer  Gastroesophageal reflux disease without esophagitis     GERD, now worse off PPI    Plan   EGD with BRAVO

## 2021-01-26 NOTE — OP NOTE
Operative Note      Patient: Dexter Juan  YOB: 1980  MRN: [id number]    Date of Procedure: 1/26/2021    Pre-Op Diagnosis: EPIGASTIC PAIN, GERD despite PPI and H2 blocker    Post-Op Diagnosis: Same       Procedure(s):  ESOPHAGEAL CAPSULE ENDOSCOPY  EGD BIOPSY WITH BIOPSY    Surgeon(s):  Fanny Garcia DO    Assistant:   First Assistant: Laurina Severance, RN; Lorrie Rodarte RN    Anesthesia: Monitor Anesthesia Care    Estimated Blood Loss (mL): Minimal    Complications: None    Specimens:   ID Type Source Tests Collected by Time Destination   A :  Tissue Stomach SURGICAL PATHOLOGY Fanny Garcia DO 1/26/2021 1146        Implants:  * No implants in log *      Drains: * No LDAs found *    Findings: No hiatal hernia, no esophagitis    Detailed Description of Procedure:     Operative Narrative: The risks and benefits were explained in detail to the patient who agreed and consented to the procedure. The patient was taken to the endoscopic suite and placed in a lateral position. Oxygen was administered via nasal cannula and a mouth guard was placed.   MAC was administered via the anesthetic team. The endoscope was then advanced into the oropharynx and down into the esophagus under direct visualization. The scope was further advanced through the esophagus, GE junction and stomach to the pylorus under visualization. The scope was passed through the pylorus and duodenal sweep performed, advancing and visualizing to the second portion of the duodenum. The scope was then withdrawn to the antrum and cold forceps were used to take biopsies of the antrum for H. Pylori. Appropriate hemostasis was noted. The scope was then retroflexed to visualize the GE junction. Evidence of a hiatal hernia was not noted. The scope was then slowly withdrawn through the GE junction. The Z line was noted. The stomach was then decompressed. The scope was withdrawn from the esophagus and no further lesions noted. The BRAVO catheter was advanced down into the esophagus to 6 cm above the GE junction. The catheter confirmed in the esophagus with endoscopy. Catheter then deployed per manufacturers instructions. Catheter again confirmed in the esophagus without trauma. The scope was withdrawn. The patient tolerated the procedure well. He will follow up with the [facility] for further assessment.       Electronically signed by Los Seaman DO on 1/26/2021 at 11:53 AM

## 2021-01-26 NOTE — PROGRESS NOTES
Discharge instructions given to patient and wife. Asked appropriate questions, voiced understanding.

## 2021-01-27 LAB — SURGICAL PATHOLOGY REPORT: NORMAL

## 2021-01-29 ENCOUNTER — TELEPHONE (OUTPATIENT)
Dept: BARIATRICS/WEIGHT MGMT | Age: 41
End: 2021-01-29

## 2021-01-29 NOTE — TELEPHONE ENCOUNTER
Patient will schedule an appointment after bravo results have been received ( those results can take up to 2 weeks)

## 2021-01-29 NOTE — TELEPHONE ENCOUNTER
Pt had EGD 1/26/21and was told to follow up in one week; however he just turned in his bravo yesterday and did not want to schedule if those results were not ready; please advise when he should schedule with Abelardo Archer

## 2021-02-05 ENCOUNTER — NURSE ONLY (OUTPATIENT)
Dept: BARIATRICS/WEIGHT MGMT | Age: 41
End: 2021-02-05

## 2021-02-05 DIAGNOSIS — K21.9 GASTROESOPHAGEAL REFLUX DISEASE WITHOUT ESOPHAGITIS: Primary | ICD-10-CM

## 2021-02-08 NOTE — TELEPHONE ENCOUNTER
Patient called to follow up if he needs to schedule a follow up from [facility]. I see a LAB visit was put in for Friday 2/5. But, does he need another follow up . Advised patient he will get a call back from our office in the next 24 hrs

## 2021-02-14 NOTE — PROGRESS NOTES
BRAVO Interpretation:    Duration: 48 hours    Acid Exposure: 1 hour 41 minutes, 3.7%  Number of refluxes: 61  Number of long refluxes: 2    Demeester score 18.3  Day 1: 23.5  Day 2: 7.3    Assessment:  GERD  Epigastric pain appears related to GERD

## 2021-02-24 ENCOUNTER — OFFICE VISIT (OUTPATIENT)
Dept: BARIATRICS/WEIGHT MGMT | Age: 41
End: 2021-02-24
Payer: COMMERCIAL

## 2021-02-24 VITALS
SYSTOLIC BLOOD PRESSURE: 108 MMHG | BODY MASS INDEX: 32.78 KG/M2 | HEART RATE: 76 BPM | DIASTOLIC BLOOD PRESSURE: 76 MMHG | WEIGHT: 242 LBS | TEMPERATURE: 98.1 F | HEIGHT: 72 IN

## 2021-02-24 DIAGNOSIS — K21.9 GASTROESOPHAGEAL REFLUX DISEASE WITHOUT ESOPHAGITIS: Primary | ICD-10-CM

## 2021-02-24 PROCEDURE — 99212 OFFICE O/P EST SF 10 MIN: CPT | Performed by: SURGERY

## 2021-03-07 NOTE — PROGRESS NOTES
[facility]  [facility]  [address] [place] [address] [address] [id number]  Dept: [phone]    ROBOTIC & MINIMALLY INVASIVE SURGERY  PROGRESS NOTE EVALUATION     Patient: Jay Vela        Service Date: 2/24/2021     HPI:     Chief Complaint   Patient presents with    Follow-up       The patient is a pleasant 42y.o. year old male with epigastric pain, who stands Height: 6' (182.9 cm) tall with a weight of Weight: 242 lb (109.8 kg) , resulting in a BMI of Body mass index is 32.82 kg/m². He states he has been having symptoms for the past few months, and this is recurring. He complains of left upper quadrant pain and nausea. He does not associate this with meals or particular foods. He does believe he is having reflux. He denies diarrhea, constipation, melena or hematochezia. Ultrasound showed a polyp. He completed EGD which was negative. He feels his pain is improved. He returns for further evaluation. Gastric emptying study and BRAVO completed. BRAVO did show evidence of GERD.      Medical History:  Past Medical History:   Diagnosis Date    GERD (gastroesophageal reflux disease)     H/O cardiovascular stress test 08/2020    SMALL INFERIOR ISCHEMIA AREA    History of shingles 07/2020    Hypertension     Palpitations        Surgical History:  Past Surgical History:   Procedure Laterality Date    CAPSULE ENDOSCOPY N/A 1/26/2021    ESOPHAGEAL CAPSULE ENDOSCOPY performed by Danielle Aden DO at [address] [place] [address]  09/21/2020    NML CORS / NML LV FUNCTION  /  DR Mikayla Aquino COLONOSCOPY N/A 9/25/2020    COLONOSCOPY DIAGNOSTIC performed by Elba Han MD at [address] [place] [address] 6/26/2020    EGD BIOPSY performed by Elba Han MD at [address] [place] [address]  1/26/2021    EGD BIOPSY performed by Danielle Aden DO at [facility]       Family History:      Problem [] Edema [] Other:    Neurologic Negative for: [x] Syncope   [] Insomnia   [x] Being treated for depression  [] Other:     Positive for: [] Syncope   [] Insomnia   [] Being treated for depression  [] Other:    Skin Negative for: [] Wound:   [] Open   [] Draining   [] Incisional     [x] Rash   [x] Hair Loss  [] Other:     Positive for: [] Wound:   [] Open   [] Draining    [] Incisional  [] Rash   [] Hair Loss  [] Other:      PHYSICAL EXAMINATION:      /76   Pulse 76   Temp 98.1 °F (36.7 °C)   Ht 6' (1.829 m)   Wt 242 lb (109.8 kg)   BMI 32.82 kg/m²   Constitutional:  Vital signs are normal. The patient appears well-developed and well-nourished. HEENT:   Head: Normocephalic. Atraumatic  Eyes: pupils are equal and reactive. No scleral icterus is present. Neck: No mass and no thyromegaly present. Cardiovascular: Normal rate, regular rhythm, S1 normal and S2 normal.  Radial pulses present   Pulmonary/Chest: Effort normal and breath sounds normal. No retractions  Abdominal: Soft. Normal appearance. There is no organomegaly. No tenderness. There is no rigidity, no rebound, no guarding and no Mixon's sign. Musculoskeletal:        Right lower leg: Normal. No tenderness and no edema. Left lower leg: Normal. No tenderness and no edema. Neurological: The patient is alert and oriented. Moving all 4 extremities, sensation grossly intact bilateral  Skin: Skin is warm, dry and intact. Psychiatric: The patient has a normal mood and affect. Speech is normal and behavior is normal. Judgment and thought content normal. Cognition and memory are normal.       ASSESSMENT/PLAN:       Diagnosis Orders   1. Gastroesophageal reflux disease without esophagitis              EGD was negative for significant pathology  Check gastric emptying negative, reviewed with patient  We discussed cholecystectomy, this was offered to patient, he declined today.   Discussed the polyp seen on ultrasound  BRAVO reviewed with

## 2021-04-06 RX ORDER — LOSARTAN POTASSIUM AND HYDROCHLOROTHIAZIDE 25; 100 MG/1; MG/1
1 TABLET ORAL DAILY
Qty: 90 TABLET | Refills: 0 | Status: CANCELLED | OUTPATIENT
Start: 2021-04-06

## 2021-04-08 NOTE — TELEPHONE ENCOUNTER
Patient is not on this medication at this time. Please contact him and ask him if he would set up an office visit with my [facility] office this week to have his blood pressure checked and verify whether or not he needs to have the medication renewed.

## 2021-05-10 ENCOUNTER — OFFICE VISIT (OUTPATIENT)
Dept: FAMILY MEDICINE CLINIC | Age: 41
End: 2021-05-10
Payer: COMMERCIAL

## 2021-05-10 VITALS
BODY MASS INDEX: 32.91 KG/M2 | WEIGHT: 243 LBS | SYSTOLIC BLOOD PRESSURE: 129 MMHG | HEART RATE: 80 BPM | TEMPERATURE: 97 F | HEIGHT: 72 IN | DIASTOLIC BLOOD PRESSURE: 90 MMHG

## 2021-05-10 DIAGNOSIS — I10 ESSENTIAL HYPERTENSION: ICD-10-CM

## 2021-05-10 DIAGNOSIS — J30.2 SEASONAL ALLERGIES: Primary | ICD-10-CM

## 2021-05-10 PROCEDURE — 99213 OFFICE O/P EST LOW 20 MIN: CPT | Performed by: FAMILY MEDICINE

## 2021-05-10 RX ORDER — FEXOFENADINE HCL 180 MG/1
180 TABLET ORAL DAILY
Qty: 90 TABLET | Refills: 1 | Status: SHIPPED | OUTPATIENT
Start: 2021-05-10 | End: 2021-10-06

## 2021-05-10 RX ORDER — LOSARTAN POTASSIUM AND HYDROCHLOROTHIAZIDE 25; 100 MG/1; MG/1
1 TABLET ORAL DAILY
Qty: 90 TABLET | Refills: 3 | Status: SHIPPED | OUTPATIENT
Start: 2021-05-10 | End: 2022-06-27 | Stop reason: SDUPTHER

## 2021-05-10 ASSESSMENT — ENCOUNTER SYMPTOMS
VOMITING: 0
SHORTNESS OF BREATH: 0
BACK PAIN: 0
CONSTIPATION: 0
SORE THROAT: 0
EYE PAIN: 0
COUGH: 0
NAUSEA: 0

## 2021-05-10 NOTE — PROGRESS NOTES
Blood Pressure Check (BP check )    Bedelia Carrel is a 25-year-old  male who presents to the office today for blood pressure evaluation. He states he was seen by cardiology in the last year and ultimately underwent heart catheterization. Fortunately, he states his heart cath was negative. This was originally based upon cardiac complaints with some irregularity to his heart rhythm. He was evaluated and cleared. At this time he states he does continue to monitor blood pressure at home. He sees generally his systolic readings between 125 and 130 mmHg and his diastolic reading hovering around 90 mmHg. Refill-blood pressure medication today  Allergies-notes that seasonal allergies appear to be flared for him at this time. He is noting it associated with blooming of trees and flowering of plants. He would like a refill of common allergy medication by prescription at this time. BP (!) 129/90 (Site: Left Upper Arm)   Pulse 80   Temp 97 °F (36.1 °C)   Ht 6' (1.829 m)   Wt 243 lb (110.2 kg)   BMI 32.96 kg/m²       Review of Systems   Constitutional: Negative for chills and fever. HENT: Negative for sore throat. Eyes: Negative for pain. Respiratory: Negative for cough and shortness of breath. Cardiovascular: Negative for chest pain, palpitations and leg swelling. Gastrointestinal: Negative for constipation, nausea and vomiting. Genitourinary: Negative for dysuria. Musculoskeletal: Negative for back pain and myalgias. Skin: Negative for rash. Neurological: Negative for dizziness and weakness. Hematological: Does not bruise/bleed easily. Psychiatric/Behavioral: Negative for suicidal ideas. The patient is not nervous/anxious. Physical Exam  Constitutional:       General: He is not in acute distress. Appearance: He is well-developed. HENT:      Head: Atraumatic. Eyes:      Conjunctiva/sclera: Conjunctivae normal.   Neck:      Musculoskeletal: Neck supple. Thyroid: No thyromegaly. Cardiovascular:      Rate and Rhythm: Normal rate and regular rhythm. Heart sounds: Normal heart sounds. No murmur. Pulmonary:      Effort: Pulmonary effort is normal.      Breath sounds: Normal breath sounds. No wheezing. Abdominal:      Palpations: Abdomen is soft. Tenderness: There is no abdominal tenderness. There is no guarding. Skin:     General: Skin is warm and dry. Neurological:      Mental Status: He is alert and oriented to person, place, and time. Deep Tendon Reflexes: Reflexes are normal and symmetric. Psychiatric:         Behavior: Behavior normal.         Thought Content: Thought content normal.         Judgment: Judgment normal.           ASSESSMENT AND PLAN      Diagnosis Orders   1. Seasonal allergies treatment-Allegra-180 mg 1 daily. fexofenadine (ALLEGRA) 180 MG tablet   2. Essential hypertension  -treatment-renew losartan hydrochlorothiazide for 1 year. Covid vaccination has been completed. Blood pressure reading today manually coincides with nursing evaluation prior to my review. bp 124/90    Recheck 6 months.       Electronicallysigned by Darryle Comas, DO on 5/10/2021 at 10:07 AM

## 2021-06-18 ENCOUNTER — HOSPITAL ENCOUNTER (EMERGENCY)
Age: 41
Discharge: HOME | End: 2021-06-18
Attending: EMERGENCY MEDICINE
Payer: COMMERCIAL

## 2021-06-18 VITALS
HEART RATE: 68 BPM | OXYGEN SATURATION: 97 % | DIASTOLIC BLOOD PRESSURE: 108 MMHG | RESPIRATION RATE: 18 BRPM | WEIGHT: 235 LBS | HEIGHT: 72 IN | BODY MASS INDEX: 31.83 KG/M2 | SYSTOLIC BLOOD PRESSURE: 156 MMHG | TEMPERATURE: 97.7 F

## 2021-06-18 DIAGNOSIS — S05.02XA LEFT CORNEAL ABRASION, INITIAL ENCOUNTER: Primary | ICD-10-CM

## 2021-06-18 PROCEDURE — 99285 EMERGENCY DEPT VISIT HI MDM: CPT

## 2021-06-18 PROCEDURE — 6370000000 HC RX 637 (ALT 250 FOR IP): Performed by: EMERGENCY MEDICINE

## 2021-06-18 RX ORDER — ERYTHROMYCIN 5 MG/G
OINTMENT OPHTHALMIC
Qty: 3.5 G | Refills: 0 | Status: SHIPPED | OUTPATIENT
Start: 2021-06-18 | End: 2021-06-28

## 2021-06-18 RX ORDER — ERYTHROMYCIN 5 MG/G
OINTMENT OPHTHALMIC ONCE
Status: COMPLETED | OUTPATIENT
Start: 2021-06-18 | End: 2021-06-18

## 2021-06-18 RX ADMIN — ERYTHROMYCIN: 5 OINTMENT OPHTHALMIC at 21:45

## 2021-06-18 ASSESSMENT — ENCOUNTER SYMPTOMS
PHOTOPHOBIA: 1
EYE REDNESS: 1
EYE PAIN: 1

## 2021-06-18 ASSESSMENT — VISUAL ACUITY: OU: 1

## 2021-06-18 ASSESSMENT — PAIN DESCRIPTION - ORIENTATION: ORIENTATION: LEFT

## 2021-06-18 ASSESSMENT — PAIN DESCRIPTION - LOCATION: LOCATION: EYE

## 2021-06-18 ASSESSMENT — PAIN SCALES - GENERAL: PAINLEVEL_OUTOF10: 7

## 2021-06-19 NOTE — ED PROVIDER NOTES
N/A 1/26/2021    ESOPHAGEAL CAPSULE ENDOSCOPY performed by Pearl Martinez DO at [address] [place] [address] [address]  09/21/2020    NML CORS / NML LV FUNCTION  /  DR Desirae Nolasco    COLONOSCOPY N/A 9/25/2020    COLONOSCOPY DIAGNOSTIC performed by Joseph Bullock MD at [place] [address] N/A 6/26/2020    EGD BIOPSY performed by Joseph Bullock MD at [place] [address]  1/26/2021    EGD BIOPSY performed by Pearl Martinez DO at [facility]         CURRENT MEDICATIONS     Previous Medications    FEXOFENADINE (ALLEGRA) 180 MG TABLET    Take 1 tablet by mouth daily    LOSARTAN-HYDROCHLOROTHIAZIDE (HYZAAR) 100-25 MG PER TABLET    Take 1 tablet by mouth daily       ALLERGIES     Ace inhibitors    FAMILY HISTORY       Family History   Problem Relation Age of Onset    Cancer Mother     Colon Cancer Mother     High Blood Pressure Father     Stroke Father           SOCIAL HISTORY       Social History     Socioeconomic History    Marital status:      Spouse name: None    Number of children: None    Years of education: None    Highest education level: None   Occupational History    None   Tobacco Use    Smoking status: Never Smoker    Smokeless tobacco: Never Used   Vaping Use    Vaping Use: Never used   Substance and Sexual Activity    Alcohol use: No    Drug use: No    Sexual activity: None   Other Topics Concern    None   Social History Narrative    None     Social Determinants of Health     Financial Resource Strain:     Difficulty of Paying Living Expenses:    Food Insecurity:     Worried About Running Out of Food in the Last Year:     Ran Out of Food in the Last Year:    Transportation Needs:     Lack of Transportation (Medical):      Lack of Transportation (Non-Medical):    Physical Activity:     Days of Exercise per Week:     Minutes of Exercise per Session:    Stress:     Feeling of Stress :    Social Connections:     Frequency of Communication with Friends and Family:     Frequency of Social Gatherings with Friends and Family:     Attends Gnosticism Services:     Active Member of Clubs or Organizations:     Attends Club or Organization Meetings:     Marital Status:    Intimate Partner Violence:     Fear of Current or Ex-Partner:     Emotionally Abused:     Physically Abused:     Sexually Abused:        SCREENINGS    Carlos Coma Scale  Eye Opening: Spontaneous  Best Verbal Response: Oriented  Best Motor Response: Obeys commands  [place] Coma Scale Score: 15        PHYSICAL EXAM    (up to 7 for level 4, 8 or more for level 5)     ED Triage Vitals [06/18/21 2124]   BP Temp Temp Source Pulse Resp SpO2 Height Weight   (!) 156/108 97.7 °F (36.5 °C) Oral 68 18 97 % 6' (1.829 m) 235 lb (106.6 kg)       Physical Exam  Vitals and nursing note reviewed. Constitutional:       General: He is not in acute distress. Appearance: Normal appearance. He is not ill-appearing or toxic-appearing. HENT:      Head: Normocephalic and atraumatic. Nose: Nose normal. No congestion. Mouth/Throat:      Mouth: Mucous membranes are moist.   Eyes:      General: Lids are normal. Lids are everted, no foreign bodies appreciated. Vision grossly intact. Right eye: No foreign body or discharge. Left eye: Discharge present. No foreign body. Extraocular Movements: Extraocular movements intact. Conjunctiva/sclera:      Right eye: Right conjunctiva is not injected. No hemorrhage. Left eye: Left conjunctiva is injected. No hemorrhage. Pupils: Pupils are equal, round, and reactive to light. Right eye: Pupil is reactive. Left eye: Pupil is reactive. Corneal abrasion and fluorescein uptake present. Sidney exam negative. Cardiovascular:      Rate and Rhythm: Normal rate and regular rhythm. Pulses: Normal pulses.    Pulmonary:      Effort: Pulmonary effort is normal. No respiratory demonstrates understanding. PROCEDURES:  Unless otherwise noted below, none     Procedures    FINAL IMPRESSION      1.  Left corneal abrasion, initial encounter          DISPOSITION/PLAN   DISPOSITION Decision To Discharge 06/18/2021 09:39:06 PM      PATIENT REFERRED TO:  [place] [phone]  In 2 days        DISCHARGE MEDICATIONS:  New Prescriptions    ERYTHROMYCIN (ROMYCIN) 5 MG/GM OPHTHALMIC OINTMENT    Place in left eye three times daily for 7 days          (Please note that portions of this note were completed with a voice recognition program.  Efforts were made to edit the dictations but occasionally words are mis-transcribed.)    Estrada Wright DO (electronically signed)  Board Certified Emergency Physician          Estrada Wright DO  06/18/21 5515

## 2021-07-23 ENCOUNTER — HOSPITAL ENCOUNTER (OUTPATIENT)
Age: 41
Discharge: HOME | End: 2021-07-23
Payer: COMMERCIAL

## 2021-07-23 ENCOUNTER — OFFICE VISIT (OUTPATIENT)
Dept: FAMILY MEDICINE CLINIC | Age: 41
End: 2021-07-23
Payer: COMMERCIAL

## 2021-07-23 ENCOUNTER — HOSPITAL ENCOUNTER (OUTPATIENT)
Age: 41
Setting detail: SPECIMEN
Discharge: HOME | End: 2021-07-23
Payer: COMMERCIAL

## 2021-07-23 VITALS
HEIGHT: 72 IN | SYSTOLIC BLOOD PRESSURE: 134 MMHG | DIASTOLIC BLOOD PRESSURE: 78 MMHG | OXYGEN SATURATION: 97 % | WEIGHT: 249 LBS | BODY MASS INDEX: 33.72 KG/M2 | HEART RATE: 92 BPM

## 2021-07-23 DIAGNOSIS — R30.0 DYSURIA: ICD-10-CM

## 2021-07-23 DIAGNOSIS — R39.11 URINARY HESITANCY: ICD-10-CM

## 2021-07-23 DIAGNOSIS — R39.11 URINARY HESITANCY: Primary | ICD-10-CM

## 2021-07-23 LAB
-: ABNORMAL
AMORPHOUS: ABNORMAL
BACTERIA: ABNORMAL
BILIRUBIN URINE: NEGATIVE
BILIRUBIN, POC: NORMAL
BLOOD URINE, POC: NORMAL
CASTS UA: ABNORMAL /LPF (ref 0–8)
CLARITY, POC: NORMAL
COLOR, POC: NORMAL
COLOR: YELLOW
CRYSTALS, UA: ABNORMAL /HPF
EPITHELIAL CELLS UA: ABNORMAL /HPF (ref 0–5)
GLUCOSE URINE, POC: NORMAL
GLUCOSE URINE: NEGATIVE
KETONES, POC: NORMAL
KETONES, URINE: NEGATIVE
LEUKOCYTE EST, POC: NORMAL
LEUKOCYTE ESTERASE, URINE: NEGATIVE
MUCUS: ABNORMAL
NITRITE, POC: NORMAL
NITRITE, URINE: NEGATIVE
OTHER OBSERVATIONS UA: ABNORMAL
PH UA: 6.5 (ref 5–8)
PH, POC: 6.5
PROSTATE SPECIFIC ANTIGEN: 0.62 UG/L
PROTEIN UA: NEGATIVE
PROTEIN, POC: NORMAL
RBC UA: ABNORMAL /HPF (ref 0–4)
RENAL EPITHELIAL, UA: ABNORMAL /HPF
SPECIFIC GRAVITY UA: 1.02 (ref 1–1.03)
SPECIFIC GRAVITY, POC: 1.03
TRICHOMONAS: ABNORMAL
TURBIDITY: CLEAR
URINE HGB: ABNORMAL
UROBILINOGEN, POC: 0.2
UROBILINOGEN, URINE: NORMAL
WBC UA: ABNORMAL /HPF (ref 0–5)
YEAST: ABNORMAL

## 2021-07-23 PROCEDURE — G0103 PSA SCREENING: HCPCS

## 2021-07-23 PROCEDURE — 99213 OFFICE O/P EST LOW 20 MIN: CPT | Performed by: FAMILY MEDICINE

## 2021-07-23 PROCEDURE — 36415 COLL VENOUS BLD VENIPUNCTURE: CPT

## 2021-07-23 PROCEDURE — 81002 URINALYSIS NONAUTO W/O SCOPE: CPT | Performed by: FAMILY MEDICINE

## 2021-07-23 RX ORDER — MOXIFLOXACIN 5 MG/ML
SOLUTION/ DROPS OPHTHALMIC
Status: ON HOLD | COMMUNITY
Start: 2021-07-08 | End: 2021-08-19

## 2021-07-23 RX ORDER — PREDNISOLONE ACETATE 10 MG/ML
SUSPENSION/ DROPS OPHTHALMIC
COMMUNITY
Start: 2021-06-19 | End: 2021-08-06 | Stop reason: ALTCHOICE

## 2021-07-23 RX ORDER — DOXYCYCLINE 50 MG/1
TABLET ORAL
Status: ON HOLD | COMMUNITY
Start: 2021-07-08 | End: 2021-08-19

## 2021-07-23 RX ORDER — CIPROFLOXACIN HYDROCHLORIDE 3.5 MG/ML
SOLUTION/ DROPS TOPICAL
COMMUNITY
Start: 2021-06-19 | End: 2021-08-06 | Stop reason: ALTCHOICE

## 2021-07-23 SDOH — ECONOMIC STABILITY: FOOD INSECURITY: WITHIN THE PAST 12 MONTHS, YOU WORRIED THAT YOUR FOOD WOULD RUN OUT BEFORE YOU GOT MONEY TO BUY MORE.: NEVER TRUE

## 2021-07-23 SDOH — ECONOMIC STABILITY: FOOD INSECURITY: WITHIN THE PAST 12 MONTHS, THE FOOD YOU BOUGHT JUST DIDN'T LAST AND YOU DIDN'T HAVE MONEY TO GET MORE.: NEVER TRUE

## 2021-07-23 ASSESSMENT — PATIENT HEALTH QUESTIONNAIRE - PHQ9
2. FEELING DOWN, DEPRESSED OR HOPELESS: 0
SUM OF ALL RESPONSES TO PHQ9 QUESTIONS 1 & 2: 0
SUM OF ALL RESPONSES TO PHQ QUESTIONS 1-9: 0
1. LITTLE INTEREST OR PLEASURE IN DOING THINGS: 0

## 2021-07-23 ASSESSMENT — SOCIAL DETERMINANTS OF HEALTH (SDOH): HOW HARD IS IT FOR YOU TO PAY FOR THE VERY BASICS LIKE FOOD, HOUSING, MEDICAL CARE, AND HEATING?: NOT HARD AT ALL

## 2021-07-23 NOTE — PROGRESS NOTES
Other (Prostate issue, ), Urinary Frequency (Kidney issue? ), and Eye Pain (Steroid, ATB for eye injury before Fathers day. )    Had plastic tubing scratch cornea - dr Cat Mercedes, saw [facility]  Treating with associated eye care    Urinary symptoms - noted dysuria, MIGHT HAVE HAD URINARY STONES    Wife is fine - has frequent UTI's       /78   Pulse 92   Ht 6' (1.829 m)   Wt 249 lb (112.9 kg)   SpO2 97%   BMI 33.77 kg/m²       Review of Systems   Constitutional: Negative for chills and fever. HENT: Negative for sore throat. Eyes: Negative for pain. Respiratory: Negative for cough and shortness of breath. Cardiovascular: Negative for chest pain, palpitations and leg swelling. Gastrointestinal: Negative for constipation, nausea and vomiting. Genitourinary: Negative for dysuria. Musculoskeletal: Negative for back pain and myalgias. Skin: Negative for rash. Neurological: Negative for dizziness and weakness. Hematological: Does not bruise/bleed easily. Psychiatric/Behavioral: Negative for suicidal ideas. The patient is not nervous/anxious. Physical Exam  Vitals reviewed. Cardiovascular:      Rate and Rhythm: Normal rate and regular rhythm. Pulmonary:      Effort: Pulmonary effort is normal.      Breath sounds: Normal breath sounds. hrrr  lctab    ASSESSMENT AND PLAN      Diagnosis Orders   1. Urinary hesitancy  Urinalysis With Microscopic    PSA Screening    Taran Noriega MD, Urology, [place]    Culture, Urine    POCT Urinalysis no Micro   2.  Dysuria  Urinalysis With Microscopic    PSA Screening    Taran Noriega MD, Urology, [place]    Culture, Urine    POCT Urinalysis no Micro     See orders    Electronicallysigned by Jinny Dillard,  on 7/27/2021 at 8:22 AM

## 2021-07-24 LAB
CULTURE: NO GROWTH
Lab: NORMAL
SPECIMEN DESCRIPTION: NORMAL

## 2021-07-27 ASSESSMENT — ENCOUNTER SYMPTOMS
SHORTNESS OF BREATH: 0
EYE PAIN: 0
SORE THROAT: 0
BACK PAIN: 0
COUGH: 0
NAUSEA: 0
VOMITING: 0
CONSTIPATION: 0

## 2021-08-06 ENCOUNTER — HOSPITAL ENCOUNTER (OUTPATIENT)
Dept: PREADMISSION TESTING | Age: 41
Discharge: HOME | End: 2021-08-10
Payer: COMMERCIAL

## 2021-08-06 VITALS
OXYGEN SATURATION: 96 % | HEIGHT: 72 IN | DIASTOLIC BLOOD PRESSURE: 76 MMHG | TEMPERATURE: 97.9 F | SYSTOLIC BLOOD PRESSURE: 133 MMHG | HEART RATE: 83 BPM | BODY MASS INDEX: 33.2 KG/M2 | RESPIRATION RATE: 16 BRPM | WEIGHT: 245.1 LBS

## 2021-08-06 LAB
ANION GAP SERPL CALCULATED.3IONS-SCNC: 11 MMOL/L (ref 9–17)
BUN BLDV-MCNC: 19 MG/DL (ref 6–20)
CHLORIDE BLD-SCNC: 104 MMOL/L (ref 98–107)
CO2: 26 MMOL/L (ref 20–31)
CREAT SERPL-MCNC: 1.01 MG/DL (ref 0.7–1.2)
GFR AFRICAN AMERICAN: >60 ML/MIN
GFR NON-AFRICAN AMERICAN: >60 ML/MIN
GFR SERPL CREATININE-BSD FRML MDRD: NORMAL ML/MIN/{1.73_M2}
GFR SERPL CREATININE-BSD FRML MDRD: NORMAL ML/MIN/{1.73_M2}
HCT VFR BLD CALC: 47.5 % (ref 40.7–50.3)
HEMOGLOBIN: 15.2 G/DL (ref 13–17)
MCH RBC QN AUTO: 29.5 PG (ref 25.2–33.5)
MCHC RBC AUTO-ENTMCNC: 32 G/DL (ref 28.4–34.8)
MCV RBC AUTO: 92.2 FL (ref 82.6–102.9)
NRBC AUTOMATED: 0 PER 100 WBC
PDW BLD-RTO: 11.9 % (ref 11.8–14.4)
PLATELET # BLD: 298 K/UL (ref 138–453)
PMV BLD AUTO: 9.8 FL (ref 8.1–13.5)
POTASSIUM SERPL-SCNC: 3.9 MMOL/L (ref 3.7–5.3)
RBC # BLD: 5.15 M/UL (ref 4.21–5.77)
SODIUM BLD-SCNC: 141 MMOL/L (ref 135–144)
WBC # BLD: 8.3 K/UL (ref 3.5–11.3)

## 2021-08-06 PROCEDURE — 82565 ASSAY OF CREATININE: CPT

## 2021-08-06 PROCEDURE — 93005 ELECTROCARDIOGRAM TRACING: CPT | Performed by: ANESTHESIOLOGY

## 2021-08-06 PROCEDURE — 80051 ELECTROLYTE PANEL: CPT

## 2021-08-06 PROCEDURE — 36415 COLL VENOUS BLD VENIPUNCTURE: CPT

## 2021-08-06 PROCEDURE — 84520 ASSAY OF UREA NITROGEN: CPT

## 2021-08-06 PROCEDURE — 85027 COMPLETE CBC AUTOMATED: CPT

## 2021-08-06 RX ORDER — FAMOTIDINE 20 MG/1
20 TABLET, FILM COATED ORAL NIGHTLY PRN
COMMUNITY
End: 2022-02-09 | Stop reason: ALTCHOICE

## 2021-08-06 ASSESSMENT — PAIN DESCRIPTION - PAIN TYPE: TYPE: CHRONIC PAIN

## 2021-08-06 ASSESSMENT — PAIN SCALES - GENERAL: PAINLEVEL_OUTOF10: 3

## 2021-08-06 ASSESSMENT — PAIN DESCRIPTION - DESCRIPTORS: DESCRIPTORS: ACHING;NUMBNESS;TIGHTNESS

## 2021-08-06 ASSESSMENT — PAIN DESCRIPTION - LOCATION: LOCATION: SHOULDER

## 2021-08-06 ASSESSMENT — PAIN DESCRIPTION - ORIENTATION: ORIENTATION: RIGHT

## 2021-08-06 NOTE — PRE-PROCEDURE INSTRUCTIONS
[facility] Thursday, [facility]21 at 10:30 AM    Once you enter the hospital lobby, take the elevators to the second floor. Check-In is at the surgery registration desk. Continue to take your home medications as you normally do up to and including the night before surgery with the exception of any blood thinning medications. Please stop any blood thinning medications as directed by your surgeon or prescribing physician. Failure to stop certain medications may interfere with your scheduled surgery. These may include:  Aspirin, Warfarin (Coumadin), Clopidogrel (Plavix), Ibuprofen (Motrin, Advil), Naproxen (Aleve), Meloxicam (Mobic), Celecoxib (Celebrex), Eliquis, Pradaxa, Xarelto, Effient, Fish Oil, Herbal supplements. May take Tylenol for pain. Please take the following medication(s) the day of surgery with a small sip of water:  Allegra, Pepcid    Please use your inhalers at home the day of surgery. PREPARING FOR YOUR SURGERY:     Before surgery, you can play an important role in your own health. Because skin is not sterile, we need to be sure that your skin is as free of germs as possible before surgery by carefully washing before surgery. Preparing or prepping skin before surgery can reduce the risk of a surgical site infection.   Do not shave the area of your body where your surgery will be performed unless you received specific permission from your physician. You will need to shower at home the night before surgery and the morning of surgery with a special soap called chlorhexidine gluconate (CHG*). *Not to be used by people allergic to Chlorhexidine Gluconate (CHG). Following these instructions will help you be sure that your skin is clean before surgery. Instructions on cleaning your skin before surgery: The night before your surgery:      You will need to shower with warm water (not hot) and the CHG soap.        Use a clean wash cloth and a clean towel. Have clean clothes available to put on after the shower.    First wash your hair with regular shampoo. Rinse your hair and body thoroughly to remove the shampoo.  Wash your face and genital area (private parts) with your regular soap or water only. Thoroughly rinse your body with warm water from the neck down.  Turn water off to prevent rinsing the soap off too soon.  With a clean wet washcloth and half of the CHG soap in the bottle, lather your entire body from the neck down. Do not use CHG soap near your eyes or ears to avoid injury to those areas.  Wash thoroughly, paying special attention to the area where your surgery will be performed.  Wash your body gently for five (5) minutes. Avoid scrubbing your skin too hard.  Turn the water back on and rinse your body thoroughly.  Pat yourself dry with a clean, soft towel. Do not apply lotion, cream or powder.  Dress with clean freshly washed clothes. The morning of surgery:     Repeat shower following steps above - using remaining half of CHG soap in bottle. Patient Instructions:    [facility] If you are having any type of anesthesia you are to have nothing to eat or drink after midnight the night before your surgery. This includes gum, hard candy, mints, water or smoking or chewing tobacco.  The only exception to this is a small sip of water to take with any morning dose of heart, blood pressure, or seizure medications. No alcoholic beverages for 24 hours prior to surgery.  Brush your teeth but do not swallow water.  Bring your eyeglasses and case with you. No contacts are to be worn the day of surgery. You also may bring your hearing aids.  If you are on C-PAP or Bi-PAP at home and plan on staying in the hospital overnight for your surgery please bring the machine with you. · Do not wear any jewelry or body piercings day of surgery. Also, NO lotion, perfume or deodorant to be used the day of surgery. No nail polish on the operative extremity (arm/leg surgeries)    ·  If you are staying overnight with us, please bring a small bag of personal items.  Please wear loose, comfortable clothing. If you are potentially going to have a cast or brace bring clothing that will fit over them.  In case of illness - If you have cold or flu like symptoms (high fever, runny nose, sore throat, cough, etc.) rash, nausea, vomiting, loose stools, and/or recent contact with someone who has a contagious disease (chicken pox, measles, etc.) Please call your doctor before coming to the hospital.     If your child is having surgery please make arrangements for any other children to be cared for at home on the day of surgery. Other children are not permitted in recovery room and we want you to be able to spend time with the patient. If other arrangements are not available then we suggest that you have a second adult to stay in the waiting room. Day of Surgery/Procedure:    As a patient at [facility] you can expect quality medical and nursing care that is centered on your individual needs. Our goal is to make your surgical experience as comfortable as possible    . Transportation After Your Surgery/Procedure: You will need a friend or family member to drive you home after your procedure. Your  must be 25years of age or older and able to sign off on your discharge instructions. A taxi cab or any other form of public transportation is not acceptable. Your friend or family member must stay at the hospital throughout your procedure. Someone must remain with you for the first 24 hours after your surgery if you receive anesthesia or medication. If you do not have someone to stay with you, your procedure may be cancelled.       If you have any other questions regarding your procedure or the day of surgery, please call [phone]      _________________________  ____________________________  Signature (Patient)              Signature (Provider) & date

## 2021-08-06 NOTE — H&P
History and Physical Service   [facility]    HISTORY AND PHYSICAL EXAMINATION            Date of Evaluation: 8/6/2021  Patient name:  Yulissa Rubi  MRN:   [id number]  YOB: 1980  PCP:    Alexi Willoughby DO    History Obtained From:     Patient, medical records    History of Present Illness: This is Yulissa Rubi a 42 y.o. male who presents for a pre-admission testing appointment for an upcoming right shoulder arthroscopy with distal clavicle excision by Dr. Cata Thompson scheduled on 8/19/2021 at 1230 due to osteoarthritis right AC joint and impingement syndrome right shoulder. The patient's chief complaint is 3-7/10 right shoulder pain that has progressively worsened over the past 2 years. Pain is a dull, achy pain. Patient states his right arm is tense at all times. Right shoulder pain is aggravated by sleeping, driving. Prior treatment includes injections and physical therapy. Denies recent falls and injuries. Functional Capacity per pt:   1) Pt is able to walk 2 city blocks on level ground without SOB. 2) Pt is able to climb 2 flights of stairs without SOB. 3) Pt is able to walk up a hill for 1-2 city blocks without SOB.     Past Medical History:     Past Medical History:   Diagnosis Date    GERD (gastroesophageal reflux disease)     H/O cardiovascular stress test 08/2020    SMALL INFERIOR ISCHEMIA AREA    History of shingles 07/2020    Hypertension     Palpitations         Past Surgical History:     Past Surgical History:   Procedure Laterality Date    CAPSULE ENDOSCOPY N/A 1/26/2021    ESOPHAGEAL CAPSULE ENDOSCOPY performed by Dhaval Rice DO at [facility]    CARDIAC CATHETERIZATION  09/21/2020    NML CORS / NML LV FUNCTION  /  DR Mariya Rodriguez COLONOSCOPY N/A 9/25/2020    COLONOSCOPY DIAGNOSTIC performed by Laura Pacheco MD at [address] [place] [address] 6/26/2020    EGD BIOPSY performed by Laura Pacheco MD at [facility]    UPPER GASTROINTESTINAL ENDOSCOPY  1/26/2021    EGD BIOPSY performed by Zachariah Parmar DO at [facility] Endoscopy    WISDOM TOOTH EXTRACTION Bilateral         Medications Prior to Admission:     Prior to Admission medications    Medication Sig Start Date End Date Taking? Authorizing Provider   famotidine (PEPCID) 20 MG tablet Take 20 mg by mouth nightly as needed   Yes Historical Provider, MD   doxycycline (ADOXA) 50 MG tablet  7/8/21   Historical Provider, MD   moxifloxacin (VIGAMOX) 0.5 % ophthalmic solution  7/8/21   Historical Provider, MD   losartan-hydroCHLOROthiazide (HYZAAR) 100-25 MG per tablet Take 1 tablet by mouth daily 5/10/21   Hayde NicktingDO richelle   fexofenadine (ALLEGRA) 180 MG tablet Take 1 tablet by mouth daily 5/10/21   Hayde Patiño DO        Allergies:     Ace inhibitors    Social History:     Tobacco:    reports that he has never smoked. He has never used smokeless tobacco.  Alcohol:      reports no history of alcohol use. Drug Use:  reports no history of drug use. Family History:     Family History   Problem Relation Age of Onset    Cancer Mother     Colon Cancer Mother     High Blood Pressure Father     Stroke Father        Review of Systems:     Positive and Negative as described in HPI. CONSTITUTIONAL:  Negative for fevers, chills, sweats, fatigue, and weight loss. HEENT: Corneal abrasion left eye - following with ophthamology - has healing lenses on (last evaluated 8/5/2021). Negative for glasses, hearing changes, rhinorrhea, and throat pain. RESPIRATORY:  Negative for shortness of breath, cough, congestion, and wheezing. CARDIOVASCULAR: HTN, palpitations (very rare). Previous stress test and heart cath 9/21/2020 (negative). Negative for chest pain, blood clot, irregular heartbeat. GASTROINTESTINAL: GERD - takes pepcid at night (after being put on steroids for shoulder pain). Occasional nausea with heartburn in the morning.  Negative for vomiting, Testing:  Recent Results (from the past 24 hour(s))   CBC    Collection Time: 08/06/21  9:40 AM   Result Value Ref Range    WBC 8.3 3.5 - 11.3 k/uL    RBC 5.15 4.21 - 5.77 m/uL    Hemoglobin 15.2 13.0 - 17.0 g/dL    Hematocrit 47.5 40.7 - 50.3 %    MCV 92.2 82.6 - 102.9 fL    MCH 29.5 25.2 - 33.5 pg    MCHC 32.0 28.4 - 34.8 g/dL    RDW 11.9 11.8 - 14.4 %    Platelets 268 569 - 396 k/uL    MPV 9.8 8.1 - 13.5 fL    NRBC Automated 0.0 0.0 per 100 WBC       Recent Labs     08/06/21  0940   HGB 15.2   HCT 47.5   WBC 8.3   MCV 92.2       No results for input(s): COVID19 in the last 720 hours. Imaging/Diagnostics:    Cardiac Catheterization  Order: [id number]  Status:  Final result   Visible to patient:  No (not released) Next appt:  09/02/2021 at 09:20 AM in Urology Radha Angulo MD)   0 Result Notes     Narrative & Impression    Cardiac Diagnostic Report      Demographics      Patient   Marian Jacobson       Date of Study       09/21/2020   Name      Date of   1980            Gender              Male   Birth      Age       38 year(s)            Race                      Room      [id number]^JULI^DELVIN  Height:             74 inch, 187.96 cm   Number      Corporate [id number]              Weight:             233 pounds, 105.7 kg   ID #      Patient   [id number]             BSA:      2.32 m^2  BMI:      29.92 kg/m^2   Acct #      MR #      [de-identified]               Performing          Delvin Broderick                                   Physician      Accession [id number]            Referring   #                               Physician      Case ID # [id number]                 Assisting                                   Physician     Additional Comments  H&P reviewed and patient examined by performing physician prior to the  procedure on 9/21/20 at  No changes noted. If changes, see note below.   Mallampati Classification 2 / ASA Classification II : per Physician .     Procedure  Procedure Type:      Diagnostic procedure: Lt Heart, Coronary Angio, LVgram     Complications:    - No complication     Indications:    - Atypical angina       - Controlled HTN       - Coronary risk factors       - Abnormal nuclear perfusion test       - Arrhythmia      Conclusions      Procedure Summary      Normal coronary arteries   Normal LV function      Recommendations      Medical treatments   Add BB for his PAC's and palpitation, then replace his ARB      Signature      ----------------------------------------------------------------   Electronically signed by Marie Broderick(Performing Physician) on   09/21/2020 09:20   ----------------------------------------------------------------      Angiographic Findings      Cardiac Arteries and Lesion Findings     LMCA: Normal 0% stenosis.     LAD: Normal 0% stenosis.     LCx: Normal 0% stenosis.     RCA: Normal 0% stenosis. Small, non dominant      Coronary Tree      Dominance: Right     LV Analysis  LV function assessed as:Normal.  Ejection Fraction  +----------------------------------------------------------------------+---+  ! Method                                                                ! EF%! +----------------------------------------------------------------------+---+  ! LV gram                                                               !55 !  +----------------------------------------------------------------------+---+      LV Segment Contractility      1 - Normal    3 - Mild         5 - Severe       7 - Dyskinesis   hypokinesis      hypokinesis      2 -           4 - Moderate     6 - Akinesis     8 - Aneurysm   Hypokinesis   hypokinesis     Procedure Data  Procedure Start Time: 09/21/2020 09:06. Procedure End Time: 09/21/2020  09:16.     The procedure was explained in detail to the patient. Risks, complications  and alternative treatments were reviewed.  Written consent was obtained.     Diagnostic Cath Status: Elective     Entry Locations    - Retrograde Percutaneous access was performed through the Right Radial      artery. A 6 Fr sheath was inserted. Hemostasis was successfully obtained      using TR Band.     Procedure Medications:    - Fentanyl I.V. 50 mcg.       - Versed I.V. 2 mg.       - Lidocaine HCl 1% 10mg/ml S.Q. 5 ml.       - Heparin I.A. 2000 units.       - Verapamil I.A. 2.5 mg.       - Nitroglycerin I.A. 400 mcg.     Catheters and Wires:    - 6F Catheter JR 4 was used for Left ventriculography.       - 6F Catheter JR 4 was used for Right coronary angiography.       - 6F Catheter JL 3.5 was used for Left coronary angiography.     Contrast Material:    - Optiray 66032 ml     Fluoroscopy Time: Diagnostic: 3:45 minutes. Total: 3:45 minutes.     Estimated Blood Loss: 5 ml.      Medical History     Allergies    - Ace Inhibitors.      Risk Factors      The patient risk factors include:obesity, treated hypercholesterolemia,   treated hypertension, last creatinine: 0.9 mg/dl, creatinine clearance:   161.47 ml/min and dyslipidemia. Admission Data  Admission Date: 09/21/2020     Admission Status: Outpatient.         -The patient's anginal syndrome was assessed as CCS I according to the      [facility] clinical classification.      Hemodynamics      Condition: Baseline Room Air      Estimated: 301. 20Heart Rate: 91 bpm     Pressure  +-----+--------------------------------------------------------------------+  ! Site ! Pressure                                                            ! +-----+--------------------------------------------------------------------+  ! AO   !125/35 (72)                                                         !  +-----+--------------------------------------------------------------------+  ! AO   !113/84 (98)                                                         !  +-----+--------------------------------------------------------------------+  ! LV   !123/0 ,6 !  +-----+--------------------------------------------------------------------+  ! LV   !125/3 ,9                                                            !  +-----+--------------------------------------------------------------------+     Valve Gradients and Areas  +-----------+---------+---------+---------+----------+---------+-----------+  ! Valve      ! Peak     ! Mean     ! Area     ! Index     ! Flow     ! Source     ! +-----------+---------+---------+---------+----------+---------+-----------+  ! Aortic     !0        !0        !         !          !         !           !  +-----------+---------+---------+---------+----------+---------+-----------+  ! Aortic     !0        !0        !         !          !         !           !  +-----------+---------+---------+---------+----------+---------+-----------+     Shunts      Oxygen Values      O2 [id number]. 12O2 [id number]         Specimen Collected: 20 09:06 Last Resulted: 20 09:20                  EK2021 See Epic. Diagnosis:      1. Osteoarthritis right AC joint  2. Impingement syndrome right shoulder    Plans:     1.  Right shoulder arthroscopy with distal clavicle excision      Wilton Carlisle, KEENA - CNP  2021  9:48 AM

## 2021-08-06 NOTE — H&P (VIEW-ONLY)
History and Physical Service   [facility] [place] - INPATIENT    HISTORY AND PHYSICAL EXAMINATION            Date of Evaluation: 8/6/2021  Patient name:  Jaquelin Connelly  MRN:   [id number]  YOB: 1980  PCP:    Alexandr Agudelo DO    History Obtained From:     Patient, medical records    History of Present Illness: This is Jaquelin Connelly a 42 y.o. male who presents for a pre-admission testing appointment for an upcoming right shoulder arthroscopy with distal clavicle excision by Dr. Anais Jacobs scheduled on 8/19/2021 at 1230 due to osteoarthritis right AC joint and impingement syndrome right shoulder. The patient's chief complaint is 3-7/10 right shoulder pain that has progressively worsened over the past 2 years. Pain is a dull, achy pain. Patient states his right arm is tense at all times. Right shoulder pain is aggravated by sleeping, driving. Prior treatment includes injections and physical therapy. Denies recent falls and injuries. Functional Capacity per pt:   1) Pt is able to walk 2 city blocks on level ground without SOB. 2) Pt is able to climb 2 flights of stairs without SOB. 3) Pt is able to walk up a hill for 1-2 city blocks without SOB.     Past Medical History:     Past Medical History:   Diagnosis Date    GERD (gastroesophageal reflux disease)     H/O cardiovascular stress test 08/2020    SMALL INFERIOR ISCHEMIA AREA    History of shingles 07/2020    Hypertension     Palpitations         Past Surgical History:     Past Surgical History:   Procedure Laterality Date    CAPSULE ENDOSCOPY N/A 1/26/2021    ESOPHAGEAL CAPSULE ENDOSCOPY performed by Maxwell cMleod DO at [facility] Endoscopy    CARDIAC CATHETERIZATION  09/21/2020    NML CORS / NML LV FUNCTION  /  DR Kevin Low COLONOSCOPY N/A 9/25/2020    COLONOSCOPY DIAGNOSTIC performed by Alyssa Padron MD at [address] [place] [address] 6/26/2020    EGD BIOPSY performed by Alyssa Padron MD at [facility]    UPPER GASTROINTESTINAL ENDOSCOPY  1/26/2021    EGD BIOPSY performed by Spencer David DO at [facility] Endoscopy    WISDOM TOOTH EXTRACTION Bilateral         Medications Prior to Admission:     Prior to Admission medications    Medication Sig Start Date End Date Taking? Authorizing Provider   famotidine (PEPCID) 20 MG tablet Take 20 mg by mouth nightly as needed   Yes Historical Provider, MD   doxycycline (ADOXA) 50 MG tablet  7/8/21   Historical Provider, MD   moxifloxacin (VIGAMOX) 0.5 % ophthalmic solution  7/8/21   Historical Provider, MD   losartan-hydroCHLOROthiazide (HYZAAR) 100-25 MG per tablet Take 1 tablet by mouth daily 5/10/21   Rufino Weston DO   fexofenadine (ALLEGRA) 180 MG tablet Take 1 tablet by mouth daily 5/10/21   Rufino Weston DO        Allergies:     Ace inhibitors    Social History:     Tobacco:    reports that he has never smoked. He has never used smokeless tobacco.  Alcohol:      reports no history of alcohol use. Drug Use:  reports no history of drug use. Family History:     Family History   Problem Relation Age of Onset    Cancer Mother     Colon Cancer Mother     High Blood Pressure Father     Stroke Father        Review of Systems:     Positive and Negative as described in HPI. CONSTITUTIONAL:  Negative for fevers, chills, sweats, fatigue, and weight loss. HEENT: Corneal abrasion left eye - following with ophthamology - has healing lenses on (last evaluated 8/5/2021). Negative for glasses, hearing changes, rhinorrhea, and throat pain. RESPIRATORY:  Negative for shortness of breath, cough, congestion, and wheezing. CARDIOVASCULAR: HTN, palpitations (very rare). Previous stress test and heart cath 9/21/2020 (negative). Negative for chest pain, blood clot, irregular heartbeat. GASTROINTESTINAL: GERD - takes pepcid at night (after being put on steroids for shoulder pain). Occasional nausea with heartburn in the morning.  Negative for vomiting, diarrhea, constipation, change in bowel habits, and abdominal pain. GENITOURINARY: Had previous difficulty with urination - has referral for urology and has not seen yet. Negative for difficulty of urination, burning with urination, and frequency. INTEGUMENT: Negative for rash, skin lesions, and easy bruising. HEMATOLOGIC/LYMPHATIC: Right shoulder swelling  ENDOCRINE:  Negative for increase in thirst, increase in urination, and heat or cold intolerance. MUSCULOSKELETAL: See HPI Negative joint pains, muscle aches, and swelling of joints. NEUROLOGICAL: Right shoulder numbness/tingling Negative for headaches, dizziness, lightheadedness  BEHAVIOR/PSYCH: Negative for depression and anxiety. Physical Exam:   /76   Pulse 83   Temp 97.9 °F (36.6 °C) (Temporal)   Resp 16   Ht 6' (1.829 m)   Wt 245 lb 1.6 oz (111.2 kg)   SpO2 96%   BMI 33.24 kg/m²    No results for input(s): POCGLU in the last 72 hours. General Appearance:  Alert, well appearing, and in no acute distress. Mental status:  Oriented to person, place, and time. Head:  Normocephalic and atraumatic. Eye:  No icterus, redness, pupils equal and reactive, extraocular eye movements intact, and conjunctiva clear. Ear:  Hearing grossly intact. Nose:  No drainage noted. Mouth:  Mucous membranes moist.  Neck:  Supple and no carotid bruits noted. Lungs:  Bilateral equal air entry, clear to auscultation, no wheezing, rales or rhonchi, and normal effort. Cardiovascular:  Normal rate, regular rhythm, no murmur, gallop, or rub. Abdomen:  Soft, nontender, nondistended, and active bowel sounds. Neurologic:  Normal speech and cranial nerves II through XII grossly intact. Strength 5/5 bilaterally. Skin:  No gross lesions, rashes, bruising, or bleeding on exposed skin area. Extremities:  Posterior tibial pulses 2+ bilaterally. No pedal edema. No calf tenderness with palpation. Psych:  Normal affect.      Investigations:      Laboratory Testing:  Recent Results (from the past 24 hour(s))   CBC    Collection Time: 08/06/21  9:40 AM   Result Value Ref Range    WBC 8.3 3.5 - 11.3 k/uL    RBC 5.15 4.21 - 5.77 m/uL    Hemoglobin 15.2 13.0 - 17.0 g/dL    Hematocrit 47.5 40.7 - 50.3 %    MCV 92.2 82.6 - 102.9 fL    MCH 29.5 25.2 - 33.5 pg    MCHC 32.0 28.4 - 34.8 g/dL    RDW 11.9 11.8 - 14.4 %    Platelets 417 243 - 363 k/uL    MPV 9.8 8.1 - 13.5 fL    NRBC Automated 0.0 0.0 per 100 WBC       Recent Labs     08/06/21  0940   HGB 15.2   HCT 47.5   WBC 8.3   MCV 92.2       No results for input(s): COVID19 in the last 720 hours. Imaging/Diagnostics:    Cardiac Catheterization  Order: [id number]  Status:  Final result   Visible to patient:  No (not released) Next appt:  09/02/2021 at 09:20 AM in Urology Alexi Ordoñez MD)   0 Result Notes     Narrative & Impression    Cardiac Diagnostic Report      Demographics      Patient   Virginia Mejia       Date of Study       09/21/2020   Name      Date of   1980            Gender              Male   Birth      Age       38 year(s)            Race                      Room      [id number]^JULI^DELVIN  Height:             74 inch, 187.96 cm   Number      Corporate [id number]              Weight:             233 pounds, 105.7 kg   ID #      Patient   [id number]             BSA:      2.32 m^2  BMI:      29.92 kg/m^2   Acct #      MR #      [de-identified]               Performing          Delvin Broderick                                   Physician      Accession [id number]            Referring   #                               Physician      Case ID # [id number]                 Assisting                                   Physician     Additional Comments  H&P reviewed and patient examined by performing physician prior to the  procedure on 9/21/20 at  No changes noted. If changes, see note below.   Mallampati Classification 2 / ASA Classification II : per Physician .     Procedure  Procedure Type:      Diagnostic procedure: Lt Heart, Coronary Angio, LVgram     Complications:    - No complication     Indications:    - Atypical angina       - Controlled HTN       - Coronary risk factors       - Abnormal nuclear perfusion test       - Arrhythmia      Conclusions      Procedure Summary      Normal coronary arteries   Normal LV function      Recommendations      Medical treatments   Add BB for his PAC's and palpitation, then replace his ARB      Signature      ----------------------------------------------------------------   Electronically signed by Marie Broderick(Performing Physician) on   09/21/2020 09:20   ----------------------------------------------------------------      Angiographic Findings      Cardiac Arteries and Lesion Findings     LMCA: Normal 0% stenosis.     LAD: Normal 0% stenosis.     LCx: Normal 0% stenosis.     RCA: Normal 0% stenosis. Small, non dominant      Coronary Tree      Dominance: Right     LV Analysis  LV function assessed as:Normal.  Ejection Fraction  +----------------------------------------------------------------------+---+  ! Method                                                                ! EF%! +----------------------------------------------------------------------+---+  ! LV gram                                                               !55 !  +----------------------------------------------------------------------+---+      LV Segment Contractility      1 - Normal    3 - Mild         5 - Severe       7 - Dyskinesis   hypokinesis      hypokinesis      2 -           4 - Moderate     6 - Akinesis     8 - Aneurysm   Hypokinesis   hypokinesis     Procedure Data  Procedure Start Time: 09/21/2020 09:06. Procedure End Time: 09/21/2020  09:16.     The procedure was explained in detail to the patient. Risks, complications  and alternative treatments were reviewed.  Written consent was obtained.     Diagnostic Cath Status: Elective     Entry Locations    - Retrograde Percutaneous access was performed through the Right Radial      artery. A 6 Fr sheath was inserted. Hemostasis was successfully obtained      using TR Band.     Procedure Medications:    - Fentanyl I.V. 50 mcg.       - Versed I.V. 2 mg.       - Lidocaine HCl 1% 10mg/ml S.Q. 5 ml.       - Heparin I.A. 2000 units.       - Verapamil I.A. 2.5 mg.       - Nitroglycerin I.A. 400 mcg.     Catheters and Wires:    - 6F Catheter JR 4 was used for Left ventriculography.       - 6F Catheter JR 4 was used for Right coronary angiography.       - 6F Catheter JL 3.5 was used for Left coronary angiography.     Contrast Material:    - Optiray 47242 ml     Fluoroscopy Time: Diagnostic: 3:45 minutes. Total: 3:45 minutes.     Estimated Blood Loss: 5 ml.      Medical History     Allergies    - Ace Inhibitors.      Risk Factors      The patient risk factors include:obesity, treated hypercholesterolemia,   treated hypertension, last creatinine: 0.9 mg/dl, creatinine clearance:   161.47 ml/min and dyslipidemia. Admission Data  Admission Date: 09/21/2020     Admission Status: Outpatient.         -The patient's anginal syndrome was assessed as CCS I according to the      [facility] clinical classification.      Hemodynamics      Condition: Baseline Room Air      Estimated: 301. 20Heart Rate: 91 bpm     Pressure  +-----+--------------------------------------------------------------------+  ! Site ! Pressure                                                            ! +-----+--------------------------------------------------------------------+  ! AO   !125/35 (72)                                                         !  +-----+--------------------------------------------------------------------+  ! AO   !113/84 (98)                                                         !  +-----+--------------------------------------------------------------------+  ! LV   !123/0 ,6 !  +-----+--------------------------------------------------------------------+  ! LV   !125/3 ,9                                                            !  +-----+--------------------------------------------------------------------+     Valve Gradients and Areas  +-----------+---------+---------+---------+----------+---------+-----------+  ! Valve      ! Peak     ! Mean     ! Area     ! Index     ! Flow     ! Source     ! +-----------+---------+---------+---------+----------+---------+-----------+  ! Aortic     !0        !0        !         !          !         !           !  +-----------+---------+---------+---------+----------+---------+-----------+  ! Aortic     !0        !0        !         !          !         !           !  +-----------+---------+---------+---------+----------+---------+-----------+     Shunts      Oxygen Values      O2 [id number]. 12O2 Ycrhyahmnls996.2         Specimen Collected: 20 09:06 Last Resulted: 20 09:20                  EK2021 See Epic. Diagnosis:      1. Osteoarthritis right AC joint  2. Impingement syndrome right shoulder    Plans:     1.  Right shoulder arthroscopy with distal clavicle excision      Wilton Carlisle, KEENA - CNP  2021  9:48 AM

## 2021-08-07 LAB
EKG ATRIAL RATE: 77 BPM
EKG P AXIS: 15 DEGREES
EKG P-R INTERVAL: 150 MS
EKG Q-T INTERVAL: 378 MS
EKG QRS DURATION: 92 MS
EKG QTC CALCULATION (BAZETT): 427 MS
EKG R AXIS: 9 DEGREES
EKG T AXIS: 3 DEGREES
EKG VENTRICULAR RATE: 77 BPM

## 2021-08-18 ENCOUNTER — ANESTHESIA EVENT (OUTPATIENT)
Dept: OPERATING ROOM | Age: 41
End: 2021-08-18
Payer: COMMERCIAL

## 2021-08-19 ENCOUNTER — HOSPITAL ENCOUNTER (OUTPATIENT)
Age: 41
Setting detail: OUTPATIENT SURGERY
Discharge: HOME | End: 2021-08-19
Attending: ORTHOPAEDIC SURGERY | Admitting: ORTHOPAEDIC SURGERY
Payer: COMMERCIAL

## 2021-08-19 ENCOUNTER — ANESTHESIA (OUTPATIENT)
Dept: OPERATING ROOM | Age: 41
End: 2021-08-19
Payer: COMMERCIAL

## 2021-08-19 VITALS
SYSTOLIC BLOOD PRESSURE: 101 MMHG | OXYGEN SATURATION: 95 % | TEMPERATURE: 96.1 F | DIASTOLIC BLOOD PRESSURE: 60 MMHG | RESPIRATION RATE: 15 BRPM

## 2021-08-19 VITALS
HEART RATE: 73 BPM | DIASTOLIC BLOOD PRESSURE: 106 MMHG | RESPIRATION RATE: 18 BRPM | OXYGEN SATURATION: 93 % | WEIGHT: 235 LBS | TEMPERATURE: 97.9 F | HEIGHT: 72 IN | SYSTOLIC BLOOD PRESSURE: 159 MMHG | BODY MASS INDEX: 31.83 KG/M2

## 2021-08-19 DIAGNOSIS — G89.18 ACUTE POSTOPERATIVE PAIN: Primary | ICD-10-CM

## 2021-08-19 PROCEDURE — 6360000002 HC RX W HCPCS: Performed by: ORTHOPAEDIC SURGERY

## 2021-08-19 PROCEDURE — 3700000001 HC ADD 15 MINUTES (ANESTHESIA): Performed by: ORTHOPAEDIC SURGERY

## 2021-08-19 PROCEDURE — 2580000003 HC RX 258: Performed by: ANESTHESIOLOGY

## 2021-08-19 PROCEDURE — 2500000003 HC RX 250 WO HCPCS: Performed by: ORTHOPAEDIC SURGERY

## 2021-08-19 PROCEDURE — 6360000002 HC RX W HCPCS: Performed by: NURSE ANESTHETIST, CERTIFIED REGISTERED

## 2021-08-19 PROCEDURE — 2500000003 HC RX 250 WO HCPCS: Performed by: NURSE ANESTHETIST, CERTIFIED REGISTERED

## 2021-08-19 PROCEDURE — 3600000013 HC SURGERY LEVEL 3 ADDTL 15MIN: Performed by: ORTHOPAEDIC SURGERY

## 2021-08-19 PROCEDURE — 7100000000 HC PACU RECOVERY - FIRST 15 MIN: Performed by: ORTHOPAEDIC SURGERY

## 2021-08-19 PROCEDURE — 7100000010 HC PHASE II RECOVERY - FIRST 15 MIN: Performed by: ORTHOPAEDIC SURGERY

## 2021-08-19 PROCEDURE — 7100000011 HC PHASE II RECOVERY - ADDTL 15 MIN: Performed by: ORTHOPAEDIC SURGERY

## 2021-08-19 PROCEDURE — 3700000000 HC ANESTHESIA ATTENDED CARE: Performed by: ORTHOPAEDIC SURGERY

## 2021-08-19 PROCEDURE — 2720000010 HC SURG SUPPLY STERILE: Performed by: ORTHOPAEDIC SURGERY

## 2021-08-19 PROCEDURE — 6360000002 HC RX W HCPCS: Performed by: ANESTHESIOLOGY

## 2021-08-19 PROCEDURE — C9290 INJ, BUPIVACAINE LIPOSOME: HCPCS | Performed by: ORTHOPAEDIC SURGERY

## 2021-08-19 PROCEDURE — 2580000003 HC RX 258: Performed by: ORTHOPAEDIC SURGERY

## 2021-08-19 PROCEDURE — 2709999900 HC NON-CHARGEABLE SUPPLY: Performed by: ORTHOPAEDIC SURGERY

## 2021-08-19 PROCEDURE — 3600000003 HC SURGERY LEVEL 3 BASE: Performed by: ORTHOPAEDIC SURGERY

## 2021-08-19 PROCEDURE — 7100000001 HC PACU RECOVERY - ADDTL 15 MIN: Performed by: ORTHOPAEDIC SURGERY

## 2021-08-19 RX ORDER — SODIUM CHLORIDE 0.9 % (FLUSH) 0.9 %
10 SYRINGE (ML) INJECTION PRN
Status: DISCONTINUED | OUTPATIENT
Start: 2021-08-19 | End: 2021-08-19 | Stop reason: HOSPADM

## 2021-08-19 RX ORDER — SODIUM CHLORIDE 9 MG/ML
25 INJECTION, SOLUTION INTRAVENOUS PRN
Status: DISCONTINUED | OUTPATIENT
Start: 2021-08-19 | End: 2021-08-19 | Stop reason: HOSPADM

## 2021-08-19 RX ORDER — ROCURONIUM BROMIDE 10 MG/ML
INJECTION, SOLUTION INTRAVENOUS PRN
Status: DISCONTINUED | OUTPATIENT
Start: 2021-08-19 | End: 2021-08-19 | Stop reason: SDUPTHER

## 2021-08-19 RX ORDER — SODIUM CHLORIDE, SODIUM LACTATE, POTASSIUM CHLORIDE, CALCIUM CHLORIDE 600; 310; 30; 20 MG/100ML; MG/100ML; MG/100ML; MG/100ML
INJECTION, SOLUTION INTRAVENOUS CONTINUOUS
Status: DISCONTINUED | OUTPATIENT
Start: 2021-08-20 | End: 2021-08-19 | Stop reason: HOSPADM

## 2021-08-19 RX ORDER — BUPIVACAINE HYDROCHLORIDE AND EPINEPHRINE 2.5; 5 MG/ML; UG/ML
INJECTION, SOLUTION EPIDURAL; INFILTRATION; INTRACAUDAL; PERINEURAL
Status: DISCONTINUED
Start: 2021-08-19 | End: 2021-08-19 | Stop reason: HOSPADM

## 2021-08-19 RX ORDER — FENTANYL CITRATE 50 UG/ML
50 INJECTION, SOLUTION INTRAMUSCULAR; INTRAVENOUS EVERY 5 MIN PRN
Status: DISCONTINUED | OUTPATIENT
Start: 2021-08-19 | End: 2021-08-19 | Stop reason: HOSPADM

## 2021-08-19 RX ORDER — HYDROCODONE BITARTRATE AND ACETAMINOPHEN 5; 325 MG/1; MG/1
2 TABLET ORAL PRN
Status: DISCONTINUED | OUTPATIENT
Start: 2021-08-19 | End: 2021-08-19 | Stop reason: HOSPADM

## 2021-08-19 RX ORDER — FENTANYL CITRATE 50 UG/ML
INJECTION, SOLUTION INTRAMUSCULAR; INTRAVENOUS PRN
Status: DISCONTINUED | OUTPATIENT
Start: 2021-08-19 | End: 2021-08-19 | Stop reason: SDUPTHER

## 2021-08-19 RX ORDER — SODIUM CHLORIDE 0.9 % (FLUSH) 0.9 %
10 SYRINGE (ML) INJECTION EVERY 12 HOURS SCHEDULED
Status: DISCONTINUED | OUTPATIENT
Start: 2021-08-19 | End: 2021-08-19 | Stop reason: HOSPADM

## 2021-08-19 RX ORDER — FENTANYL CITRATE 50 UG/ML
25 INJECTION, SOLUTION INTRAMUSCULAR; INTRAVENOUS EVERY 5 MIN PRN
Status: DISCONTINUED | OUTPATIENT
Start: 2021-08-19 | End: 2021-08-19 | Stop reason: HOSPADM

## 2021-08-19 RX ORDER — ONDANSETRON 2 MG/ML
INJECTION INTRAMUSCULAR; INTRAVENOUS PRN
Status: DISCONTINUED | OUTPATIENT
Start: 2021-08-19 | End: 2021-08-19 | Stop reason: SDUPTHER

## 2021-08-19 RX ORDER — LIDOCAINE HYDROCHLORIDE 20 MG/ML
INJECTION, SOLUTION EPIDURAL; INFILTRATION; INTRACAUDAL; PERINEURAL PRN
Status: DISCONTINUED | OUTPATIENT
Start: 2021-08-19 | End: 2021-08-19 | Stop reason: SDUPTHER

## 2021-08-19 RX ORDER — DOCUSATE SODIUM 100 MG/1
100 CAPSULE, LIQUID FILLED ORAL 2 TIMES DAILY
Qty: 30 CAPSULE | Refills: 0 | Status: SHIPPED | OUTPATIENT
Start: 2021-08-19 | End: 2021-10-15

## 2021-08-19 RX ORDER — ONDANSETRON 4 MG/1
4 TABLET, FILM COATED ORAL EVERY 6 HOURS PRN
Qty: 30 TABLET | Refills: 1 | Status: SHIPPED | OUTPATIENT
Start: 2021-08-19 | End: 2022-06-27 | Stop reason: ALTCHOICE

## 2021-08-19 RX ORDER — OXYCODONE HYDROCHLORIDE AND ACETAMINOPHEN 5; 325 MG/1; MG/1
1-2 TABLET ORAL EVERY 4 HOURS PRN
Qty: 50 TABLET | Refills: 0 | Status: SHIPPED | OUTPATIENT
Start: 2021-08-19 | End: 2021-08-19 | Stop reason: HOSPADM

## 2021-08-19 RX ORDER — EPINEPHRINE 1 MG/ML
INJECTION INTRAMUSCULAR; INTRAVENOUS; SUBCUTANEOUS
Status: DISCONTINUED
Start: 2021-08-19 | End: 2021-08-19 | Stop reason: HOSPADM

## 2021-08-19 RX ORDER — LABETALOL HYDROCHLORIDE 5 MG/ML
INJECTION, SOLUTION INTRAVENOUS PRN
Status: DISCONTINUED | OUTPATIENT
Start: 2021-08-19 | End: 2021-08-19 | Stop reason: SDUPTHER

## 2021-08-19 RX ORDER — SODIUM CHLORIDE 9 MG/ML
INJECTION, SOLUTION INTRAVENOUS CONTINUOUS
Status: DISCONTINUED | OUTPATIENT
Start: 2021-08-20 | End: 2021-08-19

## 2021-08-19 RX ORDER — ONDANSETRON 2 MG/ML
4 INJECTION INTRAMUSCULAR; INTRAVENOUS
Status: COMPLETED | OUTPATIENT
Start: 2021-08-19 | End: 2021-08-19

## 2021-08-19 RX ORDER — MIDAZOLAM HYDROCHLORIDE 1 MG/ML
INJECTION INTRAMUSCULAR; INTRAVENOUS PRN
Status: DISCONTINUED | OUTPATIENT
Start: 2021-08-19 | End: 2021-08-19 | Stop reason: SDUPTHER

## 2021-08-19 RX ORDER — HYDROCODONE BITARTRATE AND ACETAMINOPHEN 5; 325 MG/1; MG/1
1 TABLET ORAL PRN
Status: DISCONTINUED | OUTPATIENT
Start: 2021-08-19 | End: 2021-08-19 | Stop reason: HOSPADM

## 2021-08-19 RX ORDER — PROPOFOL 10 MG/ML
INJECTION, EMULSION INTRAVENOUS PRN
Status: DISCONTINUED | OUTPATIENT
Start: 2021-08-19 | End: 2021-08-19 | Stop reason: SDUPTHER

## 2021-08-19 RX ORDER — DEXAMETHASONE SODIUM PHOSPHATE 10 MG/ML
INJECTION, SOLUTION INTRAMUSCULAR; INTRAVENOUS PRN
Status: DISCONTINUED | OUTPATIENT
Start: 2021-08-19 | End: 2021-08-19 | Stop reason: SDUPTHER

## 2021-08-19 RX ORDER — LIDOCAINE HYDROCHLORIDE 10 MG/ML
1 INJECTION, SOLUTION EPIDURAL; INFILTRATION; INTRACAUDAL; PERINEURAL
Status: DISCONTINUED | OUTPATIENT
Start: 2021-08-20 | End: 2021-08-19 | Stop reason: HOSPADM

## 2021-08-19 RX ORDER — HYDROCODONE BITARTRATE AND ACETAMINOPHEN 5; 325 MG/1; MG/1
1 TABLET ORAL EVERY 4 HOURS PRN
Qty: 42 TABLET | Refills: 0 | Status: SHIPPED | OUTPATIENT
Start: 2021-08-19 | End: 2021-08-26

## 2021-08-19 RX ADMIN — SODIUM CHLORIDE, POTASSIUM CHLORIDE, SODIUM LACTATE AND CALCIUM CHLORIDE: 600; 310; 30; 20 INJECTION, SOLUTION INTRAVENOUS at 10:22

## 2021-08-19 RX ADMIN — ONDANSETRON 4 MG: 2 INJECTION INTRAMUSCULAR; INTRAVENOUS at 16:03

## 2021-08-19 RX ADMIN — CEFAZOLIN 2000 MG: 10 INJECTION, POWDER, FOR SOLUTION INTRAVENOUS at 12:51

## 2021-08-19 RX ADMIN — SUGAMMADEX 200 MG: 100 INJECTION, SOLUTION INTRAVENOUS at 13:49

## 2021-08-19 RX ADMIN — MIDAZOLAM 2 MG: 1 INJECTION INTRAMUSCULAR; INTRAVENOUS at 12:24

## 2021-08-19 RX ADMIN — Medication 50 MCG: at 12:28

## 2021-08-19 RX ADMIN — DEXAMETHASONE SODIUM PHOSPHATE 10 MG: 10 INJECTION INTRAMUSCULAR; INTRAVENOUS at 12:55

## 2021-08-19 RX ADMIN — LIDOCAINE HYDROCHLORIDE 100 MG: 20 INJECTION, SOLUTION EPIDURAL; INFILTRATION; INTRACAUDAL; PERINEURAL at 12:28

## 2021-08-19 RX ADMIN — ROCURONIUM BROMIDE 40 MG: 10 INJECTION, SOLUTION INTRAVENOUS at 12:28

## 2021-08-19 RX ADMIN — ROCURONIUM BROMIDE 10 MG: 10 INJECTION, SOLUTION INTRAVENOUS at 12:58

## 2021-08-19 RX ADMIN — Medication 50 MCG: at 13:08

## 2021-08-19 RX ADMIN — ONDANSETRON 4 MG: 2 INJECTION, SOLUTION INTRAMUSCULAR; INTRAVENOUS at 13:31

## 2021-08-19 RX ADMIN — LABETALOL HYDROCHLORIDE 5 MG: 5 INJECTION, SOLUTION INTRAVENOUS at 13:43

## 2021-08-19 RX ADMIN — PROPOFOL 200 MG: 10 INJECTION, EMULSION INTRAVENOUS at 12:28

## 2021-08-19 RX ADMIN — FENTANYL CITRATE 50 MCG: 50 INJECTION, SOLUTION INTRAMUSCULAR; INTRAVENOUS at 14:56

## 2021-08-19 ASSESSMENT — PULMONARY FUNCTION TESTS
PIF_VALUE: 24
PIF_VALUE: 22
PIF_VALUE: 18
PIF_VALUE: 22
PIF_VALUE: 24
PIF_VALUE: 22
PIF_VALUE: 24
PIF_VALUE: 21
PIF_VALUE: 4
PIF_VALUE: 24
PIF_VALUE: 0
PIF_VALUE: 0
PIF_VALUE: 3
PIF_VALUE: 23
PIF_VALUE: 2
PIF_VALUE: 24
PIF_VALUE: 39
PIF_VALUE: 22
PIF_VALUE: 24
PIF_VALUE: 22
PIF_VALUE: 23
PIF_VALUE: 3
PIF_VALUE: 2
PIF_VALUE: 2
PIF_VALUE: 22
PIF_VALUE: 22
PIF_VALUE: 2
PIF_VALUE: 24
PIF_VALUE: 22
PIF_VALUE: 24
PIF_VALUE: 48
PIF_VALUE: 24
PIF_VALUE: 24
PIF_VALUE: 21
PIF_VALUE: 23
PIF_VALUE: 22
PIF_VALUE: 0
PIF_VALUE: 8
PIF_VALUE: 24
PIF_VALUE: 22
PIF_VALUE: 1
PIF_VALUE: 22
PIF_VALUE: 27
PIF_VALUE: 2
PIF_VALUE: 24
PIF_VALUE: 22
PIF_VALUE: 29
PIF_VALUE: 30
PIF_VALUE: 1
PIF_VALUE: 2
PIF_VALUE: 22
PIF_VALUE: 19
PIF_VALUE: 22
PIF_VALUE: 22
PIF_VALUE: 24
PIF_VALUE: 22
PIF_VALUE: 22
PIF_VALUE: 4
PIF_VALUE: 5
PIF_VALUE: 2
PIF_VALUE: 22
PIF_VALUE: 23
PIF_VALUE: 0
PIF_VALUE: 22
PIF_VALUE: 15
PIF_VALUE: 24
PIF_VALUE: 23
PIF_VALUE: 24
PIF_VALUE: 22
PIF_VALUE: 24
PIF_VALUE: 24
PIF_VALUE: 22
PIF_VALUE: 23
PIF_VALUE: 25
PIF_VALUE: 24
PIF_VALUE: 22
PIF_VALUE: 23
PIF_VALUE: 24
PIF_VALUE: 28
PIF_VALUE: 22
PIF_VALUE: 24
PIF_VALUE: 24
PIF_VALUE: 2
PIF_VALUE: 2
PIF_VALUE: 24
PIF_VALUE: 2
PIF_VALUE: 24
PIF_VALUE: 24
PIF_VALUE: 23
PIF_VALUE: 22
PIF_VALUE: 25
PIF_VALUE: 22
PIF_VALUE: 2
PIF_VALUE: 23
PIF_VALUE: 24
PIF_VALUE: 4
PIF_VALUE: 22
PIF_VALUE: 23
PIF_VALUE: 22
PIF_VALUE: 15
PIF_VALUE: 23
PIF_VALUE: 20
PIF_VALUE: 24
PIF_VALUE: 0
PIF_VALUE: 18

## 2021-08-19 ASSESSMENT — PAIN DESCRIPTION - ONSET
ONSET: ON-GOING

## 2021-08-19 ASSESSMENT — PAIN DESCRIPTION - PAIN TYPE
TYPE: SURGICAL PAIN

## 2021-08-19 ASSESSMENT — PAIN DESCRIPTION - ORIENTATION
ORIENTATION: RIGHT

## 2021-08-19 ASSESSMENT — PAIN DESCRIPTION - LOCATION
LOCATION: SHOULDER

## 2021-08-19 ASSESSMENT — PAIN SCALES - GENERAL
PAINLEVEL_OUTOF10: 2
PAINLEVEL_OUTOF10: 2
PAINLEVEL_OUTOF10: 7

## 2021-08-19 ASSESSMENT — PAIN - FUNCTIONAL ASSESSMENT: PAIN_FUNCTIONAL_ASSESSMENT: 0-10

## 2021-08-19 ASSESSMENT — ENCOUNTER SYMPTOMS: SHORTNESS OF BREATH: 0

## 2021-08-19 ASSESSMENT — PAIN DESCRIPTION - DESCRIPTORS
DESCRIPTORS: SORE

## 2021-08-19 ASSESSMENT — PAIN DESCRIPTION - FREQUENCY
FREQUENCY: CONTINUOUS

## 2021-08-19 NOTE — ANESTHESIA POSTPROCEDURE EVALUATION
Department of Anesthesiology  Postprocedure Note    Patient: Nba Bailey  MRN: [id number]  YOB: 1980  Date of evaluation: 8/19/2021  Time:  2:57 PM     Procedure Summary     Date: 08/19/21 Room / Location: [place] [address] / [facility] - INPATIENT    Anesthesia Start: 5170 Anesthesia Stop: 4607    Procedure: RIGHT SHOULDER ARTHROSCOPY WITH DISTAL CLAVICLE EXCISION- S/N (Right Shoulder) Diagnosis: (DX OA RIGHT AC JOINT, IMPINGEMENT SYNDROME RIGHT SHOULDER)    Surgeons: Gail Null MD Responsible Provider: Agustina Lamb MD    Anesthesia Type: general ASA Status: 2          Anesthesia Type: general    Pako Phase I: Pako Score: 8    Pako Phase II:      Last vitals: Reviewed and per EMR flowsheets.        Anesthesia Post Evaluation    Complications: no

## 2021-08-19 NOTE — INTERVAL H&P NOTE
Update History & Physical    The patient's History and Physical of August 19, 2021 was reviewed with the patient and I examined the patient. There was no change. The surgical site was confirmed by the patient and me. Plan: The risks, benefits, expected outcome, and alternative to the recommended procedure have been discussed with the patient. Patient understands and wants to proceed with the procedure.      Electronically signed by Rosibel Finn MD on 8/19/2021 at 10:19 AM

## 2021-08-19 NOTE — PROGRESS NOTES
Pt resting in bed with wife at bedside. Three prescriptions given to pt spouse. Pt and pt spouse updated on plan of care and all questions answered. Support provided. Will continue to monitor.

## 2021-08-19 NOTE — ANESTHESIA PRE PROCEDURE
Department of Anesthesiology  Preprocedure Note       Name:  Carol Dates   Age:  42 y.o.  :  1980                                          MRN:  [id number]         Date:  2021      Surgeon: Bonnie Macario):  Magaly Meng MD    Procedure: Procedure(s):  RIGHT SHOULDER ARTHROSCOPY WITH DISTAL CLAVICLE EXCISION- S/N    Medications prior to admission:   Prior to Admission medications    Medication Sig Start Date End Date Taking? Authorizing Provider   docusate sodium (COLACE) 100 MG capsule Take 1 capsule by mouth 2 times daily 21  Yes Magaly Meng MD   ondansetron [facility]) 4 MG tablet Take 1 tablet by mouth every 6 hours as needed for Nausea 21  Yes Magaly Meng MD   HYDROcodone-acetaminophen (NORCO) 5-325 MG per tablet Take 1 tablet by mouth every 4 hours as needed for Pain for up to 7 days. Intended supply: 7 days.  Take lowest dose possible to manage pain 21 Yes Magaly Meng MD   famotidine (PEPCID) 20 MG tablet Take 20 mg by mouth nightly as needed   Yes Historical Provider, MD   losartan-hydroCHLOROthiazide (HYZAAR) 100-25 MG per tablet Take 1 tablet by mouth daily 5/10/21  Yes Owen Beltre DO   fexofenadine (ALLEGRA) 180 MG tablet Take 1 tablet by mouth daily 5/10/21  Yes Owen Beltre DO       Current medications:    Current Facility-Administered Medications   Medication Dose Route Frequency Provider Last Rate Last Admin    [START ON 2021] lactated ringers infusion   Intravenous Continuous Cecilia Nett  mL/hr at 21 1022 New Bag at 21 1022    sodium chloride flush 0.9 % injection 10 mL  10 mL Intravenous 2 times per day Ari Antony DO        sodium chloride flush 0.9 % injection 10 mL  10 mL Intravenous PRN Cecilia Nett DO        0.9 % sodium chloride infusion  25 mL Intravenous PRN Ari Antony DO        [START ON 2021] lidocaine PF 1 % injection 1 mL  1 mL Intradermal Once PRN Ari BUTTS Suzanne Lucero DO        EPINEPHrine (Anaphylaxis) 30 MG/30ML injection             bupivacaine liposome (EXPAREL) 1.3 % injection             bupivacaine-EPINEPHrine PF (MARCAINE-w/EPINEPHrine) 0.25% -1:[id number] injection              Facility-Administered Medications Ordered in Other Encounters   Medication Dose Route Frequency Provider Last Rate Last Admin    midazolam (VERSED) injection   Intravenous PRN Pearce Riding, APRN - CRNA   2 mg at 08/19/21 1224    fentaNYL (SUBLIMAZE) injection   Intravenous PRN Pearce Riding, APRN - CRNA   50 mcg at 08/19/21 1228    rocuronium (ZEMURON) injection   Intravenous PRN Pearce Riding, APRN - CRNA   40 mg at 08/19/21 1228    propofol injection   Intravenous PRN Pearce Riding, APRN - CRNA   200 mg at 08/19/21 1228    lidocaine PF 2 % injection   Intravenous PRN Pearce Riding, APRN - CRNA   100 mg at 08/19/21 1228    dexamethasone (PF) (DECADRON) injection   Intravenous PRN Pearce Riding, APRN - CRNA   10 mg at 08/19/21 1255       Allergies:     Allergies   Allergen Reactions    Ace Inhibitors      cough       Problem List:    Patient Active Problem List   Diagnosis Code    Essential hypertension I10    Abdominal pain, right upper quadrant R10.11    Family history of colon cancer Z80.0    GERD without esophagitis K21.9       Past Medical History:        Diagnosis Date    GERD (gastroesophageal reflux disease)     H/O cardiovascular stress test 08/2020    SMALL INFERIOR ISCHEMIA AREA    History of shingles 07/2020    Hypertension     Palpitations        Past Surgical History:        Procedure Laterality Date    CAPSULE ENDOSCOPY N/A 1/26/2021    ESOPHAGEAL CAPSULE ENDOSCOPY performed by Florentino Mckee DO at [facility]    CARDIAC CATHETERIZATION  09/21/2020    NML CORS / NML LV FUNCTION  /  DR Wade Herron COLONOSCOPY N/A 9/25/2020    COLONOSCOPY DIAGNOSTIC performed by Lorrie Lira MD at [address] [place] [address] 6/26/2020    EGD BIOPSY performed by Kt Navarrete MD at [address]  1/26/2021    EGD BIOPSY performed by Alfa Caldwell DO at [facility]    WISDOM TOOTH EXTRACTION Bilateral        Social History:    Social History     Tobacco Use    Smoking status: Never Smoker    Smokeless tobacco: Never Used   Substance Use Topics    Alcohol use: No                                Counseling given: Not Answered      Vital Signs (Current):   Vitals:    08/19/21 1006 08/19/21 1014   BP: (!) 143/96    Pulse: 75    Resp: 16    Temp: 97.1 °F (36.2 °C)    TempSrc: Temporal    SpO2: 98%    Weight:  235 lb (106.6 kg)   Height:  6' (1.829 m)                                              BP Readings from Last 3 Encounters:   08/19/21 (!) 143/96   08/06/21 133/76   07/23/21 134/78       NPO Status: Time of last liquid consumption: 2000                        Time of last solid consumption: 2000                        Date of last liquid consumption: 08/18/21                        Date of last solid food consumption: 08/18/21    BMI:   Wt Readings from Last 3 Encounters:   08/19/21 235 lb (106.6 kg)   08/06/21 245 lb 1.6 oz (111.2 kg)   07/23/21 249 lb (112.9 kg)     Body mass index is 31.87 kg/m².     CBC:   Lab Results   Component Value Date    WBC 8.3 08/06/2021    RBC 5.15 08/06/2021    RBC 5.06 12/22/2011    HGB 15.2 08/06/2021    HCT 47.5 08/06/2021    MCV 92.2 08/06/2021    RDW 11.9 08/06/2021     08/06/2021     12/22/2011       CMP:   Lab Results   Component Value Date     08/06/2021    K 3.9 08/06/2021     08/06/2021    CO2 26 08/06/2021    BUN 19 08/06/2021    CREATININE 1.01 08/06/2021    GFRAA >60 08/06/2021    LABGLOM >60 08/06/2021    GLUCOSE 101 10/06/2020    GLUCOSE 108 12/22/2011    PROT 7.2 10/06/2020    CALCIUM 9.3 10/06/2020    BILITOT 0.46 10/06/2020    ALKPHOS 90 10/06/2020    AST 18 10/06/2020    ALT 22 10/06/2020       POC Tests: No results for input(s): POCGLU, POCNA, POCK, POCCL, POCBUN, POCHEMO, POCHCT in the last 72 hours. Coags: No results found for: PROTIME, INR, APTT    HCG (If Applicable): No results found for: PREGTESTUR, PREGSERUM, HCG, HCGQUANT     ABGs: No results found for: PHART, PO2ART, IOP6AAW, WCO3PQJ, BEART, P5PESRSV     Type & Screen (If Applicable):  No results found for: LABABO, LABRH    Drug/Infectious Status (If Applicable):  No results found for: HIV, HEPCAB    COVID-19 Screening (If Applicable):   Lab Results   Component Value Date    COVID19 Not Detected 01/22/2021    COVID19 Not Detected 09/21/2020    COVID19 Not Detected 06/22/2020           Anesthesia Evaluation    Airway: Mallampati: I  TM distance: >3 FB   Neck ROM: full  Mouth opening: > = 3 FB Dental:          Pulmonary:       (-) shortness of breath                           Cardiovascular:    (+) hypertension:,     (-)  angina                Neuro/Psych:               GI/Hepatic/Renal:             Endo/Other:                     Abdominal:             Vascular:           Other Findings:             Anesthesia Plan      general     ASA 2                                 Mikey Yip MD   8/19/2021

## 2021-08-19 NOTE — OP NOTE
Operative Note      Patient: Carol Dates  YOB: 1980  MRN: [id number]    Date of Procedure: 8/19/2021    Pre-Op Diagnosis: DX OA RIGHT AC JOINT, IMPINGEMENT SYNDROME RIGHT SHOULDER    Post-Op Diagnosis: Same       Procedure(s):  RIGHT SHOULDER ARTHROSCOPY WITH ACROMIOPLASTY AND DISTAL CLAVICLE EXCISION , suprascapular nerve block    Surgeon(s):  Magaly Meng MD    Assistant:   Resident: Destinee Hernandez DO    Anesthesia: General    Estimated Blood Loss (mL): Minimal    Complications: None    Specimens:   * No specimens in log *    Implants:  * No implants in log *      Drains: * No LDAs found *    Findings: Large hook in the anterior acromion with AC joint osteoarthritis. Intact cuff normal glenohumeral joint. Detailed Description of Procedure: This is a 69-year-old male with a longstanding history of right shoulder pain. He is elected to undergo a shoulder arthroscopy for treatment of his problems. After informed consent was obtained the patient was brought to the operating room placed supine on the operating table and placed under anesthesia. After the patient was placed under anesthesia he was positioned in a lateral decubitus position with the right shoulder up. An axillary roll was placed 1 handbreadth below his axilla. All bony prominences were padded. Examination under anesthesia showed that he had full passive range of motion without instability. The arm was scrubbed with a hydrogen peroxide solution and a chlorhexidine soap. The arm was then prepared with a ChloraPrep solution of intermittent drying time was draped in sterile fashion. After the arm was prepped and draped a timeout was completed. After timeout was completed a spinal needle was inserted just posterior to the [facility] joint and a suprascapular nerve block was performed using 30 cc of our Exparel and Marcaine solution.   After the block was administered a standard posterior arthroscopic portal was created and diagnostic arthroscopy the joint is performed. Patient's rotator interval and subscapularis were normal.  His anterior posterior and inferior labrum was all look normal.  His biceps tendon and biceps tendon attachment were normal.  The supraspinatus and infraspinatus and posterior cuff structures were all intact. We then went into the subacromial space. In the subacromial space patient had a large amount of bursa. Complete bursectomy was performed. The rotator cuff was examined and seen to be intact. Patient then had an acromioplasty placed. After the acromioplasty was completed the arthritic distal clavicle was identified in the distance. A distal clavicle excision was then performed and 1 cm of distal clavicle was excised. After the distal clavicle excision was completed final pictures were taken. A spinal needle was inserted into the subacromial space and the remainder of her Exparel solution was injected. Patient's arthroscopic portal sites were closed with a 3-0 nylon suture and he was transported to recovery in stable condition.     Electronically signed by Jina Prescott MD on 8/19/2021 at 1:52 PM

## 2021-09-02 ENCOUNTER — OFFICE VISIT (OUTPATIENT)
Dept: UROLOGY | Age: 41
End: 2021-09-02
Payer: COMMERCIAL

## 2021-09-02 VITALS
BODY MASS INDEX: 31.83 KG/M2 | TEMPERATURE: 98.1 F | DIASTOLIC BLOOD PRESSURE: 100 MMHG | WEIGHT: 235 LBS | HEART RATE: 85 BPM | SYSTOLIC BLOOD PRESSURE: 142 MMHG | HEIGHT: 72 IN

## 2021-09-02 DIAGNOSIS — R31.21 ASYMPTOMATIC MICROSCOPIC HEMATURIA: Primary | ICD-10-CM

## 2021-09-02 PROCEDURE — 99204 OFFICE O/P NEW MOD 45 MIN: CPT | Performed by: UROLOGY

## 2021-09-02 ASSESSMENT — ENCOUNTER SYMPTOMS
VOMITING: 0
EYE PAIN: 0
WHEEZING: 0
SHORTNESS OF BREATH: 0
CONSTIPATION: 0
NAUSEA: 0
ABDOMINAL PAIN: 0
BACK PAIN: 0
EYE REDNESS: 0
DIARRHEA: 0
COUGH: 0

## 2021-09-02 NOTE — PROGRESS NOTES
[address] [address]  Dept: [address] [facility] Urology Office Note - New Patient    Patient:  Nba Bailey  YOB: 1980  Date: 9/2/2021    The patient is a 42 y.o. male who presentstoday for evaluation of the following problems:   Chief Complaint   Patient presents with    New Patient     Kidney stone urinary hesitancy dysuria    referred by Donald Sims DO.    HPI  Here for hematuria and h/o kidney stones,  No dysuria, no gross hematuria, just micro. Had a recent episode of dysuria nad urine retention, but it resolved. (Patient's old records have been requested, reviewed and summarized in today's note.)    Summary of old records: N/A    History: N/A    ProceduresToday: N/A    Urinalysis today:  No results found for this visit on 09/02/21. AUA Symptom Score (9/2/2021):   INCOMPLETE EMPTYING: How often have you had the sensation of not emptying your bladder?: Not at all  FREQUENCY: How often do you have to urinate less than every two hours?: Not at all  INTERMITTENCY: How often have you found you stopped and started again several times when you urinated?: Not at all  URGENCY: How often have you found it difficult to postpone urination?: Not at all  WEAK STREAM: How often have you had a weak urinary stream?: Not at all  STRAINING: How often have you had to strain to start  urination?: Not at all  NOCTURIA: How many times did you typically get up at night to uriniate?: NONE  TOTAL I-PSS SCORE[de-identified] 0       Last BUN andcreatinine:  Lab Results   Component Value Date    BUN 19 08/06/2021     Lab Results   Component Value Date    CREATININE 1.01 08/06/2021       Additional Lab/Culture results: none    Reviewed during this Office Visit: none  (results were independently reviewed byphysician and radiology report verified)    PAST MEDICAL, FAMILY AND SOCIAL HISTORY:  Past Medical History: Diagnosis Date    GERD (gastroesophageal reflux disease)     H/O cardiovascular stress test 08/2020    SMALL INFERIOR ISCHEMIA AREA    History of shingles 07/2020    Hypertension     Palpitations      Past Surgical History:   Procedure Laterality Date    CAPSULE ENDOSCOPY N/A 1/26/2021    ESOPHAGEAL CAPSULE ENDOSCOPY performed by Jaspal Quezada DO at [address] [place] [address] Road  09/21/2020    NML CORS / NML LV FUNCTION  /  DR Prashanth Salvador    COLONOSCOPY N/A 9/25/2020    COLONOSCOPY DIAGNOSTIC performed by Flaquito Sethi MD at [address] [place] [address],[facility] ARTHROSCOPY Right 8/19/2021    RIGHT SHOULDER ARTHROSCOPY WITH DISTAL CLAVICLE EXCISION- S/N performed by Ava Lynn MD at [address] N/A 6/26/2020    EGD BIOPSY performed by Flaquito Sethi MD at [address]  1/26/2021    EGD BIOPSY performed by Jaspal Quezada DO at [facility]    WISDOM TOOTH EXTRACTION Bilateral      Family History   Problem Relation Age of Onset    Cancer Mother     Colon Cancer Mother     High Blood Pressure Father     Stroke Father      Outpatient Medications Marked as Taking for the 9/2/21 encounter (Office Visit) with Ranjith Sims MD   Medication Sig Dispense Refill    famotidine (PEPCID) 20 MG tablet Take 20 mg by mouth nightly as needed      losartan-hydroCHLOROthiazide (HYZAAR) 100-25 MG per tablet Take 1 tablet by mouth daily 90 tablet 3    fexofenadine (ALLEGRA) 180 MG tablet Take 1 tablet by mouth daily 90 tablet 1       Ace inhibitors  Social History     Tobacco Use   Smoking Status Never Smoker   Smokeless Tobacco Never Used      (If patient a smoker, smoking cessation counseling offered)   Social History     Substance and Sexual Activity   Alcohol Use No       REVIEW OF SYSTEMS:  Review of Systems    Physical Exam:    This a 42 y.o. female      Vitals:    09/02/21 0956   BP: (!) 142/100   Pulse: 85   Temp: 98.1 °F (36.7 °C)     Body mass index is 31.87 kg/m². Physical Exam  Constitutional: Patient in no acute distress, ggod grooming, appropriately dressed  Neuro: Alert and oriented to person, place and time. Psych:Mood normal, affect normal  Skin: No rash noted  HEENT: Head: Normocephalic and atraumatic,Conjunctivae and EOM are normal,Nose- normal, Right/Left External Ear: normal, Mouth: Mucosa Moist  Neck: Supple  Lungs: Respiratory effort is normal  Cardiovascular: strong and regular, no lower leg edema  Abdomen: Soft, non-tender, non-distended with no CVA,    Lymphatics: No cervical palpable lymphadenopathy. Bladder non-tender and not distended. Musculoskeletal: Normal gait and station        Assessment and Plan      1. Asymptomatic microscopic hematuria            Plan:        Prescriptions Ordered:  No orders of the defined types were placed in this encounter. Orders Placed:  Orders Placed This Encounter   Procedures    CT Urogram     Standing Status:   Future     Standing Expiration Date:   9/2/2022    Basic Metabolic Panel     Standing Status:   Future     Standing Expiration Date:   9/2/2022            Mariya Harris MD    Agree with the ROS entered by the MA.

## 2021-09-10 ENCOUNTER — HOSPITAL ENCOUNTER (OUTPATIENT)
Dept: CT IMAGING | Age: 41
Discharge: HOME | End: 2021-09-12
Payer: COMMERCIAL

## 2021-09-10 DIAGNOSIS — R31.21 ASYMPTOMATIC MICROSCOPIC HEMATURIA: ICD-10-CM

## 2021-09-10 PROCEDURE — 6360000004 HC RX CONTRAST MEDICATION: Performed by: UROLOGY

## 2021-09-10 PROCEDURE — 74178 CT ABD&PLV WO CNTR FLWD CNTR: CPT

## 2021-09-10 PROCEDURE — 2580000003 HC RX 258: Performed by: UROLOGY

## 2021-09-10 RX ORDER — 0.9 % SODIUM CHLORIDE 0.9 %
80 INTRAVENOUS SOLUTION INTRAVENOUS ONCE
Status: COMPLETED | OUTPATIENT
Start: 2021-09-10 | End: 2021-09-10

## 2021-09-10 RX ORDER — SODIUM CHLORIDE 0.9 % (FLUSH) 0.9 %
10 SYRINGE (ML) INJECTION PRN
Status: DISCONTINUED | OUTPATIENT
Start: 2021-09-10 | End: 2021-09-13 | Stop reason: HOSPADM

## 2021-09-10 RX ADMIN — IOPAMIDOL 120 ML: 755 INJECTION, SOLUTION INTRAVENOUS at 07:57

## 2021-09-10 RX ADMIN — SODIUM CHLORIDE, PRESERVATIVE FREE 10 ML: 5 INJECTION INTRAVENOUS at 07:57

## 2021-09-10 RX ADMIN — SODIUM CHLORIDE 80 ML: 9 INJECTION, SOLUTION INTRAVENOUS at 07:57

## 2021-09-23 ENCOUNTER — PROCEDURE VISIT (OUTPATIENT)
Dept: UROLOGY | Age: 41
End: 2021-09-23
Payer: COMMERCIAL

## 2021-09-23 VITALS — BODY MASS INDEX: 31.83 KG/M2 | TEMPERATURE: 96.7 F | WEIGHT: 235 LBS | HEIGHT: 72 IN

## 2021-09-23 DIAGNOSIS — R31.21 ASYMPTOMATIC MICROSCOPIC HEMATURIA: Primary | ICD-10-CM

## 2021-09-23 PROCEDURE — 52000 CYSTOURETHROSCOPY: CPT | Performed by: UROLOGY

## 2021-09-23 NOTE — PROGRESS NOTES
Cystoscopy Operative Note (9/23/21)  Surgeon: Jasmina Rothman MD  Anesthesia: Urethral 2% Xylocaine   Indications: Microhematuria  Position: Dorsal Lithotomy    Findings:   Risks and Benefits discussed with patient prior to procedure. The patient was prepped and draped in the usual sterile fashion. The flexible cystoscope was advanced through the urethra and into the bladder. The bladder was thoroughly inspected and the following was noted:    Residual Urine: none  Urethra: normal appearing urethra with no masses, tenderness or lesions and stenotic  Prostate: partially obstructing lateral lobes of prostate; median lobe present? no.   Bladder: No tumors or CIS noted. No bladder diverticulum. There was none trabeculation noted. Ureters: Clear efflux from both ureters. Orifices with normal configuration and location. The cystoscope was removed. The patient tolerated the procedure well. Agree with the ROS entered by the MA.

## 2021-10-13 RX ORDER — POLYMYXIN B SULFATE AND TRIMETHOPRIM 1; 10000 MG/ML; [USP'U]/ML
SOLUTION OPHTHALMIC
COMMUNITY
Start: 2021-09-10 | End: 2022-06-27 | Stop reason: ALTCHOICE

## 2021-10-13 RX ORDER — PREDNISOLONE ACETATE 10 MG/ML
SUSPENSION/ DROPS OPHTHALMIC
COMMUNITY
Start: 2021-09-13 | End: 2022-06-27 | Stop reason: ALTCHOICE

## 2021-10-13 RX ORDER — DOXYCYCLINE 50 MG/1
TABLET ORAL
COMMUNITY
Start: 2021-09-13 | End: 2021-10-15

## 2021-10-15 ENCOUNTER — OFFICE VISIT (OUTPATIENT)
Dept: FAMILY MEDICINE CLINIC | Age: 41
End: 2021-10-15
Payer: COMMERCIAL

## 2021-10-15 VITALS
SYSTOLIC BLOOD PRESSURE: 132 MMHG | BODY MASS INDEX: 31.83 KG/M2 | DIASTOLIC BLOOD PRESSURE: 89 MMHG | HEIGHT: 72 IN | WEIGHT: 235 LBS | OXYGEN SATURATION: 96 % | HEART RATE: 82 BPM

## 2021-10-15 DIAGNOSIS — Z23 NEEDS FLU SHOT: ICD-10-CM

## 2021-10-15 DIAGNOSIS — K21.9 GERD WITHOUT ESOPHAGITIS: Primary | ICD-10-CM

## 2021-10-15 PROCEDURE — 90674 CCIIV4 VAC NO PRSV 0.5 ML IM: CPT | Performed by: FAMILY MEDICINE

## 2021-10-15 PROCEDURE — 99213 OFFICE O/P EST LOW 20 MIN: CPT | Performed by: FAMILY MEDICINE

## 2021-10-15 PROCEDURE — 90471 IMMUNIZATION ADMIN: CPT | Performed by: FAMILY MEDICINE

## 2021-10-15 NOTE — PROGRESS NOTES
Referral - General (Looking for GI- Lack of communication/ treatment)    Savanna Dave presents this morning for general evaluation and discussion regarding his ongoing GI complaints. Chart has been reviewed. Savanna Dave continues to have selective food intolerance, upper right quadrant abdominal discomfort intermittent, denies blood in stools or melena, denies diarrhea. He would describe his discomfort is tolerable but he continues to worry about underlying cause. Chart has been reviewed and he has seen a GI specialist but would like a second opinion at this time. We have also discussed his cardiac catheterization results of 2020 which were negative, performed at [facility] with Dr. Stacy Islas. Discussed second opinion - GI, favors this     One particular progress note indicates cholecystectomy was discussed. Lab data shows normal gallbladder ultrasound and nuclear scan. Patient is of the opinion however that should this be recommended he would consider having cholecystectomy. Disc cholecystectomy - if agrees willing to pursue this. /89   Pulse 82   Ht 6' (1.829 m)   Wt 235 lb (106.6 kg)   SpO2 96%   BMI 31.87 kg/m²       Review of Systems    Negative for:     Worry / mood complaints  Headache  Dizziness  Visual Disturbance  Hearing Changes  Nasal / sinus Symptoms  Mouth / tooth symptom, pain  Throat pain  Difficulty swallowing  Neck pain  Chest discomfort  Cough  SOB  N/V/D/C  Pelvic area discomfort  Bladder / voiding discomfort  MS complaints   Numbness/tingling/abnormal sensations   Edema / Leg swelling  Dizziness  Fatigue  Bleeding   Skin    Pertinent Pos: See HPI - Bowel complaints      Physical Exam    Alert and oriented to PPT  NAD    HEENT - neg  Neck - no bruits, no lymphadenopathy  Chest  HRRR w/o murmer  LCTAB no wheezes / rhonchi  Abdomen - soft, non-tender, BS  Extremities - 0+ PTE    Gait / Station - stable, no dysequilibrium, uniform pace, no assist device, cane.       Heart Cath - 09/21/2020 - Dr Lisa Costa, (negative)      ASSESSMENT AND PLAN      Diagnosis Orders   1. GERD without esophagitis  Jo King MD, Gastroenterology, [place]   2. Needs flu shot  INFLUENZA, MDCK QUADV, 2 YRS AND OLDER, IM, PF, PREFILL SYR OR SDV, 0.5ML (FLUCELVAX QUADV, PF)     General physical exam unremarkable. Abdominal exam fails to reveal reproducible tenderness at this time  Update regarding influenza vaccine recommendation for 2021  We will follow with patient.     Prefers GI second opinion first then will consider if carmen is next option through Dr Renetta Olguin        Electronicallysigned by Monica Gerard DO on 10/15/2021 at 9:11 AM

## 2021-10-18 ENCOUNTER — TELEPHONE (OUTPATIENT)
Dept: GASTROENTEROLOGY | Age: 41
End: 2021-10-18

## 2021-10-18 NOTE — TELEPHONE ENCOUNTER
Patient did a one time switch due to the follow up communication with his care. Patient verbalized understanding of the one time switch.

## 2021-11-09 ENCOUNTER — OFFICE VISIT (OUTPATIENT)
Dept: GASTROENTEROLOGY | Age: 41
End: 2021-11-09
Payer: COMMERCIAL

## 2021-11-09 VITALS — DIASTOLIC BLOOD PRESSURE: 88 MMHG | WEIGHT: 256 LBS | SYSTOLIC BLOOD PRESSURE: 132 MMHG | BODY MASS INDEX: 34.72 KG/M2

## 2021-11-09 DIAGNOSIS — R10.11 ABDOMINAL PAIN, RIGHT UPPER QUADRANT: ICD-10-CM

## 2021-11-09 DIAGNOSIS — K21.9 GERD WITHOUT ESOPHAGITIS: Primary | ICD-10-CM

## 2021-11-09 PROCEDURE — 99213 OFFICE O/P EST LOW 20 MIN: CPT | Performed by: INTERNAL MEDICINE

## 2021-11-09 RX ORDER — OMEPRAZOLE 20 MG/1
20 CAPSULE, DELAYED RELEASE ORAL 2 TIMES DAILY
Qty: 60 CAPSULE | Refills: 2 | Status: SHIPPED | OUTPATIENT
Start: 2021-11-09 | End: 2022-02-08

## 2021-11-09 ASSESSMENT — ENCOUNTER SYMPTOMS
BLOOD IN STOOL: 0
ANAL BLEEDING: 0
BACK PAIN: 0
RESPIRATORY NEGATIVE: 1
NAUSEA: 1
ABDOMINAL PAIN: 1
WHEEZING: 0
ALLERGIC/IMMUNOLOGIC NEGATIVE: 1
VOMITING: 0
ABDOMINAL DISTENTION: 0
DIARRHEA: 0
CHOKING: 0
EYES NEGATIVE: 1
COUGH: 0
TROUBLE SWALLOWING: 0
CONSTIPATION: 0
RECTAL PAIN: 0

## 2021-11-09 NOTE — PROGRESS NOTES
GI FOLLOW UP    INTERVAL HISTORY: Chronic GERD and dyspepsia      DO Keila Singer [address]  [address]    Chief Complaint   Patient presents with    Follow-up     GERD - patient requested switch of providers    Gastroesophageal Reflux     Patient states having symptoms almost daily. Taking pepcid 20mg nightly. States avoiding triggers as much as he can. 1. GERD without esophagitis    2. Abdominal pain, right upper quadrant          HISTORY OF PRESENT ILLNESS: Mr.Santos LUZ MARIA Hinojosa is a 42 y.o. male with a past history remarkable for chronic GERD, previously on PPI therapy, previously seen by Rosie Bundy, underwent upper endoscopy in September 2020 where the patient was identified to have mild gastritis. H. pylori negative. Focal areas of intestinal metaplasia were identified. Patient reports refractory symptoms and currently off PPI therapy. Referred for evaluation of refractory GERD symptoms    Non-smoker  Nondrinker  No illicit drugs  No recent abdominal surgeries  Upper endoscopy performed in September 2020  Colonoscopy performed in June 2020  Family historymother with history of colon cancer    Past Medical,Family, and Social History reviewed and does contribute to the patient presenting condition. Patient's PMH/PSH,SH,PSYCH Hx, MEDs, ALLERGIES, and ROS were all reviewed and updated in the appropriate sections.     PAST MEDICAL HISTORY:  Past Medical History:   Diagnosis Date    GERD (gastroesophageal reflux disease)     H/O cardiovascular stress test 08/2020    SMALL INFERIOR ISCHEMIA AREA    History of shingles 07/2020    Hypertension     Palpitations        Past Surgical History:   Procedure Laterality Date    CAPSULE ENDOSCOPY N/A 1/26/2021    ESOPHAGEAL CAPSULE ENDOSCOPY performed by Dionicio Agustin DO at [facility] 09/21/2020    NML CORS / NML LV FUNCTION  /  DR Pilar Garcia    COLONOSCOPY N/A 9/25/2020    COLONOSCOPY DIAGNOSTIC performed by Penelope Goodman MD at [address] [place] [address],42-10 ARTHROSCOPY Right 8/19/2021    RIGHT SHOULDER ARTHROSCOPY WITH DISTAL CLAVICLE EXCISION- S/N performed by Yon Sommer MD at [facility] N/A 6/26/2020    EGD BIOPSY performed by Penelope Goodman MD at [place] [address]  1/26/2021    EGD BIOPSY performed by Jovanna Ashraf DO at [facility]    WISDOM TOOTH EXTRACTION Bilateral        CURRENT MEDICATIONS:    Current Outpatient Medications:     trimethoprim-polymyxin b (POLYTRIM) 46226-0.1 UNIT/ML-% ophthalmic solution, , Disp: , Rfl:     prednisoLONE acetate (PRED FORTE) 1 % ophthalmic suspension, , Disp: , Rfl:     fexofenadine (ALLEGRA) 180 MG tablet, TAKE 1 TABLET DAILY, Disp: 90 tablet, Rfl: 1    ondansetron (ZOFRAN) 4 MG tablet, Take 1 tablet by mouth every 6 hours as needed for Nausea, Disp: 30 tablet, Rfl: 1    famotidine (PEPCID) 20 MG tablet, Take 20 mg by mouth nightly as needed, Disp: , Rfl:     losartan-hydroCHLOROthiazide (HYZAAR) 100-25 MG per tablet, Take 1 tablet by mouth daily, Disp: 90 tablet, Rfl: 3    ALLERGIES:   Allergies   Allergen Reactions    Ace Inhibitors      cough       FAMILY HISTORY:       Problem Relation Age of Onset    Cancer Mother     Colon Cancer Mother     High Blood Pressure Father     Stroke Father          SOCIAL HISTORY:   Social History     Socioeconomic History    Marital status:      Spouse name: Not on file    Number of children: Not on file    Years of education: Not on file    Highest education level: Not on file   Occupational History    Not on file   Tobacco Use    Smoking status: Never Smoker    Smokeless tobacco: Never Used   Vaping Use    Vaping Use: Never used   Substance and Sexual Activity    Alcohol use: No    Drug use: No    Sexual activity: Not on file   Other Topics Concern    Not on file   Social History Narrative    Not on file     Social Determinants of Health     Financial Resource Strain: Low Risk     Difficulty of Paying Living Expenses: Not hard at all   Food Insecurity: No Food Insecurity    Worried About Running Out of Food in the Last Year: Never true    [address] in the Last Year: Never true   Transportation Needs:     Lack of Transportation (Medical): Not on file    Lack of Transportation (Non-Medical): Not on file   Physical Activity:     Days of Exercise per Week: Not on file    Minutes of Exercise per Session: Not on file   Stress:     Feeling of Stress : Not on file   Social Connections:     Frequency of Communication with Friends and Family: Not on file    Frequency of Social Gatherings with Friends and Family: Not on file    Attends Jain Services: Not on file    Active Member of [address] [place] [address] or Organizations: Not on file    Attends Club or Organization Meetings: Not on file    Marital Status: Not on file   Intimate Partner Violence:     Fear of Current or Ex-Partner: Not on file    Emotionally Abused: Not on file    Physically Abused: Not on file    Sexually Abused: Not on file   Housing Stability:     Unable to Pay for Housing in the Last Year: Not on file    Number of [place] in the Last Year: Not on file    Unstable Housing in the Last Year: Not on file       REVIEW OF SYSTEMS: A 12-point review of systems was obtained and pertinent positives and negatives were listed below. REVIEW OF SYSTEMS:     Constitutional: No fever, no chills, no lethargy, no weakness. HEENT:  No headache, otalgia, itchy eyes, nasal discharge or sore throat. Cardiac:  No chest pain, dyspnea, orthopnea or PND. Chest:   No cough, phlegm or wheezing. Abdomen:      Detailed by MA   Neuro:  No focal weakness, abnormal movements or seizure like activity. Skin:   No rashes, no itching.   :   No hematuria, no pulses. Pulmonary/Chest: Effort normal and breath sounds normal. No stridor. No respiratory distress. He has no wheezes. He has no rales. He exhibits no tenderness. Abdominal: Soft. Bowel sounds are normal. He exhibits no distension and no mass. There is no tenderness. There is no rebound and no guarding. No hernia. Musculoskeletal: Normal range of motion. Lymphadenopathy:    Patient has no cervical adenopathy. Neurological: Patient is alert and oriented to person, place, and time. Psychiatric: Patient has a normal mood and affect. Patient behavior is normal.       LABORATORY DATA: Reviewed  Lab Results   Component Value Date    WBC 8.3 08/06/2021    HGB 15.2 08/06/2021    HCT 47.5 08/06/2021    MCV 92.2 08/06/2021     08/06/2021     08/06/2021    K 3.9 08/06/2021     08/06/2021    CO2 26 08/06/2021    BUN 19 08/06/2021    CREATININE 1.01 08/06/2021    LABPROT 7.4 12/22/2011    LABALBU 4.3 10/06/2020    BILITOT 0.46 10/06/2020    ALKPHOS 90 10/06/2020    AST 18 10/06/2020    ALT 22 10/06/2020         Lab Results   Component Value Date    RBC 5.15 08/06/2021    HGB 15.2 08/06/2021    MCV 92.2 08/06/2021    MCH 29.5 08/06/2021    MCHC 32.0 08/06/2021    RDW 11.9 08/06/2021    MPV 9.8 08/06/2021    BASOPCT 1.0 05/22/2020    LYMPHSABS 2.3 05/22/2020    MONOSABS 0.7 05/22/2020    NEUTROABS 5.5 05/22/2020    EOSABS 0.1 05/22/2020    BASOSABS 0.1 05/22/2020         DIAGNOSTIC TESTING:     No results found. IMPRESSION: Mr.Santos LUZ AMRIA Jeong is a 42 y.o. male with a past history remarkable for chronic GERD, previously on PPI therapy, previously seen by Sneha Smith, underwent upper endoscopy in September 2020 where the patient was identified to have mild gastritis. H. pylori negative. Focal areas of intestinal metaplasia were identified. Patient reports refractory symptoms and currently off PPI therapy. Referred for evaluation of refractory GERD symptoms      Assessment  1.  GERD without esophagitis    2. Abdominal pain, right upper quadrant        PLAN:    1) refractory GERD and dyspepsiapatient advised to avoid dietary triggers, education provided during this visit. Prilosec 20mg increase to BID. Should the patient have refractory symptoms may consider repeat endoscopic evaluation. He will need surveillance upper endoscopy with gastric mapping in 1 to 2 years given the intestinal metaplasia previously seen. 2) repeat colonoscopy in 5 years from index, 2025 given family history of colorectal cancer. 3) follow-up in 2 3 months. Thank you for allowing me to participate in the care of Mr. Waqar Velez. For any further questions please do not hesitate to contact me. I have reviewed and agree with the ROS entered by the MA/LPN from today's encounter documented in a separate note. Raul Fernandez MD, MPH   [facility]  Office #: [phone]    this note is created with the assistance of a speech recognition program.  While intending to generate a document that actually reflects the content of the visit, the document can still have some errors including those of syntax and sound a like substitutions which may escape proof reading. It such instances, actual meaning can be extrapolated by contextual diversion.

## 2021-11-10 ENCOUNTER — HOSPITAL ENCOUNTER (OUTPATIENT)
Age: 41
Discharge: HOME | End: 2021-11-10
Payer: COMMERCIAL

## 2021-11-10 DIAGNOSIS — K21.9 GERD WITHOUT ESOPHAGITIS: ICD-10-CM

## 2021-11-10 DIAGNOSIS — R10.11 ABDOMINAL PAIN, RIGHT UPPER QUADRANT: ICD-10-CM

## 2021-11-10 PROCEDURE — 83014 H PYLORI DRUG ADMIN: CPT

## 2021-11-10 PROCEDURE — 83013 H PYLORI (C-13) BREATH: CPT

## 2021-11-12 LAB — H PYLORI BREATH TEST: NEGATIVE

## 2022-01-28 ENCOUNTER — E-VISIT (OUTPATIENT)
Dept: OTHER | Facility: CLINIC | Age: 42
End: 2022-01-28
Payer: COMMERCIAL

## 2022-01-28 DIAGNOSIS — J06.9 UPPER RESPIRATORY TRACT INFECTION, UNSPECIFIED TYPE: Primary | ICD-10-CM

## 2022-01-28 PROCEDURE — 99422 OL DIG E/M SVC 11-20 MIN: CPT | Performed by: NURSE PRACTITIONER

## 2022-02-03 NOTE — PROGRESS NOTES
----- Message from Austyn Medina DO sent at 2/2/2022  9:17 PM CST -----  Please inform the patient that her recent EGD biopsies revealed the presence of reflux esophagitis, chronic gastritis, and fundic gland polyp.  She should be encouraged to continue her anti-reflux medications and lifestyle modifications as previously instructed.    Otherwise, no changes to our current management plan are indicated, and the patient is to continue to follow up as previously instructed.  Thank you.   2 ml air removed from TR band, denies any chest discomfort.  Family at bedside

## 2022-02-08 RX ORDER — OMEPRAZOLE 20 MG/1
CAPSULE, DELAYED RELEASE ORAL
Qty: 60 CAPSULE | Refills: 2 | Status: SHIPPED | OUTPATIENT
Start: 2022-02-08 | End: 2022-02-23

## 2022-02-09 ENCOUNTER — OFFICE VISIT (OUTPATIENT)
Dept: GASTROENTEROLOGY | Age: 42
End: 2022-02-09
Payer: COMMERCIAL

## 2022-02-09 VITALS — SYSTOLIC BLOOD PRESSURE: 135 MMHG | BODY MASS INDEX: 34.58 KG/M2 | WEIGHT: 255 LBS | DIASTOLIC BLOOD PRESSURE: 98 MMHG

## 2022-02-09 DIAGNOSIS — Z80.0 FAMILY HISTORY OF COLON CANCER: ICD-10-CM

## 2022-02-09 DIAGNOSIS — K21.9 GERD WITHOUT ESOPHAGITIS: Primary | ICD-10-CM

## 2022-02-09 PROCEDURE — 99213 OFFICE O/P EST LOW 20 MIN: CPT | Performed by: INTERNAL MEDICINE

## 2022-02-09 RX ORDER — OMEPRAZOLE 40 MG/1
40 CAPSULE, DELAYED RELEASE ORAL
Qty: 30 CAPSULE | Refills: 2 | Status: SHIPPED | OUTPATIENT
Start: 2022-02-09 | End: 2022-03-17 | Stop reason: SDUPTHER

## 2022-02-09 ASSESSMENT — ENCOUNTER SYMPTOMS
CONSTIPATION: 0
SORE THROAT: 1
RESPIRATORY NEGATIVE: 1
ABDOMINAL PAIN: 1
DIARRHEA: 0
ANAL BLEEDING: 0
WHEEZING: 0
EYES NEGATIVE: 1
TROUBLE SWALLOWING: 0
ABDOMINAL DISTENTION: 1
VOMITING: 0
ALLERGIC/IMMUNOLOGIC NEGATIVE: 1
COUGH: 0
CHOKING: 0
BLOOD IN STOOL: 0
BACK PAIN: 0
RECTAL PAIN: 0

## 2022-02-09 NOTE — PROGRESS NOTES
GI FOLLOW UP    INTERVAL HISTORY:       No referring provider defined for this encounter. Chief Complaint   Patient presents with    Follow-up     h pylori follow up- eradicated    Gastroesophageal Reflux     Patient taking prilosec 20mg BID. No longer taking pepcid. Patient states having burning in back of the throat and feeling like he can't clear his throat. Patient does not fully avoid dietary triggers. 1. GERD without esophagitis    2. Family history of colon cancer          HISTORY OF PRESENT ILLNESS: Mr.Santos Evelyn Moreno is a 42 y.o. male with a past history remarkable for chronic GERD, previously on PPI therapy, previously seen by Kimber Fernando, underwent upper endoscopy in September 2020 where the patient was identified to have mild gastritis. H. pylori negative. Focal areas of intestinal metaplasia were identified. Patient reports refractory symptoms and currently off PPI therapy. Referred for evaluation of refractory GERD symptoms     Non-smoker  Nondrinker  No illicit drugs  No recent abdominal surgeries  Upper endoscopy performed in September 2020  Colonoscopy performed in June 2020  Family historymother with history of colon cancer      Past Medical,Family, and Social History reviewed and does contribute to the patient presenting condition. Patient's PMH/PSH,SH,PSYCH Hx, MEDs, ALLERGIES, and ROS were all reviewed and updated in the appropriate sections.     PAST MEDICAL HISTORY:  Past Medical History:   Diagnosis Date    GERD (gastroesophageal reflux disease)     H/O cardiovascular stress test 08/2020    SMALL INFERIOR ISCHEMIA AREA    History of shingles 07/2020    Hypertension     Palpitations        Past Surgical History:   Procedure Laterality Date    CAPSULE ENDOSCOPY N/A 1/26/2021    ESOPHAGEAL CAPSULE ENDOSCOPY performed by Carter Nagy DO at [facility]  CARDIAC CATHETERIZATION  09/21/2020    NML CORS / NML LV FUNCTION  /  DR Tamela Kussmaul    COLONOSCOPY N/A 9/25/2020    COLONOSCOPY DIAGNOSTIC performed by Roe Rodriguez MD at [address] [place] [address],42-10 ARTHROSCOPY Right 8/19/2021    RIGHT SHOULDER ARTHROSCOPY WITH DISTAL CLAVICLE EXCISION- S/N performed by Any Beverly MD at [facility] N/A 6/26/2020    EGD BIOPSY performed by Roe Rodriguez MD at [place] [address]  1/26/2021    EGD BIOPSY performed by Valery Pickens DO at [facility]    WISDOM TOOTH EXTRACTION Bilateral        CURRENT MEDICATIONS:    Current Outpatient Medications:     omeprazole (PRILOSEC) 20 MG delayed release capsule, TAKE 1 CAPSULE BY MOUTH TWICE DAILY, Disp: 60 capsule, Rfl: 2    fexofenadine (ALLEGRA) 180 MG tablet, TAKE 1 TABLET DAILY, Disp: 90 tablet, Rfl: 1    losartan-hydroCHLOROthiazide (HYZAAR) 100-25 MG per tablet, Take 1 tablet by mouth daily, Disp: 90 tablet, Rfl: 3    trimethoprim-polymyxin b (POLYTRIM) 43076-1.1 UNIT/ML-% ophthalmic solution, , Disp: , Rfl:     prednisoLONE acetate (PRED FORTE) 1 % ophthalmic suspension, , Disp: , Rfl:     ondansetron (ZOFRAN) 4 MG tablet, Take 1 tablet by mouth every 6 hours as needed for Nausea, Disp: 30 tablet, Rfl: 1    ALLERGIES:   Allergies   Allergen Reactions    Ace Inhibitors      cough       FAMILY HISTORY:       Problem Relation Age of Onset    Cancer Mother     Colon Cancer Mother     High Blood Pressure Father     Stroke Father          SOCIAL HISTORY:   Social History     Socioeconomic History    Marital status:      Spouse name: Not on file    Number of children: Not on file    Years of education: Not on file    Highest education level: Not on file   Occupational History    Not on file   Tobacco Use    Smoking status: Never Smoker    Smokeless tobacco: Never Used   Vaping Use    Vaping Use: Never used   Substance and Sexual Activity    Alcohol use: No    Drug use: No    Sexual activity: Not on file   Other Topics Concern    Not on file   Social History Narrative    Not on file     Social Determinants of Health     Financial Resource Strain: Low Risk     Difficulty of Paying Living Expenses: Not hard at all   Food Insecurity: No Food Insecurity    Worried About Running Out of Food in the Last Year: Never true    [address] in the Last Year: Never true   Transportation Needs:     Lack of Transportation (Medical): Not on file    Lack of Transportation (Non-Medical): Not on file   Physical Activity:     Days of Exercise per Week: Not on file    Minutes of Exercise per Session: Not on file   Stress:     Feeling of Stress : Not on file   Social Connections:     Frequency of Communication with Friends and Family: Not on file    Frequency of Social Gatherings with Friends and Family: Not on file    Attends Muslim Services: Not on file    Active Member of [address] [place] [address] or Organizations: Not on file    Attends Club or Organization Meetings: Not on file    Marital Status: Not on file   Intimate Partner Violence:     Fear of Current or Ex-Partner: Not on file    Emotionally Abused: Not on file    Physically Abused: Not on file    Sexually Abused: Not on file   Housing Stability:     Unable to Pay for Housing in the Last Year: Not on file    Number of [place] in the Last Year: Not on file    Unstable Housing in the Last Year: Not on file       REVIEW OF SYSTEMS: A 12-point review of systems was obtained and pertinent positives and negatives were listed below. REVIEW OF SYSTEMS:     Constitutional: No fever, no chills, no lethargy, no weakness. HEENT:  No headache, otalgia, itchy eyes, nasal discharge or sore throat. Cardiac:  No chest pain, dyspnea, orthopnea or PND. Chest:   No cough, phlegm or wheezing.   Abdomen:      Detailed by MA   Neuro:  No focal weakness, abnormal movements or seizure like activity. Skin:   No rashes, no itching. :   No hematuria, no pyuria, no dysuria, no flank pain. Extremities:  No swelling or joint pains. ROS was otherwise negative    Review of Systems   Constitutional: Negative. Negative for appetite change (decrease), fatigue and unexpected weight change. HENT: Positive for sore throat (better but after eating). Negative for trouble swallowing. Eyes: Negative. Respiratory: Negative. Negative for cough, choking and wheezing. Cardiovascular: Positive for chest pain and palpitations. Negative for leg swelling. Gastrointestinal: Positive for abdominal distention and abdominal pain (ULQ). Negative for anal bleeding, blood in stool, constipation, diarrhea, rectal pain and vomiting. Heartburn, acid in mouth   Endocrine: Negative. Genitourinary: Negative. Negative for difficulty urinating. Musculoskeletal: Negative. Negative for arthralgias, back pain and gait problem. Skin: Negative. Allergic/Immunologic: Negative. Negative for environmental allergies and food allergies. Neurological: Negative. Negative for dizziness, weakness, light-headedness, numbness and headaches. Hematological: Negative. Does not bruise/bleed easily. Psychiatric/Behavioral: Negative. Negative for sleep disturbance. The patient is not nervous/anxious. All other systems reviewed and are negative. PHYSICAL EXAMINATION: Vital signs reviewed per the nursing documentation. BP (!) 134/91   Wt 255 lb (115.7 kg)   BMI 34.58 kg/m²   Body mass index is 34.58 kg/m². Physical Exam    Physical Exam   Constitutional: Patient is oriented to person, place, and time. Patient appears well-developed and well-nourished. HENT:   Head: Normocephalic and atraumatic. Eyes: Pupils are equal, round, and reactive to light. EOM are normal.   Neck: Normal range of motion. Neck supple. No JVD present. No tracheal deviation present. No thyromegaly present.    Cardiovascular: Normal rate, regular rhythm, normal heart sounds and intact distal pulses. Pulmonary/Chest: Effort normal and breath sounds normal. No stridor. No respiratory distress. He has no wheezes. He has no rales. He exhibits no tenderness. Abdominal: Soft. Bowel sounds are normal. He exhibits no distension and no mass. There is no tenderness. There is no rebound and no guarding. No hernia. Musculoskeletal: Normal range of motion. Lymphadenopathy:    Patient has no cervical adenopathy. Neurological: Patient is alert and oriented to person, place, and time. Psychiatric: Patient has a normal mood and affect. Patient behavior is normal.       LABORATORY DATA: Reviewed  Lab Results   Component Value Date    WBC 8.3 08/06/2021    HGB 15.2 08/06/2021    HCT 47.5 08/06/2021    MCV 92.2 08/06/2021     08/06/2021     08/06/2021    K 3.9 08/06/2021     08/06/2021    CO2 26 08/06/2021    BUN 19 08/06/2021    CREATININE 1.01 08/06/2021    LABPROT 7.4 12/22/2011    LABALBU 4.3 10/06/2020    BILITOT 0.46 10/06/2020    ALKPHOS 90 10/06/2020    AST 18 10/06/2020    ALT 22 10/06/2020         Lab Results   Component Value Date    RBC 5.15 08/06/2021    HGB 15.2 08/06/2021    MCV 92.2 08/06/2021    MCH 29.5 08/06/2021    MCHC 32.0 08/06/2021    RDW 11.9 08/06/2021    MPV 9.8 08/06/2021    BASOPCT 1.0 05/22/2020    LYMPHSABS 2.3 05/22/2020    MONOSABS 0.7 05/22/2020    NEUTROABS 5.5 05/22/2020    EOSABS 0.1 05/22/2020    BASOSABS 0.1 05/22/2020         DIAGNOSTIC TESTING:     No results found. IMPRESSION: Mr.Santos LUZ MARIA Urias is a 42 y.o. male with a past history remarkable for chronic GERD, previously on PPI therapy, previously seen by Maricel Oropeza, underwent upper endoscopy in September 2020 where the patient was identified to have mild gastritis. H. pylori negative. Focal areas of intestinal metaplasia were identified. Patient reports refractory symptoms and currently off PPI therapy.   Referred for evaluation of refractory GERD symptoms      Assessment  1. GERD without esophagitis    2. Family history of colon cancer        PLAN:    1) mild intermittent dyspepsia and dysphagia symptomscurrently on Prilosec 20mg BID. Increase Prilosec to 40mg in AM.  Continue with Prilosec 20 mg in evening. We'll plan for repeat upper endoscopy to evaluate for gastric mapping and to obtain esophageal biopsies to evaluate for eosinophilic esophagitis. 2) strongly advised patient to avoid dietary triggers    Thank you for allowing me to participate in the care of Mr. Sydni Doyle. For any further questions please do not hesitate to contact me. I have reviewed and agree with the ROS entered by the MA/LPN from today's encounter documented in a separate note. Alisson Morrell MD, MPH   [facility]  Office #: [phone]    this note is created with the assistance of a speech recognition program.  While intending to generate a document that actually reflects the content of the visit, the document can still have some errors including those of syntax and sound a like substitutions which may escape proof reading. It such instances, actual meaning can be extrapolated by contextual diversion.

## 2022-02-22 ENCOUNTER — TELEPHONE (OUTPATIENT)
Dept: GASTROENTEROLOGY | Age: 42
End: 2022-02-22

## 2022-02-22 ENCOUNTER — ANESTHESIA EVENT (OUTPATIENT)
Dept: OPERATING ROOM | Age: 42
End: 2022-02-22
Payer: COMMERCIAL

## 2022-02-22 DIAGNOSIS — K21.9 GERD WITHOUT ESOPHAGITIS: Primary | ICD-10-CM

## 2022-02-22 NOTE — TELEPHONE ENCOUNTER
Writer called patient to confirm procedure for inge [email or web address]@[employer], patient is coming to procedure. Patient had questions regarding medication, patient stated Dr Kai Goldmann change dose to 40mg of Omerazole in the morning and continue 20mg in the afternoon but [facility] is confuse by the dose change and refuse to give him the 20mg for the evening. Patient asking to please clarify with pharmacy.

## 2022-02-23 RX ORDER — OMEPRAZOLE 20 MG/1
20 CAPSULE, DELAYED RELEASE ORAL NIGHTLY
Qty: 30 CAPSULE | Refills: 3 | Status: SHIPPED | OUTPATIENT
Start: 2022-02-23 | End: 2022-03-17 | Stop reason: SDUPTHER

## 2022-02-23 NOTE — TELEPHONE ENCOUNTER
Writer called pharmacy. They are requesting a new script be sent for the 20mg prilosec. The previous script was sent in for BID and needs to be changed to once at night. Writer sending new script per physician.

## 2022-02-24 ENCOUNTER — ANESTHESIA (OUTPATIENT)
Dept: OPERATING ROOM | Age: 42
End: 2022-02-24
Payer: COMMERCIAL

## 2022-02-24 ENCOUNTER — HOSPITAL ENCOUNTER (OUTPATIENT)
Age: 42
Setting detail: OUTPATIENT SURGERY
Discharge: HOME | End: 2022-02-24
Attending: INTERNAL MEDICINE | Admitting: INTERNAL MEDICINE
Payer: COMMERCIAL

## 2022-02-24 VITALS
TEMPERATURE: 97.2 F | BODY MASS INDEX: 35.76 KG/M2 | RESPIRATION RATE: 12 BRPM | OXYGEN SATURATION: 97 % | WEIGHT: 264 LBS | HEART RATE: 71 BPM | SYSTOLIC BLOOD PRESSURE: 128 MMHG | DIASTOLIC BLOOD PRESSURE: 98 MMHG | HEIGHT: 72 IN

## 2022-02-24 VITALS
RESPIRATION RATE: 18 BRPM | SYSTOLIC BLOOD PRESSURE: 139 MMHG | OXYGEN SATURATION: 97 % | DIASTOLIC BLOOD PRESSURE: 89 MMHG

## 2022-02-24 PROCEDURE — 88305 TISSUE EXAM BY PATHOLOGIST: CPT

## 2022-02-24 PROCEDURE — 3700000000 HC ANESTHESIA ATTENDED CARE: Performed by: INTERNAL MEDICINE

## 2022-02-24 PROCEDURE — 3609012400 HC EGD TRANSORAL BIOPSY SINGLE/MULTIPLE: Performed by: INTERNAL MEDICINE

## 2022-02-24 PROCEDURE — 6360000002 HC RX W HCPCS: Performed by: NURSE ANESTHETIST, CERTIFIED REGISTERED

## 2022-02-24 PROCEDURE — 2500000003 HC RX 250 WO HCPCS: Performed by: NURSE ANESTHETIST, CERTIFIED REGISTERED

## 2022-02-24 PROCEDURE — 7100000011 HC PHASE II RECOVERY - ADDTL 15 MIN: Performed by: INTERNAL MEDICINE

## 2022-02-24 PROCEDURE — 2580000003 HC RX 258: Performed by: ANESTHESIOLOGY

## 2022-02-24 PROCEDURE — 2709999900 HC NON-CHARGEABLE SUPPLY: Performed by: INTERNAL MEDICINE

## 2022-02-24 PROCEDURE — 3700000001 HC ADD 15 MINUTES (ANESTHESIA): Performed by: INTERNAL MEDICINE

## 2022-02-24 PROCEDURE — 7100000010 HC PHASE II RECOVERY - FIRST 15 MIN: Performed by: INTERNAL MEDICINE

## 2022-02-24 PROCEDURE — 43239 EGD BIOPSY SINGLE/MULTIPLE: CPT | Performed by: INTERNAL MEDICINE

## 2022-02-24 PROCEDURE — 88342 IMHCHEM/IMCYTCHM 1ST ANTB: CPT

## 2022-02-24 RX ORDER — SODIUM CHLORIDE 0.9 % (FLUSH) 0.9 %
10 SYRINGE (ML) INJECTION EVERY 12 HOURS SCHEDULED
Status: DISCONTINUED | OUTPATIENT
Start: 2022-02-24 | End: 2022-02-24 | Stop reason: HOSPADM

## 2022-02-24 RX ORDER — DIPHENHYDRAMINE HYDROCHLORIDE 50 MG/ML
12.5 INJECTION INTRAMUSCULAR; INTRAVENOUS
Status: DISCONTINUED | OUTPATIENT
Start: 2022-02-24 | End: 2022-02-24 | Stop reason: HOSPADM

## 2022-02-24 RX ORDER — MORPHINE SULFATE 1 MG/ML
1 INJECTION, SOLUTION EPIDURAL; INTRATHECAL; INTRAVENOUS EVERY 5 MIN PRN
Status: DISCONTINUED | OUTPATIENT
Start: 2022-02-24 | End: 2022-02-24 | Stop reason: HOSPADM

## 2022-02-24 RX ORDER — SODIUM CHLORIDE 0.9 % (FLUSH) 0.9 %
5-40 SYRINGE (ML) INJECTION PRN
Status: DISCONTINUED | OUTPATIENT
Start: 2022-02-24 | End: 2022-02-24 | Stop reason: HOSPADM

## 2022-02-24 RX ORDER — SODIUM CHLORIDE 9 MG/ML
25 INJECTION, SOLUTION INTRAVENOUS PRN
Status: DISCONTINUED | OUTPATIENT
Start: 2022-02-24 | End: 2022-02-24 | Stop reason: HOSPADM

## 2022-02-24 RX ORDER — SODIUM CHLORIDE 0.9 % (FLUSH) 0.9 %
10 SYRINGE (ML) INJECTION PRN
Status: DISCONTINUED | OUTPATIENT
Start: 2022-02-24 | End: 2022-02-24 | Stop reason: HOSPADM

## 2022-02-24 RX ORDER — ONDANSETRON 2 MG/ML
4 INJECTION INTRAMUSCULAR; INTRAVENOUS
Status: DISCONTINUED | OUTPATIENT
Start: 2022-02-24 | End: 2022-02-24 | Stop reason: HOSPADM

## 2022-02-24 RX ORDER — LIDOCAINE HYDROCHLORIDE 10 MG/ML
INJECTION, SOLUTION EPIDURAL; INFILTRATION; INTRACAUDAL; PERINEURAL PRN
Status: DISCONTINUED | OUTPATIENT
Start: 2022-02-24 | End: 2022-02-24 | Stop reason: SDUPTHER

## 2022-02-24 RX ORDER — SODIUM CHLORIDE, SODIUM LACTATE, POTASSIUM CHLORIDE, CALCIUM CHLORIDE 600; 310; 30; 20 MG/100ML; MG/100ML; MG/100ML; MG/100ML
INJECTION, SOLUTION INTRAVENOUS CONTINUOUS
Status: DISCONTINUED | OUTPATIENT
Start: 2022-02-24 | End: 2022-02-24 | Stop reason: HOSPADM

## 2022-02-24 RX ORDER — SODIUM CHLORIDE 0.9 % (FLUSH) 0.9 %
5-40 SYRINGE (ML) INJECTION EVERY 12 HOURS SCHEDULED
Status: DISCONTINUED | OUTPATIENT
Start: 2022-02-24 | End: 2022-02-24 | Stop reason: HOSPADM

## 2022-02-24 RX ORDER — MEPERIDINE HYDROCHLORIDE 50 MG/ML
12.5 INJECTION INTRAMUSCULAR; INTRAVENOUS; SUBCUTANEOUS EVERY 5 MIN PRN
Status: DISCONTINUED | OUTPATIENT
Start: 2022-02-24 | End: 2022-02-24 | Stop reason: HOSPADM

## 2022-02-24 RX ORDER — SODIUM CHLORIDE 9 MG/ML
INJECTION, SOLUTION INTRAVENOUS CONTINUOUS
Status: DISCONTINUED | OUTPATIENT
Start: 2022-02-24 | End: 2022-02-24 | Stop reason: HOSPADM

## 2022-02-24 RX ORDER — PROPOFOL 10 MG/ML
INJECTION, EMULSION INTRAVENOUS PRN
Status: DISCONTINUED | OUTPATIENT
Start: 2022-02-24 | End: 2022-02-24 | Stop reason: SDUPTHER

## 2022-02-24 RX ADMIN — LIDOCAINE HYDROCHLORIDE 100 MG: 10 INJECTION, SOLUTION EPIDURAL; INFILTRATION; INTRACAUDAL; PERINEURAL at 08:36

## 2022-02-24 RX ADMIN — PROPOFOL 200 MG: 10 INJECTION, EMULSION INTRAVENOUS at 08:36

## 2022-02-24 RX ADMIN — SODIUM CHLORIDE, POTASSIUM CHLORIDE, SODIUM LACTATE AND CALCIUM CHLORIDE: 600; 310; 30; 20 INJECTION, SOLUTION INTRAVENOUS at 08:31

## 2022-02-24 ASSESSMENT — PULMONARY FUNCTION TESTS
PIF_VALUE: 0
PIF_VALUE: 1
PIF_VALUE: 0
PIF_VALUE: 2
PIF_VALUE: 1

## 2022-02-24 NOTE — ANESTHESIA POSTPROCEDURE EVALUATION
POST- ANESTHESIA EVALUATION       Pt Name: Lucie Lenz  MRN: [id number]  YOB: 1980  Date of evaluation: 2/24/2022  Time:  9:45 AM      BP (!) 128/98   Pulse 71   Temp 97.2 °F (36.2 °C) (Temporal)   Resp 12   Ht 6' (1.829 m)   Wt 264 lb (119.7 kg)   SpO2 97%   BMI 35.80 kg/m²      Consciousness Level  Awake  Cardiopulmonary Status  Stable  Pain Adequately Treated YES  Nausea / Vomiting  NO  Adequate Hydration  YES  Anesthesia Related Complications NONE      Electronically signed by Buddy Bryan MD on 2/24/2022 at 9:45 AM       Department of Anesthesiology  Postprocedure Note    Patient: Lucie Lenz  MRN: [id number]  YOB: 1980  Date of evaluation: 2/24/2022  Time:  9:45 AM     Procedure Summary     Date: 02/24/22 Room / Location: [address]    Anesthesia Start: 0830 Anesthesia Stop: 9403    Procedure: EGD BIOPSY (N/A ) Diagnosis: (DYSPEPSIA)    Surgeons: Kris Conroy MD Responsible Provider: Buddy Bryan MD    Anesthesia Type: MAC ASA Status: 2          Anesthesia Type: MAC    Pako Phase I: Pako Score: 10    Pako Phase II: Pako Score: 10    Last vitals: Reviewed and per EMR flowsheets.        Anesthesia Post Evaluation

## 2022-02-24 NOTE — OP NOTE
Operative Note      Patient: Srinivas Toussaint  YOB: 1980  MRN: [id number]    Date of Procedure: 2/24/2022    Pre-Op Diagnosis: DYSPEPSIA    Post-Op Diagnosis: Mild gastritis. Procedure(s):  EGD BIOPSY    Surgeon(s):  Vicki Lyn MD    Assistant:   * No surgical staff found *    Anesthesia: Monitor Anesthesia Care    Estimated Blood Loss (mL): Minimal    Complications: None    Specimens:   ID Type Source Tests Collected by Time Destination   A : STOMACH BIOPSY Tissue Stomach SURGICAL PATHOLOGY Vicki Lyn MD 2/24/2022 7149    B : ESOPHAGEAL BIOPSY Tissue Esophagus SURGICAL PATHOLOGY Vicki Lyn MD 2/24/2022 9268        Implants:  * No implants in log *      Drains: * No LDAs found *          [facility]    [facility]    EGD    PROCEDURE DATE: 02/24/22    REFERRING PHYSICIAN: No ref. provider found     PRIMARY CARE PROVIDER: Yara Morataya DO    ATTENDING PHYSICIAN: Vicki Lyn MD     HISTORY: Mr. Srinivas Toussaint is a 36 y.o. male who presents to the  Endoscopy unit for upper endoscopy. The patient's clinical history is remarkable for HTN, GERD, recurrent dyspepsia symptoms, referred for diagnostic EGD. He is currently medically stable and appropriate for the planned procedure. PREOPERATIVE DIAGNOSIS: Dyspepsia and GERD    PROCEDURES:   1) Transoral Upper Endoscopy with cold biopsy     POSTOPERATIVE DIAGNOSIS:     1) No esophagitis, no strictures, no esophageal luminal narrowing. Normal appearing mucosa. Normal appearing GEJ. 2) Mild non-specific gastritis, no ulcerations, no erosions. 3) Normal appearing duodenal mucosa. 4) Gastric and esophageal mucosal cold biopsies taken. MEDICATIONS:   MAC per anesthesia     EBL:  <10cc    INSTRUMENT: Olympus GIF-H190  flexible Gastroscope.      PREPARATION: The nature and character of the procedure as well as risks, benefits, and alternatives were discussed with the patient and informed consent was obtained. Complications were said to include, but were not limited to: medication allergy, medication reaction, cardiovascular and respiratory problems, bleeding, perforation, infection, and/or missed diagnosis. Following arrival in the endoscopy room, the patient was placed in the left lateral decubitus position and final time-out accomplished in the presence of the nursing staff. Baseline vital signs were obtained and reviewed, and IV sedation was subsequently initiated. FINDINGS:   Esophagus: The esophagus was inspected to the Z-line. The endoscopic exam showed normal appearing esophageal mucosa. Stomach: The stomach was inspected in both forward and retroflex fashion and was appropriately distensible. The cardia, fundus, incisura, antrum and pylorus were identified via direct visualization. The endoscopic exam showed mild gastritis. Duodenum: The proximal small bowel was inspected through the bulb, sweep, and second portion of the duodenum. The endoscopic exam showed normal appearing. IMPRESSION:    1) No esophagitis, no strictures, no esophageal luminal narrowing. Normal appearing mucosa. Normal appearing GEJ. 2) Mild non-specific gastritis, no ulcerations, no erosions. 3) Normal appearing duodenal mucosa. 4) Gastric and esophageal mucosal cold biopsies taken. RECOMMENDATIONS:   1) Follow up path in GI clinic  2) Continue with PPI 40mg in AM and 20mg in evening. Continue anti-reflux lifestyle. Alisson Morrell MD  [facility]   02/24/22    this note is created with the assistance of a speech recognition program.  While intending to generate a document that actually reflects the content of the visit, the document can still have some errors including those of syntax and sound a like substitutions which may escape proof reading. It such instances, actual meaning can be extrapolated by contextual diversion.     The patient was counseled at length about the risks of jackeline Covid-19 during their perioperative period and any recovery window from their procedure. The patient was made aware that jackeline Covid-19  may worsen their prognosis for recovering from their procedure  and lend to a higher morbidity and/or mortality risk. All material risks, benefits, and reasonable alternatives including postponing the procedure were discussed. The patient DOES wish to proceed with the procedure at this time.     Electronically signed by Sussy Ledezma MD on 2/24/2022 at 8:45 AM

## 2022-02-24 NOTE — ANESTHESIA PRE PROCEDURE
Department of Anesthesiology  Preprocedure Note       Name:  Amber Bañuelos   Age:  36 y.o.  :  1980                                          MRN:  [id number]         Date:  2022      Surgeon: Manfred Herzog):  Niranjan Quintero MD    Procedure: Procedure(s):  EGD BIOPSY    Medications prior to admission:   Prior to Admission medications    Medication Sig Start Date End Date Taking? Authorizing Provider   omeprazole (PRILOSEC) 20 MG delayed release capsule Take 1 capsule by mouth at bedtime 22   Niranjan Quintero MD   omeprazole (PRILOSEC) 40 MG delayed release capsule Take 1 capsule by mouth every morning (before breakfast) 22   Niranjan Quintero MD   trimethoprim-polymyxin b (POLYTRIM) 82195-3.1 UNIT/ML-% ophthalmic solution  9/10/21   Historical Provider, MD   prednisoLONE acetate (PRED FORTE) 1 % ophthalmic suspension  21   Historical Provider, MD   fexofenadine (ALLEGRA) 180 MG tablet TAKE 1 TABLET DAILY 10/6/21   Manuel Alanis DO   ondansetron [facility]) 4 MG tablet Take 1 tablet by mouth every 6 hours as needed for Nausea 21   Dora Gaitan MD   losartan-hydroCHLOROthiazide [facility]) 100-25 MG per tablet Take 1 tablet by mouth daily 5/10/21   Manuel Alanis DO       Current medications:    No current facility-administered medications for this encounter. Allergies:     Allergies   Allergen Reactions    Ace Inhibitors      cough       Problem List:    Patient Active Problem List   Diagnosis Code    Essential hypertension I10    Abdominal pain, right upper quadrant R10.11    Family history of colon cancer Z80.0    GERD without esophagitis K21.9       Past Medical History:        Diagnosis Date    GERD (gastroesophageal reflux disease)     H/O cardiovascular stress test 2020    SMALL INFERIOR ISCHEMIA AREA    History of shingles 2020    Hypertension     Palpitations        Past Surgical History:        Procedure Laterality Date    CAPSULE ENDOSCOPY N/A 2021 ESOPHAGEAL CAPSULE ENDOSCOPY performed by Niki Spaulding DO at [facility]    CARDIAC CATHETERIZATION  09/21/2020    NML CORS / [address] COON FUNCTION  /  DR Florence Root    COLONOSCOPY N/A 9/25/2020    COLONOSCOPY DIAGNOSTIC performed by Win Fung MD at [address] [place] [address],[id number] ARTHROSCOPY Right 8/19/2021    RIGHT SHOULDER ARTHROSCOPY WITH DISTAL CLAVICLE EXCISION- S/N performed by Mary Rawls MD at [address] [place] [address] 6/26/2020    EGD BIOPSY performed by Win Fung MD at [address] [place] [address]  1/26/2021    EGD BIOPSY performed by Niki Spaulding DO at [facility]    WISDOM TOOTH EXTRACTION Bilateral        Social History:    Social History     Tobacco Use    Smoking status: Never Smoker    Smokeless tobacco: Never Used   Substance Use Topics    Alcohol use: No                                Counseling given: Not Answered      Vital Signs (Current): There were no vitals filed for this visit.                                            BP Readings from Last 3 Encounters:   02/09/22 (!) 135/98   11/09/21 132/88   10/15/21 132/89       NPO Status:                                                                                 BMI:   Wt Readings from Last 3 Encounters:   02/09/22 255 lb (115.7 kg)   11/09/21 256 lb (116.1 kg)   10/15/21 235 lb (106.6 kg)     There is no height or weight on file to calculate BMI.    CBC:   Lab Results   Component Value Date    WBC 8.3 08/06/2021    RBC 5.15 08/06/2021    RBC 5.06 12/22/2011    HGB 15.2 08/06/2021    HCT 47.5 08/06/2021    MCV 92.2 08/06/2021    RDW 11.9 08/06/2021     08/06/2021     12/22/2011       CMP:   Lab Results   Component Value Date     08/06/2021    K 3.9 08/06/2021     08/06/2021    CO2 26 08/06/2021    BUN 19 08/06/2021    CREATININE 1.01 08/06/2021    GFRAA >60 08/06/2021    LABGLOM >60 08/06/2021    GLUCOSE 101 10/06/2020    GLUCOSE 108 12/22/2011 PROT 7.2 10/06/2020    CALCIUM 9.3 10/06/2020    BILITOT 0.46 10/06/2020    ALKPHOS 90 10/06/2020    AST 18 10/06/2020    ALT 22 10/06/2020       POC Tests: No results for input(s): POCGLU, POCNA, POCK, POCCL, POCBUN, POCHEMO, POCHCT in the last 72 hours. Coags: No results found for: PROTIME, INR, APTT    HCG (If Applicable): No results found for: PREGTESTUR, PREGSERUM, HCG, HCGQUANT     ABGs: No results found for: PHART, PO2ART, FAP1QAK, HMB5YNB, BEART, U6KUNWBQ     Type & Screen (If Applicable):  No results found for: LABABO, LABRH    Drug/Infectious Status (If Applicable):  No results found for: HIV, HEPCAB    COVID-19 Screening (If Applicable):   Lab Results   Component Value Date    COVID19 Not Detected 01/22/2021    COVID19 Not Detected 09/21/2020    COVID19 Not Detected 06/22/2020           Anesthesia Evaluation  Patient summary reviewed and Nursing notes reviewed no history of anesthetic complications:   Airway: Mallampati: III  TM distance: >3 FB   Neck ROM: full  Mouth opening: > = 3 FB Dental: normal exam         Pulmonary:Negative Pulmonary ROS and normal exam  breath sounds clear to auscultation                             Cardiovascular:    (+) hypertension: no interval change,         Rhythm: regular  Rate: normal                    Neuro/Psych:   Negative Neuro/Psych ROS              GI/Hepatic/Renal:   (+) GERD: no interval change,          ROS comment: DYSPEPSIA. Endo/Other: Negative Endo/Other ROS                    Abdominal:       Abdomen: soft. Vascular: negative vascular ROS. Other Findings:           Anesthesia Plan      MAC     ASA 2             Anesthetic plan and risks discussed with patient. Plan discussed with CRNA.                   Viji Mercer MD   2/24/2022

## 2022-02-24 NOTE — H&P
Procedure History and Physical    Pre-Procedural Diagnosis:  Dyspepsia and GERD, mild dysphagia     Indications:  same    Procedure Planned: endoscopy     History Obtained From:  patient    HISTORY OF PRESENT ILLNESS:       The patient is a 36 y.o. male who presents for the above procedure.         Past Medical History:    Past Medical History:   Diagnosis Date    GERD (gastroesophageal reflux disease)     H/O cardiovascular stress test 08/2020    SMALL INFERIOR ISCHEMIA AREA    History of shingles 07/2020    Hypertension     Palpitations        Past Surgical History:    Past Surgical History:   Procedure Laterality Date    CAPSULE ENDOSCOPY N/A 1/26/2021    ESOPHAGEAL CAPSULE ENDOSCOPY performed by Germaine Shields DO at [address] [place] [address]  09/21/2020    NML CORS / NML LV FUNCTION  /  DR Jiang Cambric COLONOSCOPY N/A 9/25/2020    COLONOSCOPY DIAGNOSTIC performed by Ascencion Brown MD at [address] [place] [address],[place] [address] ARTHROSCOPY Right 8/19/2021    RIGHT SHOULDER ARTHROSCOPY WITH DISTAL CLAVICLE EXCISION- S/N performed by Keith Hussein MD at [address] [place] [address] 6/26/2020    EGD BIOPSY performed by Ascencion Brown MD at [address] [place] [address]  1/26/2021    EGD BIOPSY performed by Germaine Shields DO at [facility]    WISDOM TOOTH EXTRACTION Bilateral        Medications:  Current Facility-Administered Medications   Medication Dose Route Frequency Provider Last Rate Last Admin    0.9 % sodium chloride infusion   IntraVENous Continuous Tripp Harris MD        lactated ringers infusion   IntraVENous Continuous Tripp Harris MD        sodium chloride flush 0.9 % injection 10 mL  10 mL IntraVENous 2 times per day Tripp Harris MD        sodium chloride flush 0.9 % injection 10 mL  10 mL IntraVENous PRN Tripp Harris MD        0.9 % sodium chloride infusion  25 mL IntraVENous PRN Tripp Harris MD        sodium chloride flush 0.9 % injection 5-40 mL  5-40 mL IntraVENous 2 times per day Marcus Magallon MD        sodium chloride flush 0.9 % injection 5-40 mL  5-40 mL IntraVENous PRN Marcus Magallon, MD        0.9 % sodium chloride infusion  25 mL IntraVENous PRN Marcus Drain, MD        meperidine (DEMEROL) injection 12.5 mg  12.5 mg IntraVENous Q5 Min PRN Jalaine Drain, MD        morphine (PF) injection 1 mg  1 mg IntraVENous Q5 Min PRN Jalaine Drain, MD        HYDROmorphone (DILAUDID) injection 0.5 mg  0.5 mg IntraVENous Q5 Min PRN Jalaine Drain, MD        ondansetron [facility]) injection 4 mg  4 mg IntraVENous Once PRN Jalaine Drain, MD        diphenhydrAMINE (BENADRYL) injection 12.5 mg  12.5 mg IntraVENous Once PRN Jalaine Drain, MD           Allergies: Allergies   Allergen Reactions    Ace Inhibitors      cough                 Social   Social History     Tobacco Use    Smoking status: Never Smoker    Smokeless tobacco: Never Used   Substance Use Topics    Alcohol use: No        PSYCH HISTORY:  Depression No  Anxiety No  Suicide No       Family History   Problem Relation Age of Onset    Cancer Mother     Colon Cancer Mother     High Blood Pressure Father     Stroke Father       No family history of colon cancer, Crohn's disease, or ulcerative colitis    Problems with Sedation/Anesthesia in the past? no    REVIEW OF SYSTEMS:  12 point review of systems negative other than mentioned above.       PHYSICAL EXAM:    Vitals:  BP (!) 148/90   Pulse 77   Temp 98 °F (36.7 °C)   Resp 16   Ht 6' (1.829 m)   Wt 264 lb (119.7 kg)   SpO2 99%   BMI 35.80 kg/m²     Focused Exam related to procedure:    General appearance: NAD, conversant   Eyes: anicteric sclerae, moist conjunctivae; no lid-lag; PERRLA   Lungs: CTA, with normal respiratory effort and no intercostal retractions   CV: RRR, no MRGs   Abdomen: Soft, non-tender; no masses or HSM   Skin: Normal temperature, turgor and texture; no rash, ulcers or subcutaneous nodules     DATA:  CBC:   Lab Results   Component Value Date    WBC 8.3 08/06/2021    HGB 15.2 08/06/2021    HCT 47.5 08/06/2021    MCV 92.2 08/06/2021     08/06/2021     BUN/Cr:   Lab Results   Component Value Date    BUN 19 08/06/2021   ,   Lab Results   Component Value Date    CREATININE 1.01 08/06/2021     Potassium:   Lab Results   Component Value Date    K 3.9 08/06/2021     PT/INR: No results found for: INR, PROTIME    ASSESSMENT AND PLAN:       1. Patient is a 36 y.o. male with above specified procedure planned. Expected Sedation/Anesthesia Type: MAC    2. ASA ([address] Anesthesiology) Anesthesia Status: Class 2 - A normal healthy patient with mild systemic disease    3. Mallampati: II (soft palate, uvula, fauces visible)  4. Procedure options, risks and benefits reviewed with Patient. Patient expresses understanding.     5.  Consent has been signed:  Yes    Gabe Kirk MD

## 2022-02-28 LAB — SURGICAL PATHOLOGY REPORT: NORMAL

## 2022-03-14 RX ORDER — AMOXICILLIN AND CLAVULANATE POTASSIUM 875; 125 MG/1; MG/1
1 TABLET, FILM COATED ORAL 2 TIMES DAILY
Qty: 20 TABLET | Refills: 0 | Status: SHIPPED | OUTPATIENT
Start: 2022-03-14 | End: 2022-03-24

## 2022-03-14 RX ORDER — METHYLPREDNISOLONE 4 MG/1
TABLET ORAL
Qty: 1 KIT | Refills: 0 | Status: SHIPPED | OUTPATIENT
Start: 2022-03-14 | End: 2022-03-14 | Stop reason: SDUPTHER

## 2022-03-14 RX ORDER — METHYLPREDNISOLONE 4 MG/1
TABLET ORAL
Qty: 1 KIT | Refills: 0 | Status: SHIPPED | OUTPATIENT
Start: 2022-03-14 | End: 2022-03-20

## 2022-03-14 RX ORDER — AMOXICILLIN AND CLAVULANATE POTASSIUM 875; 125 MG/1; MG/1
1 TABLET, FILM COATED ORAL 2 TIMES DAILY
Qty: 20 TABLET | Refills: 0 | Status: SHIPPED | OUTPATIENT
Start: 2022-03-14 | End: 2022-03-14 | Stop reason: SDUPTHER

## 2022-03-14 ASSESSMENT — LIFESTYLE VARIABLES: SMOKING_STATUS: NO, I HAVE NEVER SMOKED

## 2022-03-14 NOTE — PROGRESS NOTES
Reviewed questionnaire     Reviewed meds/allergies    Dx URI    Plan Rx given for Augmentin and medrol dose pack, follow up with PCP if no improvement    Time spent on visit 11 min

## 2022-03-17 RX ORDER — OMEPRAZOLE 40 MG/1
40 CAPSULE, DELAYED RELEASE ORAL
Qty: 90 CAPSULE | Refills: 1 | Status: SHIPPED | OUTPATIENT
Start: 2022-03-17 | End: 2022-08-16 | Stop reason: SDUPTHER

## 2022-03-17 RX ORDER — OMEPRAZOLE 20 MG/1
20 CAPSULE, DELAYED RELEASE ORAL NIGHTLY
Qty: 90 CAPSULE | Refills: 1 | Status: SHIPPED | OUTPATIENT
Start: 2022-03-17 | End: 2022-08-16 | Stop reason: SDUPTHER

## 2022-03-17 NOTE — TELEPHONE ENCOUNTER
Writer called pharmacy and spoke with Maki Mejia. They need scripts sent in 90 day qty.      Writer prepared and signed script per Dr Margarette Dominguez

## 2022-05-11 ENCOUNTER — OFFICE VISIT (OUTPATIENT)
Dept: GASTROENTEROLOGY | Age: 42
End: 2022-05-11
Payer: COMMERCIAL

## 2022-05-11 VITALS
DIASTOLIC BLOOD PRESSURE: 89 MMHG | HEART RATE: 90 BPM | SYSTOLIC BLOOD PRESSURE: 130 MMHG | HEIGHT: 72 IN | BODY MASS INDEX: 35.21 KG/M2 | WEIGHT: 260 LBS

## 2022-05-11 DIAGNOSIS — K21.9 GERD WITHOUT ESOPHAGITIS: ICD-10-CM

## 2022-05-11 DIAGNOSIS — J30.2 SEASONAL ALLERGIES: ICD-10-CM

## 2022-05-11 DIAGNOSIS — K21.9 GASTRIC REFLUX: Primary | ICD-10-CM

## 2022-05-11 PROCEDURE — 99213 OFFICE O/P EST LOW 20 MIN: CPT | Performed by: INTERNAL MEDICINE

## 2022-05-11 ASSESSMENT — ENCOUNTER SYMPTOMS
VOMITING: 0
DIARRHEA: 0
APNEA: 0
COUGH: 0
ABDOMINAL DISTENTION: 0
CHOKING: 0
BLOOD IN STOOL: 0
NAUSEA: 0
RECTAL PAIN: 0
ANAL BLEEDING: 0
CONSTIPATION: 0
SORE THROAT: 0
WHEEZING: 0
VOICE CHANGE: 0
ABDOMINAL PAIN: 0
TROUBLE SWALLOWING: 0
SHORTNESS OF BREATH: 0
COLOR CHANGE: 0

## 2022-05-11 NOTE — PROGRESS NOTES
GI FOLLOW UP    INTERVAL HISTORY:         Chief Complaint   Patient presents with    Follow-up    Gastroesophageal Reflux    Other     Medication questions regarding Prilosec and long term use. 1. Gastric reflux          HISTORY OF PRESENT ILLNESS:  Mr.Santos LUZ MARIA Yates is a 42 y. o. male with a past history remarkable for chronic GERD, previously on PPI therapy, previously seen by , underwent upper endoscopy in September 2020 where the patient was identified to have mild gastritis.  H. pylori negative.  Focal areas of intestinal metaplasia were identified.  Patient reports refractory symptoms and currently off PPI therapy.  Referred for evaluation of refractory GERD symptoms     Non-smoker  Nondrinker  No illicit drugs  No recent abdominal surgeries  Upper endoscopy performed in September 2020  Colonoscopy performed in June 2020  Family historymother with history of colon cancer    Past Medical,Family, and Social History reviewed and does contribute to the patient presenting condition. Patient's PMH/PSH,SH,PSYCH Hx, MEDs, ALLERGIES, and ROS were all reviewed and updated in the appropriate sections.     PAST MEDICAL HISTORY:  Past Medical History:   Diagnosis Date    GERD (gastroesophageal reflux disease)     H/O cardiovascular stress test 08/2020    SMALL INFERIOR ISCHEMIA AREA    History of shingles 07/2020    Hypertension     Palpitations        Past Surgical History:   Procedure Laterality Date    CAPSULE ENDOSCOPY N/A 1/26/2021    ESOPHAGEAL CAPSULE ENDOSCOPY performed by Rasheed Suresh DO at [facility]    CARDIAC CATHETERIZATION  09/21/2020    NML CORS / NML LV FUNCTION  /  DR Dennie Provencal    COLONOSCOPY N/A 9/25/2020    COLONOSCOPY DIAGNOSTIC performed by Donald Singh MD at [address] [place] [address],[place] [address] ARTHROSCOPY Right 8/19/2021    RIGHT SHOULDER ARTHROSCOPY WITH DISTAL CLAVICLE EXCISION- S/N performed by Massiel Connelly MD at [facility] N/A 6/26/2020    EGD BIOPSY performed by Lexi Jones MD at [address]  1/26/2021    EGD BIOPSY performed by Landon Romero DO at [address] [facility] N/A 02/24/2022     EGD BIOPSY (N/A )    UPPER GASTROINTESTINAL ENDOSCOPY N/A 2/24/2022    EGD BIOPSY performed by Damon Sanchez MD at [address] EXTRACTION Bilateral        CURRENT MEDICATIONS:    Current Outpatient Medications:     omeprazole (PRILOSEC) 20 MG delayed release capsule, Take 1 capsule by mouth at bedtime, Disp: 90 capsule, Rfl: 1    omeprazole (PRILOSEC) 40 MG delayed release capsule, Take 1 capsule by mouth every morning (before breakfast), Disp: 90 capsule, Rfl: 1    fexofenadine (ALLEGRA) 180 MG tablet, TAKE 1 TABLET DAILY, Disp: 90 tablet, Rfl: 1    losartan-hydroCHLOROthiazide (HYZAAR) 100-25 MG per tablet, Take 1 tablet by mouth daily, Disp: 90 tablet, Rfl: 3    trimethoprim-polymyxin b (POLYTRIM) 15200-2.1 UNIT/ML-% ophthalmic solution, , Disp: , Rfl:     prednisoLONE acetate (PRED FORTE) 1 % ophthalmic suspension, , Disp: , Rfl:     ondansetron (ZOFRAN) 4 MG tablet, Take 1 tablet by mouth every 6 hours as needed for Nausea, Disp: 30 tablet, Rfl: 1    ALLERGIES:   Allergies   Allergen Reactions    Ace Inhibitors      cough       FAMILY HISTORY:       Problem Relation Age of Onset    Cancer Mother     Colon Cancer Mother     High Blood Pressure Father     Stroke Father          SOCIAL HISTORY:   Social History     Socioeconomic History    Marital status:      Spouse name: Not on file    Number of children: Not on file    Years of education: Not on file    Highest education level: Not on file   Occupational History    Not on file   Tobacco Use    Smoking status: Never Smoker    Smokeless tobacco: Never Used Vaping Use    Vaping Use: Never used   Substance and Sexual Activity    Alcohol use: No    Drug use: No    Sexual activity: Not on file   Other Topics Concern    Not on file   Social History Narrative    Not on file     Social Determinants of Health     Financial Resource Strain: Low Risk     Difficulty of Paying Living Expenses: Not hard at all   Food Insecurity: No Food Insecurity    Worried About Running Out of Food in the Last Year: Never true    Dom of Food in the Last Year: Never true   Transportation Needs:     Lack of Transportation (Medical): Not on file    Lack of Transportation (Non-Medical): Not on file   Physical Activity:     Days of Exercise per Week: Not on file    Minutes of Exercise per Session: Not on file   Stress:     Feeling of Stress : Not on file   Social Connections:     Frequency of Communication with Friends and Family: Not on file    Frequency of Social Gatherings with Friends and Family: Not on file    Attends Latter-day Services: Not on file    Active Member of [address] [place] [address] or Organizations: Not on file    Attends Club or Organization Meetings: Not on file    Marital Status: Not on file   Intimate Partner Violence:     Fear of Current or Ex-Partner: Not on file    Emotionally Abused: Not on file    Physically Abused: Not on file    Sexually Abused: Not on file   Housing Stability:     Unable to Pay for Housing in the Last Year: Not on file    Number of [place] in the Last Year: Not on file    Unstable Housing in the Last Year: Not on file       REVIEW OF SYSTEMS: A 12-point review of systems was obtained and pertinent positives and negatives were listed below. REVIEW OF SYSTEMS:     Constitutional: No fever, no chills, no lethargy, no weakness. HEENT:  No headache, otalgia, itchy eyes, nasal discharge or sore throat. Cardiac:  No chest pain, dyspnea, orthopnea or PND. Chest:   No cough, phlegm or wheezing. Abdomen:      Detailed by MA   Neuro:   No focal weakness, abnormal movements or seizure like activity. Skin:   No rashes, no itching. :   No hematuria, no pyuria, no dysuria, no flank pain. Extremities:  No swelling or joint pains. ROS was otherwise negative    Review of Systems   Constitutional: Negative for appetite change, fatigue, fever and unexpected weight change. HENT: Negative for sore throat, trouble swallowing and voice change. Eyes: Negative for visual disturbance. Respiratory: Negative for apnea, cough, choking, shortness of breath and wheezing. Cardiovascular: Negative for chest pain, palpitations and leg swelling. Gastrointestinal: Negative for abdominal distention, abdominal pain, anal bleeding, blood in stool, constipation, diarrhea, nausea, rectal pain and vomiting. Genitourinary: Negative for difficulty urinating. Skin: Negative for color change and rash. Neurological: Negative for dizziness, seizures, weakness, light-headedness, numbness and headaches. Hematological: Does not bruise/bleed easily. Psychiatric/Behavioral: Negative for confusion and sleep disturbance. The patient is not nervous/anxious. PHYSICAL EXAMINATION: Vital signs reviewed per the nursing documentation. /89   Pulse 90   Ht 6' (1.829 m)   Wt 260 lb (117.9 kg)   BMI 35.26 kg/m²   Body mass index is 35.26 kg/m². Physical Exam    Physical Exam   Constitutional: Patient is oriented to person, place, and time. Patient appears well-developed and well-nourished. HENT:   Head: Normocephalic and atraumatic. Eyes: Pupils are equal, round, and reactive to light. EOM are normal.   Neck: Normal range of motion. Neck supple. No JVD present. No tracheal deviation present. No thyromegaly present. Cardiovascular: Normal rate, regular rhythm, normal heart sounds and intact distal pulses. Pulmonary/Chest: Effort normal and breath sounds normal. No stridor. No respiratory distress. He has no wheezes. He has no rales.  He exhibits no tenderness. Abdominal: Soft. Bowel sounds are normal. He exhibits no distension and no mass. There is no tenderness. There is no rebound and no guarding. No hernia. Musculoskeletal: Normal range of motion. Lymphadenopathy:    Patient has no cervical adenopathy. Neurological: Patient is alert and oriented to person, place, and time. Psychiatric: Patient has a normal mood and affect. Patient behavior is normal.       LABORATORY DATA: Reviewed  Lab Results   Component Value Date    WBC 8.3 08/06/2021    HGB 15.2 08/06/2021    HCT 47.5 08/06/2021    MCV 92.2 08/06/2021     08/06/2021     08/06/2021    K 3.9 08/06/2021     08/06/2021    CO2 26 08/06/2021    BUN 19 08/06/2021    CREATININE 1.01 08/06/2021    LABPROT 7.4 12/22/2011    LABALBU 4.3 10/06/2020    BILITOT 0.46 10/06/2020    ALKPHOS 90 10/06/2020    AST 18 10/06/2020    ALT 22 10/06/2020         Lab Results   Component Value Date    RBC 5.15 08/06/2021    HGB 15.2 08/06/2021    MCV 92.2 08/06/2021    MCH 29.5 08/06/2021    MCHC 32.0 08/06/2021    RDW 11.9 08/06/2021    MPV 9.8 08/06/2021    BASOPCT 1.0 05/22/2020    LYMPHSABS 2.3 05/22/2020    MONOSABS 0.7 05/22/2020    NEUTROABS 5.5 05/22/2020    EOSABS 0.1 05/22/2020    BASOSABS 0.1 05/22/2020         DIAGNOSTIC TESTING:     No results found. IMPRESSION: Mr.Santos LUZ MARIA Yates is a 42 y. o. male with a past history remarkable for chronic GERD, previously on PPI therapy, previously seen by , underwent upper endoscopy in September 2020 where the patient was identified to have mild gastritis.  H. pylori negative.  Focal areas of intestinal metaplasia were identified.  Patient reports refractory symptoms and currently off PPI therapy.  Referred for evaluation of refractory GERD symptoms         Assessment  1. Gastric reflux        Kanika Harrison was seen today for follow-up, gastroesophageal reflux and other.     Diagnoses and all orders for this visit:    Gastric refluxpatient is controlled with PPI therapy with Prilosec 40 mg in the morning and 20 mg in the evening. And denies any significant reflux or dyspepsia symptoms. Upper endoscopy was unremarkable. No H. pylori identified on pathology. No evidence of Nolasco's esophagus. No evidence of esophageal strictures or narrowing to explain patient's dysphagia which is resolved           RTC: 3 to 4 months, will discuss tapering off PPI therapy at that time. Encouraged weight loss. Additional comments: Thank you for allowing me to participate in the care of Mr. Rich Cabrera. For any further questions please do not hesitate to contact me. I have reviewed and agree with the ROS entered by the MA/LPN from today's encounter documented in a separate note. Rhea Campuzano MD, MPH   Board Certified in Gastroenterology  Board Certified in [address] [place] [address] #: [phone]    this note is created with the assistance of a speech recognition program.  While intending to generate a document that actually reflects the content of the visit, the document can still have some errors including those of syntax and sound a like substitutions which may escape proof reading. It such instances, actual meaning can be extrapolated by contextual diversion.

## 2022-05-12 RX ORDER — FEXOFENADINE HCL 180 MG/1
TABLET ORAL
Qty: 90 TABLET | Refills: 3 | Status: SHIPPED | OUTPATIENT
Start: 2022-05-12

## 2022-05-26 ENCOUNTER — TELEPHONE (OUTPATIENT)
Dept: FAMILY MEDICINE CLINIC | Age: 42
End: 2022-05-26

## 2022-05-26 NOTE — TELEPHONE ENCOUNTER
Writer called the patient patient states he will call the [place] office an make his appt for bp check

## 2022-06-27 ENCOUNTER — OFFICE VISIT (OUTPATIENT)
Dept: FAMILY MEDICINE CLINIC | Age: 42
End: 2022-06-27
Payer: COMMERCIAL

## 2022-06-27 VITALS
BODY MASS INDEX: 35.76 KG/M2 | HEART RATE: 85 BPM | SYSTOLIC BLOOD PRESSURE: 128 MMHG | DIASTOLIC BLOOD PRESSURE: 84 MMHG | HEIGHT: 72 IN | OXYGEN SATURATION: 93 % | WEIGHT: 264 LBS

## 2022-06-27 DIAGNOSIS — E78.2 MODERATE MIXED HYPERLIPIDEMIA NOT REQUIRING STATIN THERAPY: ICD-10-CM

## 2022-06-27 DIAGNOSIS — I10 ESSENTIAL HYPERTENSION: Primary | ICD-10-CM

## 2022-06-27 PROCEDURE — 99213 OFFICE O/P EST LOW 20 MIN: CPT | Performed by: FAMILY MEDICINE

## 2022-06-27 RX ORDER — LOSARTAN POTASSIUM AND HYDROCHLOROTHIAZIDE 25; 100 MG/1; MG/1
1 TABLET ORAL DAILY
Qty: 90 TABLET | Refills: 3 | Status: SHIPPED | OUTPATIENT
Start: 2022-06-27

## 2022-06-27 ASSESSMENT — ENCOUNTER SYMPTOMS
ABDOMINAL PAIN: 0
COUGH: 0
BLOOD IN STOOL: 0
NAUSEA: 0
VOMITING: 0
SHORTNESS OF BREATH: 0
SORE THROAT: 0
CHEST TIGHTNESS: 0

## 2022-06-27 ASSESSMENT — PATIENT HEALTH QUESTIONNAIRE - PHQ9
1. LITTLE INTEREST OR PLEASURE IN DOING THINGS: 0
SUM OF ALL RESPONSES TO PHQ QUESTIONS 1-9: 0
SUM OF ALL RESPONSES TO PHQ9 QUESTIONS 1 & 2: 0
SUM OF ALL RESPONSES TO PHQ QUESTIONS 1-9: 0
SUM OF ALL RESPONSES TO PHQ QUESTIONS 1-9: 0
2. FEELING DOWN, DEPRESSED OR HOPELESS: 0
SUM OF ALL RESPONSES TO PHQ QUESTIONS 1-9: 0

## 2022-06-27 NOTE — PROGRESS NOTES
Hypertension (bp check)    BP check up  GERD - Dr Kristan Alcaraz helping   Notes allergies starting up    [address]       /84   Pulse 85   Ht 6' (1.829 m)   Wt 264 lb (119.7 kg)   SpO2 93%   BMI 35.80 kg/m²       Review of Systems   Constitutional: Negative for activity change, fatigue and fever. HENT: Negative for congestion and sore throat. Respiratory: Negative for cough, chest tightness and shortness of breath. Cardiovascular: Negative for chest pain and leg swelling. Gastrointestinal: Negative for abdominal pain, blood in stool, nausea and vomiting. Genitourinary: Negative for dysuria and frequency. Musculoskeletal: Negative for arthralgias and myalgias. Neurological: Negative for dizziness, weakness and light-headedness. Hematological: Negative for adenopathy. Psychiatric/Behavioral: Negative for agitation, behavioral problems, sleep disturbance and suicidal ideas. Negative for:     Worry / mood complaints  Headache  Dizziness  Visual Disturbance  Hearing Changes  Nasal / sinus Symptoms  Mouth / tooth symptom, pain  Throat pain  Difficulty swallowing  Neck pain  Chest discomfort  Cough  SOB  Abdominal area discomfort / pain  N/V/D/C  Pelvic area discomfort  Bladder / voiding discomfort  Bowel complaints  MS complaints   Numbness/tingling/abnormal sensations   Edema / Leg swelling  Dizziness  Fatigue  Bleeding   Skin    Pertinent Pos: See HPI - neg      Physical Exam  Constitutional:       General: He is not in acute distress. Appearance: He is well-developed. HENT:      Head: Atraumatic. Eyes:      Conjunctiva/sclera: Conjunctivae normal.   Neck:      Thyroid: No thyromegaly. Cardiovascular:      Rate and Rhythm: Normal rate and regular rhythm. Heart sounds: Normal heart sounds. No murmur heard. Pulmonary:      Effort: Pulmonary effort is normal.      Breath sounds: Normal breath sounds. No wheezing.    Abdominal:      Palpations: Abdomen is soft. Tenderness: There is no abdominal tenderness. There is no guarding. Musculoskeletal:      Cervical back: Neck supple. Skin:     General: Skin is warm and dry. Neurological:      Mental Status: He is alert and oriented to person, place, and time. Deep Tendon Reflexes: Reflexes are normal and symmetric. Psychiatric:         Behavior: Behavior normal.         Thought Content: Thought content normal.         Judgment: Judgment normal.           ASSESSMENT AND PLAN      Diagnosis Orders   1. Essential hypertension  losartan-hydroCHLOROthiazide (HYZAAR) 100-25 MG per tablet    Lipid Panel   2.  Moderate mixed hyperlipidemia not requiring statin therapy  Lipid Panel     Annual check up recommended      Electronicallysigned by Shital Moseley DO on 6/27/2022 at 11:02 AM

## 2022-08-16 ENCOUNTER — OFFICE VISIT (OUTPATIENT)
Dept: GASTROENTEROLOGY | Age: 42
End: 2022-08-16
Payer: COMMERCIAL

## 2022-08-16 VITALS
DIASTOLIC BLOOD PRESSURE: 86 MMHG | WEIGHT: 264 LBS | HEART RATE: 88 BPM | BODY MASS INDEX: 35.76 KG/M2 | SYSTOLIC BLOOD PRESSURE: 124 MMHG | HEIGHT: 72 IN

## 2022-08-16 DIAGNOSIS — K21.9 GERD WITHOUT ESOPHAGITIS: Primary | ICD-10-CM

## 2022-08-16 PROCEDURE — 99213 OFFICE O/P EST LOW 20 MIN: CPT | Performed by: INTERNAL MEDICINE

## 2022-08-16 RX ORDER — OMEPRAZOLE 40 MG/1
40 CAPSULE, DELAYED RELEASE ORAL
Qty: 90 CAPSULE | Refills: 1 | Status: SHIPPED | OUTPATIENT
Start: 2022-08-16 | End: 2022-09-26

## 2022-08-16 RX ORDER — OMEPRAZOLE 20 MG/1
20 CAPSULE, DELAYED RELEASE ORAL NIGHTLY
Qty: 90 CAPSULE | Refills: 1 | Status: SHIPPED | OUTPATIENT
Start: 2022-08-16 | End: 2022-09-21

## 2022-08-16 ASSESSMENT — ENCOUNTER SYMPTOMS
VOMITING: 0
APNEA: 0
ABDOMINAL DISTENTION: 0
RECTAL PAIN: 0
NAUSEA: 0
SORE THROAT: 0
SHORTNESS OF BREATH: 0
VOICE CHANGE: 0
BLOOD IN STOOL: 0
TROUBLE SWALLOWING: 0
ABDOMINAL PAIN: 0
CHOKING: 0
ANAL BLEEDING: 0
DIARRHEA: 0
CONSTIPATION: 0
WHEEZING: 0
COUGH: 0
COLOR CHANGE: 0

## 2022-08-16 NOTE — PROGRESS NOTES
GI FOLLOW UP    INTERVAL HISTORY:     Patient clinically improved with the twice daily dosing of PPI therapy  Asymptomatic with no significant refractory GERD symptoms  Weight relatively unchanged    Chief Complaint   Patient presents with    Follow-up    Gastroesophageal Reflux       1. Gastroesophageal reflux disease without esophagitis          HISTORY OF PRESENT ILLNESS: Mr.Santos LUZ MARIA Gore is a 42 y.o. male with a past history remarkable for chronic GERD, previously on PPI therapy, previously seen by Giancarlo Evans, underwent upper endoscopy in September 2020 where the patient was identified to have mild gastritis. H. pylori negative. Focal areas of intestinal metaplasia were identified. Patient reports refractory symptoms and currently off PPI therapy. Referred for evaluation of refractory GERD symptoms     Non-smoker  Nondrinker  No illicit drugs  No recent abdominal surgeries  Upper endoscopy performed in September 2020  Colonoscopy performed in June 2020  Family history-mother with history of colon cancer    Past Medical,Family, and Social History reviewed and does contribute to the patient presenting condition. Patient's PMH/PSH,SH,PSYCH Hx, MEDs, ALLERGIES, and ROS were all reviewed and updated in the appropriate sections.     PAST MEDICAL HISTORY:  Past Medical History:   Diagnosis Date    GERD (gastroesophageal reflux disease)     H/O cardiovascular stress test 08/2020    SMALL INFERIOR ISCHEMIA AREA    History of shingles 07/2020    Hypertension     Palpitations        Past Surgical History:   Procedure Laterality Date    CAPSULE ENDOSCOPY N/A 1/26/2021    ESOPHAGEAL CAPSULE ENDOSCOPY performed by Louis Crawford DO at [facility]    CARDIAC CATHETERIZATION  09/21/2020    NML CORS / NML LV FUNCTION  /  DR Aura Orr    COLONOSCOPY N/A 9/25/2020    COLONOSCOPY DIAGNOSTIC performed by Diana Robert MD at [address] ARTHROSCOPY Right 8/19/2021    RIGHT SHOULDER ARTHROSCOPY WITH DISTAL CLAVICLE EXCISION- S/N performed by Maribel Alcazar MD at [address] [facility] N/A 6/26/2020    EGD BIOPSY performed by Diana Robert MD at [address]  1/26/2021    EGD BIOPSY performed by Janette Jones DO at [facility] N/A 02/24/2022     EGD BIOPSY (N/A )    UPPER GASTROINTESTINAL ENDOSCOPY N/A 2/24/2022    EGD BIOPSY performed by Saray Demarco MD at [address] Bilateral        CURRENT MEDICATIONS:    Current Outpatient Medications:     losartan-hydroCHLOROthiazide (HYZAAR) 100-25 MG per tablet, Take 1 tablet by mouth daily, Disp: 90 tablet, Rfl: 3    fexofenadine (ALLEGRA) 180 MG tablet, TAKE 1 TABLET DAILY, Disp: 90 tablet, Rfl: 3    omeprazole (PRILOSEC) 20 MG delayed release capsule, Take 1 capsule by mouth at bedtime, Disp: 90 capsule, Rfl: 1    omeprazole (PRILOSEC) 40 MG delayed release capsule, Take 1 capsule by mouth every morning (before breakfast), Disp: 90 capsule, Rfl: 1    ALLERGIES:   Allergies   Allergen Reactions    Ace Inhibitors      cough       FAMILY HISTORY:       Problem Relation Age of Onset    Cancer Mother     Colon Cancer Mother     High Blood Pressure Father     Stroke Father          SOCIAL HISTORY:   Social History     Socioeconomic History    Marital status:      Spouse name: Not on file    Number of children: Not on file    Years of education: Not on file    Highest education level: Not on file   Occupational History    Not on file   Tobacco Use    Smoking status: Never    Smokeless tobacco: Never   Vaping Use    Vaping Use: Never used   Substance and Sexual Activity    Alcohol use: No    Drug use: No    Sexual activity: Not on file   Other Topics Concern    Not on file   Social History Narrative    Not on file Social Determinants of Health     Financial Resource Strain: Not on file   Food Insecurity: Not on file   Transportation Needs: Not on file   Physical Activity: Not on file   Stress: Not on file   Social Connections: Not on file   Intimate Partner Violence: Not on file   Housing Stability: Not on file       REVIEW OF SYSTEMS: A 12-point review of systems was obtained and pertinent positives and negatives were listed below. REVIEW OF SYSTEMS:     Constitutional: No fever, no chills, no lethargy, no weakness. HEENT:  No headache, otalgia, itchy eyes, nasal discharge or sore throat. Cardiac:  No chest pain, dyspnea, orthopnea or PND. Chest:   No cough, phlegm or wheezing. Abdomen:      Detailed by MA   Neuro:  No focal weakness, abnormal movements or seizure like activity. Skin:   No rashes, no itching. :   No hematuria, no pyuria, no dysuria, no flank pain. Extremities:  No swelling or joint pains. ROS was otherwise negative    Review of Systems   Constitutional:  Negative for appetite change, fatigue, fever and unexpected weight change. HENT:  Negative for sore throat, trouble swallowing and voice change. Eyes:  Negative for visual disturbance. Respiratory:  Negative for apnea, cough, choking, shortness of breath and wheezing. Cardiovascular:  Negative for chest pain, palpitations and leg swelling. Gastrointestinal:  Negative for abdominal distention, abdominal pain, anal bleeding, blood in stool, constipation, diarrhea, nausea, rectal pain and vomiting. Genitourinary:  Negative for difficulty urinating. Skin:  Negative for color change and rash. Neurological:  Negative for dizziness, seizures, weakness, light-headedness, numbness and headaches. Hematological:  Does not bruise/bleed easily. Psychiatric/Behavioral:  Negative for confusion and sleep disturbance. The patient is not nervous/anxious. PHYSICAL EXAMINATION: Vital signs reviewed per the nursing documentation. /86   Pulse 88   Ht 6' (1.829 m)   Wt 264 lb (119.7 kg)   BMI 35.80 kg/m²   Body mass index is 35.8 kg/m². Physical Exam    Physical Exam   Constitutional: Patient is oriented to person, place, and time. Patient appears well-developed and well-nourished. HENT:   Head: Normocephalic and atraumatic. Eyes: Pupils are equal, round, and reactive to light. EOM are normal.   Neck: Normal range of motion. Neck supple. No JVD present. No tracheal deviation present. No thyromegaly present. Cardiovascular: Normal rate, regular rhythm, normal heart sounds and intact distal pulses. Pulmonary/Chest: Effort normal and breath sounds normal. No stridor. No respiratory distress. He has no wheezes. He has no rales. He exhibits no tenderness. Abdominal: Soft. Bowel sounds are normal. He exhibits no distension and no mass. There is no tenderness. There is no rebound and no guarding. No hernia. Musculoskeletal: Normal range of motion. Lymphadenopathy:    Patient has no cervical adenopathy. Neurological: Patient is alert and oriented to person, place, and time. Psychiatric: Patient has a normal mood and affect.  Patient behavior is normal.       LABORATORY DATA: Reviewed  Lab Results   Component Value Date    WBC 8.3 08/06/2021    HGB 15.2 08/06/2021    HCT 47.5 08/06/2021    MCV 92.2 08/06/2021     08/06/2021     08/06/2021    K 3.9 08/06/2021     08/06/2021    CO2 26 08/06/2021    BUN 19 08/06/2021    CREATININE 1.01 08/06/2021    LABPROT 7.4 12/22/2011    LABALBU 4.3 10/06/2020    BILITOT 0.46 10/06/2020    ALKPHOS 90 10/06/2020    AST 18 10/06/2020    ALT 22 10/06/2020         Lab Results   Component Value Date    RBC 5.15 08/06/2021    HGB 15.2 08/06/2021    MCV 92.2 08/06/2021    MCH 29.5 08/06/2021    MCHC 32.0 08/06/2021    RDW 11.9 08/06/2021    MPV 9.8 08/06/2021    BASOPCT 1.0 05/22/2020    LYMPHSABS 2.3 05/22/2020    MONOSABS 0.7 05/22/2020    NEUTROABS 5.5 05/22/2020 EOSABS 0.1 05/22/2020    BASOSABS 0.1 05/22/2020         DIAGNOSTIC TESTING:     No results found. IMPRESSION: Mr.Santos LUZ MARIA Rodriguez is a 42 y.o. male with a past history remarkable for chronic GERD, previously on PPI therapy, previously seen by Radha Anderson, underwent upper endoscopy in September 2020 where the patient was identified to have mild gastritis. H. pylori negative. Focal areas of intestinal metaplasia were identified. Patient reports refractory symptoms and currently off PPI therapy. Referred for evaluation of refractory GERD symptoms          GERD well-controlled      Assessment  1. Gastroesophageal reflux disease without esophagitis        Loulou Bai was seen today for follow-up and gastroesophageal reflux. Diagnoses and all orders for this visit:    Gastroesophageal reflux disease without esophagitis-patient continue with Prilosec 40 mg a morning and 20 mg in evening. Asymptomatic and tolerating regimen well. Strongly encouraged weight loss and avoidance of dietary triggers. FH- Colon Ca.-patient will need surveillance colonoscopy, not due this year. Will discuss at next visit. RTC: 3 months, will attempt to minimize PPI therapy next visit. Additional comments: Overall, doing well    Thank you for allowing me to participate in the care of Mr. Cookie Rodriguez. For any further questions please do not hesitate to contact me. I have reviewed and agree with the ROS entered by the MA/LPN from today's encounter documented in a separate note. Cheryl Condon MD, MPH   Board Certified in Gastroenterology  Board Certified in [address] [place] [address] #: [phone]    this note is created with the assistance of a speech recognition program.  While intending to generate a document that actually reflects the content of the visit, the document can still have some errors including those of syntax and sound a like substitutions which may escape proof reading.   It such instances, actual meaning can be extrapolated by contextual diversion.

## 2022-09-19 DIAGNOSIS — K21.9 GERD WITHOUT ESOPHAGITIS: ICD-10-CM

## 2022-09-21 RX ORDER — OMEPRAZOLE 20 MG/1
20 CAPSULE, DELAYED RELEASE ORAL NIGHTLY
Qty: 90 CAPSULE | Refills: 0 | Status: SHIPPED | OUTPATIENT
Start: 2022-09-21

## 2022-09-26 RX ORDER — OMEPRAZOLE 40 MG/1
CAPSULE, DELAYED RELEASE ORAL
Qty: 90 CAPSULE | Refills: 0 | Status: SHIPPED | OUTPATIENT
Start: 2022-09-26

## 2022-11-09 ENCOUNTER — OFFICE VISIT (OUTPATIENT)
Dept: GASTROENTEROLOGY | Age: 42
End: 2022-11-09
Payer: COMMERCIAL

## 2022-11-09 VITALS
SYSTOLIC BLOOD PRESSURE: 141 MMHG | DIASTOLIC BLOOD PRESSURE: 99 MMHG | HEART RATE: 108 BPM | WEIGHT: 264.8 LBS | HEIGHT: 72 IN | BODY MASS INDEX: 35.87 KG/M2

## 2022-11-09 DIAGNOSIS — K21.9 GASTROESOPHAGEAL REFLUX DISEASE WITHOUT ESOPHAGITIS: Primary | ICD-10-CM

## 2022-11-09 PROCEDURE — 3078F DIAST BP <80 MM HG: CPT | Performed by: INTERNAL MEDICINE

## 2022-11-09 PROCEDURE — 99213 OFFICE O/P EST LOW 20 MIN: CPT | Performed by: INTERNAL MEDICINE

## 2022-11-09 PROCEDURE — 3074F SYST BP LT 130 MM HG: CPT | Performed by: INTERNAL MEDICINE

## 2022-11-09 NOTE — PROGRESS NOTES
GI FOLLOW UP    INTERVAL HISTORY:     Chronic GERD, clinically doing well and responding to therapy    Chief Complaint   Patient presents with    Follow-up     Gastroesophageal Reflux, feeling better       1. Gastroesophageal reflux disease without esophagitis          HISTORY OF PRESENT ILLNESS: Mr.Santos LUZ MARIA Mckoy is a 42 y.o. male with a past history remarkable for chronic GERD, previously on PPI therapy, previously seen by Kendall Funes, underwent upper endoscopy in September 2020 where the patient was identified to have mild gastritis. H. pylori negative. Focal areas of intestinal metaplasia were identified. Patient reports refractory symptoms and currently off PPI therapy. Referred for evaluation of refractory GERD symptoms     Non-smoker  Nondrinker  No illicit drugs  No recent abdominal surgeries  Upper endoscopy performed in September 2020  Colonoscopy performed in June 2020  Family history-mother with history of colon cancer    Past Medical,Family, and Social History reviewed and does contribute to the patient presenting condition. Patient's PMH/PSH,SH,PSYCH Hx, MEDs, ALLERGIES, and ROS were all reviewed and updated in the appropriate sections.     PAST MEDICAL HISTORY:  Past Medical History:   Diagnosis Date    GERD (gastroesophageal reflux disease)     H/O cardiovascular stress test 08/2020    SMALL INFERIOR ISCHEMIA AREA    History of shingles 07/2020    Hypertension     Palpitations        Past Surgical History:   Procedure Laterality Date    CAPSULE ENDOSCOPY N/A 1/26/2021    ESOPHAGEAL CAPSULE ENDOSCOPY performed by Moe Gee DO at [facility]    CARDIAC CATHETERIZATION  09/21/2020    NML CORS / NML LV FUNCTION  /  DR Dena Tellez    COLONOSCOPY N/A 9/25/2020    COLONOSCOPY DIAGNOSTIC performed by Ivania Banuelos MD at [address] [facility] ARTHROSCOPY Right 8/19/2021 RIGHT SHOULDER ARTHROSCOPY WITH DISTAL CLAVICLE EXCISION- S/N performed by Wayne Nunes MD at [facility] N/A 6/26/2020    EGD BIOPSY performed by Jann Ko MD at [address] [facility]  1/26/2021    EGD BIOPSY performed by Kee Edwards DO at [place] [address] N/A 02/24/2022     EGD BIOPSY (N/A )    UPPER GASTROINTESTINAL ENDOSCOPY N/A 2/24/2022    EGD BIOPSY performed by Buckley Duane, MD at [address] Bilateral        CURRENT MEDICATIONS:    Current Outpatient Medications:     omeprazole (PRILOSEC) 40 MG delayed release capsule, TAKE 1 CAPSULE BY MOUTH EVERY MORNING BEFORE BREAKFAST, Disp: 90 capsule, Rfl: 0    omeprazole (PRILOSEC) 20 MG delayed release capsule, TAKE 1 CAPSULE BY MOUTH AT BEDTIME, Disp: 90 capsule, Rfl: 0    losartan-hydroCHLOROthiazide (HYZAAR) 100-25 MG per tablet, Take 1 tablet by mouth daily, Disp: 90 tablet, Rfl: 3    fexofenadine (ALLEGRA) 180 MG tablet, TAKE 1 TABLET DAILY, Disp: 90 tablet, Rfl: 3    ALLERGIES:   Allergies   Allergen Reactions    Ace Inhibitors      cough       FAMILY HISTORY:       Problem Relation Age of Onset    Cancer Mother     Colon Cancer Mother     High Blood Pressure Father     Stroke Father          SOCIAL HISTORY:   Social History     Socioeconomic History    Marital status:      Spouse name: Not on file    Number of children: Not on file    Years of education: Not on file    Highest education level: Not on file   Occupational History    Not on file   Tobacco Use    Smoking status: Never    Smokeless tobacco: Never   Vaping Use    Vaping Use: Never used   Substance and Sexual Activity    Alcohol use: No    Drug use: No    Sexual activity: Not on file   Other Topics Concern    Not on file   Social History Narrative    Not on file     Social Determinants of Health     Financial Resource Strain: Not on file   Food Insecurity: Not on file   Transportation Needs: Not on file   Physical Activity: Not on file   Stress: Not on file   Social Connections: Not on file   Intimate Partner Violence: Not on file   Housing Stability: Not on file       REVIEW OF SYSTEMS: A 12-point review of systems was obtained and pertinent positives and negatives were listed below. REVIEW OF SYSTEMS:     Constitutional: No fever, no chills, no lethargy, no weakness. HEENT:  No headache, otalgia, itchy eyes, nasal discharge or sore throat. Cardiac:  No chest pain, dyspnea, orthopnea or PND. Chest:   No cough, phlegm or wheezing. Abdomen:      Detailed by MA   Neuro:  No focal weakness, abnormal movements or seizure like activity. Skin:   No rashes, no itching. :   No hematuria, no pyuria, no dysuria, no flank pain. Extremities:  No swelling or joint pains. ROS was otherwise negative    Review of Systems    PHYSICAL EXAMINATION: Vital signs reviewed per the nursing documentation. BP (!) 141/99 (Site: Left Upper Arm, Position: Sitting, Cuff Size: Large Adult)   Pulse (!) 108   Ht 6' (1.829 m)   Wt 264 lb 12.8 oz (120.1 kg)   BMI 35.91 kg/m²   Body mass index is 35.91 kg/m². Physical Exam    Physical Exam   Constitutional: Patient is oriented to person, place, and time. Patient appears well-developed and well-nourished. HENT:   Head: Normocephalic and atraumatic. Eyes: Pupils are equal, round, and reactive to light. EOM are normal.   Neck: Normal range of motion. Neck supple. No JVD present. No tracheal deviation present. No thyromegaly present. Cardiovascular: Normal rate, regular rhythm, normal heart sounds and intact distal pulses. Pulmonary/Chest: Effort normal and breath sounds normal. No stridor. No respiratory distress. He has no wheezes. He has no rales. He exhibits no tenderness. Abdominal: Soft. Bowel sounds are normal. He exhibits no distension and no mass. There is no tenderness.  There is no rebound and no guarding. No hernia. Musculoskeletal: Normal range of motion. Lymphadenopathy:    Patient has no cervical adenopathy. Neurological: Patient is alert and oriented to person, place, and time. Psychiatric: Patient has a normal mood and affect. Patient behavior is normal.       LABORATORY DATA: Reviewed  Lab Results   Component Value Date    WBC 8.3 08/06/2021    HGB 15.2 08/06/2021    HCT 47.5 08/06/2021    MCV 92.2 08/06/2021     08/06/2021     08/06/2021    K 3.9 08/06/2021     08/06/2021    CO2 26 08/06/2021    BUN 19 08/06/2021    CREATININE 1.01 08/06/2021    LABPROT 7.4 12/22/2011    LABALBU 4.3 10/06/2020    BILITOT 0.46 10/06/2020    ALKPHOS 90 10/06/2020    AST 18 10/06/2020    ALT 22 10/06/2020         Lab Results   Component Value Date    RBC 5.15 08/06/2021    HGB 15.2 08/06/2021    MCV 92.2 08/06/2021    MCH 29.5 08/06/2021    MCHC 32.0 08/06/2021    RDW 11.9 08/06/2021    MPV 9.8 08/06/2021    BASOPCT 1.0 05/22/2020    LYMPHSABS 2.3 05/22/2020    MONOSABS 0.7 05/22/2020    NEUTROABS 5.5 05/22/2020    EOSABS 0.1 05/22/2020    BASOSABS 0.1 05/22/2020         DIAGNOSTIC TESTING:     No results found. IMPRESSION: Mr.Santos LUZ MARIA Lucas Patient is a 42 y.o. male with a past history remarkable for chronic GERD, previously on PPI therapy, previously seen by Wali Farias, underwent upper endoscopy in September 2020 where the patient was identified to have mild gastritis. H. pylori negative. Focal areas of intestinal metaplasia were identified. Patient reports refractory symptoms and currently off PPI therapy. Referred for evaluation of refractory GERD symptoms      Assessment  1. Gastroesophageal reflux disease without esophagitis        Chasity Nice was seen today for follow-up. Diagnoses and all orders for this visit:    Gastroesophageal reflux disease without esophagitis-patient continue with PPI twice daily, 40 mg in a.m. and 20 mg in the evening.   Clinically doing well from GI standpoint. Encouraged weight loss. RTC: 3 months. Additional comments: Thank you for allowing me to participate in the care of Mr. Mahendra Lombardi. For any further questions please do not hesitate to contact me. I have reviewed and agree with the ROS entered by the MA/LPN from today's encounter documented in a separate note. Desiree Umaña MD, MPH   Board Certified in Gastroenterology  Board Certified in [address] [place] [address] #: [phone]    this note is created with the assistance of a speech recognition program.  While intending to generate a document that actually reflects the content of the visit, the document can still have some errors including those of syntax and sound a like substitutions which may escape proof reading. It such instances, actual meaning can be extrapolated by contextual diversion.

## 2023-04-28 NOTE — PROGRESS NOTES
New Patient (Patient states he is establishing care. He states he has been feeling pressure in his upper abdomen and acid reflux. He is also having pain in his lower right abdomen and back.)    Telephone Visit (Audio Only)    Consent: patient has previously opted for and currently agrees to a TeleHealth (Telephone Visit) encounter, in place of a face-to-face ambulatory visit and is informed that the encounter will be billed as such accordingly. Location:     Patient / off site location: [facility]   - Location: home     Physician: Griselda Herrera. DO [place] - Location: [facility] office    Length of time: 34 minutes        NP establish care    Dr. Gil Whalen - recommended  Dr Monica Felipe - previous PCP  His CNP is taking care of him now. Wants to be closer to [facility] as he lives here  Dr Roesndo Christopher - recent EGD - 3 weeks ago (June, 2020)  Dr Rylee Gonzalez - may be having GB; ordered HIDA test  HIDA test scheduled for tomorrow  Indigestion - reflux, onset during COVID (started in March, 2020. Had right shoulder pain, pressure in upper abdomen, left sided abd pain, solar plexus area, still get the pain in the upper abdomen. Dr Vania Schaffer - took over for PCP Monica Felipe, ordered a steroid     Started having food sensitives, tried taper dose. Tried Protonix - no help, still has triggers    Prior health status completely unremarkable. Occupation - ; heavy lifting - uses mechanical devices, mostly cardio, running stairs,     Prior Hx - no surgeries, no traumas,     Was in with Dr Adrian Santiago (ortho) at the [facility], right shoulder pain, AC joint issue, received an injection from Dr Adrian Santiago. (will return in 3-4 months). CNP - Started on an anxiety medication because of poor sleep. Has been doing home stretching exercises.     Daily - constant symptoms, initially after eating, indigestion like feeling, then felt like was \"having a heart attack\"; stress worries,     Hx HTN - typically stays at 119/ systolic; weighs Appropriate/Depressed

## 2023-05-01 ENCOUNTER — OFFICE VISIT (OUTPATIENT)
Dept: FAMILY MEDICINE CLINIC | Age: 43
End: 2023-05-01
Payer: COMMERCIAL

## 2023-05-01 VITALS
HEART RATE: 82 BPM | OXYGEN SATURATION: 97 % | BODY MASS INDEX: 36.27 KG/M2 | HEIGHT: 72 IN | WEIGHT: 267.8 LBS | SYSTOLIC BLOOD PRESSURE: 136 MMHG | DIASTOLIC BLOOD PRESSURE: 83 MMHG

## 2023-05-01 DIAGNOSIS — Z12.5 PROSTATE CANCER SCREENING: ICD-10-CM

## 2023-05-01 DIAGNOSIS — I10 ESSENTIAL HYPERTENSION: ICD-10-CM

## 2023-05-01 DIAGNOSIS — E78.2 MODERATE MIXED HYPERLIPIDEMIA NOT REQUIRING STATIN THERAPY: Primary | ICD-10-CM

## 2023-05-01 DIAGNOSIS — E66.01 SEVERE OBESITY (BMI 35.0-39.9) WITH COMORBIDITY (HCC): ICD-10-CM

## 2023-05-01 PROBLEM — R10.11 ABDOMINAL PAIN, RIGHT UPPER QUADRANT: Status: RESOLVED | Noted: 2020-07-17 | Resolved: 2023-05-01

## 2023-05-01 PROCEDURE — 3079F DIAST BP 80-89 MM HG: CPT | Performed by: FAMILY MEDICINE

## 2023-05-01 PROCEDURE — 3075F SYST BP GE 130 - 139MM HG: CPT | Performed by: FAMILY MEDICINE

## 2023-05-01 PROCEDURE — 99213 OFFICE O/P EST LOW 20 MIN: CPT | Performed by: FAMILY MEDICINE

## 2023-05-01 RX ORDER — LOSARTAN POTASSIUM AND HYDROCHLOROTHIAZIDE 25; 100 MG/1; MG/1
1 TABLET ORAL DAILY
Qty: 90 TABLET | Refills: 3 | Status: SHIPPED | OUTPATIENT
Start: 2023-05-01

## 2023-05-01 SDOH — ECONOMIC STABILITY: FOOD INSECURITY: WITHIN THE PAST 12 MONTHS, YOU WORRIED THAT YOUR FOOD WOULD RUN OUT BEFORE YOU GOT MONEY TO BUY MORE.: NEVER TRUE

## 2023-05-01 SDOH — ECONOMIC STABILITY: FOOD INSECURITY: WITHIN THE PAST 12 MONTHS, THE FOOD YOU BOUGHT JUST DIDN'T LAST AND YOU DIDN'T HAVE MONEY TO GET MORE.: NEVER TRUE

## 2023-05-01 SDOH — ECONOMIC STABILITY: HOUSING INSECURITY
IN THE LAST 12 MONTHS, WAS THERE A TIME WHEN YOU DID NOT HAVE A STEADY PLACE TO SLEEP OR SLEPT IN A SHELTER (INCLUDING NOW)?: NO

## 2023-05-01 SDOH — ECONOMIC STABILITY: INCOME INSECURITY: HOW HARD IS IT FOR YOU TO PAY FOR THE VERY BASICS LIKE FOOD, HOUSING, MEDICAL CARE, AND HEATING?: NOT HARD AT ALL

## 2023-05-01 ASSESSMENT — PATIENT HEALTH QUESTIONNAIRE - PHQ9
SUM OF ALL RESPONSES TO PHQ QUESTIONS 1-9: 0
1. LITTLE INTEREST OR PLEASURE IN DOING THINGS: 0
SUM OF ALL RESPONSES TO PHQ QUESTIONS 1-9: 0
2. FEELING DOWN, DEPRESSED OR HOPELESS: 0
SUM OF ALL RESPONSES TO PHQ QUESTIONS 1-9: 0
SUM OF ALL RESPONSES TO PHQ QUESTIONS 1-9: 0
SUM OF ALL RESPONSES TO PHQ9 QUESTIONS 1 & 2: 0

## 2023-05-01 NOTE — PROGRESS NOTES
BP refill med  Recent bronchitis - treated at an , resolving well. Labs requested per patient  Chart reviewed      Negative for:     Worry / mood complaints  Headache  Dizziness  Visual Disturbance  Hearing Changes  Nasal / sinus Symptoms  Mouth / tooth symptom, pain  Throat pain  Difficulty swallowing  Neck pain  Chest discomfort  Cough  SOB  N/V/D/C  Pelvic area discomfort  Bladder / voiding discomfort  Bowel complaints  MS complaints   Numbness/tingling/abnormal sensations   Edema / Leg swelling  Dizziness  Fatigue  Bleeding   Skin    Pertinent Pos: See HPI - as above    Vitals:    05/01/23 0912   BP: 136/83   Pulse: 82   SpO2: 97%       Alert and oriented to PPT  NAD    HEENT - neg  Neck - no bruits, no lymphadenopathy  Chest  HRRR w/o murmer  LCTAB no wheezes / rhonchi  Extremities - 0+ PTE    Gait / Station - stable, no dysequilibrium, uniform pace, no assist device, cane. Diagnosis Orders   1. Moderate mixed hyperlipidemia not requiring statin therapy  Comprehensive Metabolic Panel    Lipid, Fasting      2. Essential hypertension  losartan-hydroCHLOROthiazide (HYZAAR) 100-25 MG per tablet      3. Prostate cancer screening  PSA Screening      4. Severe obesity (BMI 35.0-39. 9) with comorbidity ([address]            Plan:  1.)  labs as per orders  2.)  call results  3.)  nicole in 1 yr

## 2023-09-06 ENCOUNTER — OFFICE VISIT (OUTPATIENT)
Dept: GASTROENTEROLOGY | Age: 43
End: 2023-09-06
Payer: COMMERCIAL

## 2023-09-06 VITALS
WEIGHT: 269.6 LBS | SYSTOLIC BLOOD PRESSURE: 132 MMHG | BODY MASS INDEX: 36.52 KG/M2 | DIASTOLIC BLOOD PRESSURE: 88 MMHG | HEIGHT: 72 IN

## 2023-09-06 DIAGNOSIS — K21.9 GASTROESOPHAGEAL REFLUX DISEASE WITHOUT ESOPHAGITIS: Primary | ICD-10-CM

## 2023-09-06 PROCEDURE — 99213 OFFICE O/P EST LOW 20 MIN: CPT | Performed by: INTERNAL MEDICINE

## 2023-09-06 PROCEDURE — 3075F SYST BP GE 130 - 139MM HG: CPT | Performed by: INTERNAL MEDICINE

## 2023-09-06 PROCEDURE — 3079F DIAST BP 80-89 MM HG: CPT | Performed by: INTERNAL MEDICINE

## 2023-09-06 ASSESSMENT — ENCOUNTER SYMPTOMS
ABDOMINAL PAIN: 0
CHOKING: 0
GASTROINTESTINAL NEGATIVE: 1
SORE THROAT: 0
TROUBLE SWALLOWING: 0
VOMITING: 0
NAUSEA: 0
COUGH: 0
APNEA: 0
EYES NEGATIVE: 1
SHORTNESS OF BREATH: 0
RECTAL PAIN: 0
ABDOMINAL DISTENTION: 0
VOICE CHANGE: 0
WHEEZING: 0
RESPIRATORY NEGATIVE: 1
BLOOD IN STOOL: 0
DIARRHEA: 0
COLOR CHANGE: 0
CONSTIPATION: 0
ANAL BLEEDING: 0

## 2023-09-06 NOTE — PROGRESS NOTES
GI FOLLOW UP    INTERVAL HISTORY:     Chronic GERD, clinically doing well and responding to therapy    Chief Complaint   Patient presents with    Follow-up    Gastroesophageal Reflux       1. Gastroesophageal reflux disease without esophagitis          HISTORY OF PRESENT ILLNESS: Mr.Santos LUZ MARIA Womack is a 42 y.o. male with a past history remarkable for chronic GERD, previously on PPI therapy, previously seen by Laly Bliss, underwent upper endoscopy in September 2020 where the patient was identified to have mild gastritis. H. pylori negative. Focal areas of intestinal metaplasia were identified. Patient reports refractory symptoms and currently off PPI therapy. Referred for evaluation of refractory GERD symptoms     Non-smoker  Nondrinker  No illicit drugs  No recent abdominal surgeries  Upper endoscopy performed in September 2020  Colonoscopy performed in June 2020  Family history-mother with history of colon cancer    Past Medical,Family, and Social History reviewed and does contribute to the patient presenting condition. Patient's PMH/PSH,SH,PSYCH Hx, MEDs, ALLERGIES, and ROS were all reviewed and updated in the appropriate sections.     PAST MEDICAL HISTORY:  Past Medical History:   Diagnosis Date    GERD (gastroesophageal reflux disease)     H/O cardiovascular stress test 08/2020    SMALL INFERIOR ISCHEMIA AREA    History of shingles 07/2020    Hypertension     Palpitations        Past Surgical History:   Procedure Laterality Date    CAPSULE ENDOSCOPY N/A 1/26/2021    ESOPHAGEAL CAPSULE ENDOSCOPY performed by Jamie Almaguer DO at [facility]    CARDIAC CATHETERIZATION  09/21/2020    NML CORS / NML LV FUNCTION  /  DR Lenin Bradley    COLONOSCOPY N/A 9/25/2020    COLONOSCOPY DIAGNOSTIC performed by Alise Mccain MD at [address] [facility] ARTHROSCOPY Right 8/19/2021    RIGHT SHOULDER

## 2023-12-27 ENCOUNTER — TELEPHONE (OUTPATIENT)
Dept: GASTROENTEROLOGY | Age: 43
End: 2023-12-27

## 2023-12-27 NOTE — TELEPHONE ENCOUNTER
Writer spoke with patient about [address], Patient would like to be schedule with  when he comes to [facility], Patient has also seen Mallory Burger in the past. Gastroesophageal reflux disease

## 2024-05-12 DIAGNOSIS — I10 ESSENTIAL HYPERTENSION: ICD-10-CM

## 2024-05-13 RX ORDER — LOSARTAN POTASSIUM AND HYDROCHLOROTHIAZIDE 25; 100 MG/1; MG/1
1 TABLET ORAL DAILY
Qty: 90 TABLET | Refills: 0 | Status: SHIPPED | OUTPATIENT
Start: 2024-05-13

## 2024-06-06 ENCOUNTER — HOSPITAL ENCOUNTER (OUTPATIENT)
Age: 44
Setting detail: SPECIMEN
Discharge: HOME | End: 2024-06-06

## 2024-06-06 ENCOUNTER — OFFICE VISIT (OUTPATIENT)
Dept: PRIMARY CARE CLINIC | Age: 44
End: 2024-06-06
Payer: COMMERCIAL

## 2024-06-06 VITALS
BODY MASS INDEX: 36.86 KG/M2 | WEIGHT: 271.8 LBS | HEART RATE: 80 BPM | SYSTOLIC BLOOD PRESSURE: 130 MMHG | DIASTOLIC BLOOD PRESSURE: 80 MMHG | RESPIRATION RATE: 96 BRPM

## 2024-06-06 DIAGNOSIS — I10 ESSENTIAL HYPERTENSION: Primary | ICD-10-CM

## 2024-06-06 DIAGNOSIS — I10 ESSENTIAL HYPERTENSION: ICD-10-CM

## 2024-06-06 DIAGNOSIS — Z80.0 FAMILY HISTORY OF COLON CANCER IN MOTHER: ICD-10-CM

## 2024-06-06 DIAGNOSIS — Z12.5 SCREENING PSA (PROSTATE SPECIFIC ANTIGEN): ICD-10-CM

## 2024-06-06 LAB
ALBUMIN SERPL-MCNC: 4.3 G/DL (ref 3.5–5.2)
ALBUMIN/GLOB SERPL: 1 {RATIO} (ref 1–2.5)
ALP SERPL-CCNC: 84 U/L (ref 40–129)
ALT SERPL-CCNC: 28 U/L (ref 10–50)
ANION GAP SERPL CALCULATED.3IONS-SCNC: 11 MMOL/L (ref 9–16)
AST SERPL-CCNC: 27 U/L (ref 10–50)
BILIRUB SERPL-MCNC: 0.6 MG/DL (ref 0–1.2)
BUN SERPL-MCNC: 20 MG/DL (ref 6–20)
CALCIUM SERPL-MCNC: 8.9 MG/DL (ref 8.6–10.4)
CHLORIDE SERPL-SCNC: 104 MMOL/L (ref 98–107)
CHOLEST SERPL-MCNC: 179 MG/DL (ref 0–199)
CHOLESTEROL/HDL RATIO: 6
CO2 SERPL-SCNC: 25 MMOL/L (ref 20–31)
CREAT SERPL-MCNC: 1 MG/DL (ref 0.7–1.2)
GFR, ESTIMATED: >90 ML/MIN/1.73M2
GLUCOSE SERPL-MCNC: 94 MG/DL (ref 74–99)
HDLC SERPL-MCNC: 32 MG/DL
LDLC SERPL CALC-MCNC: 106 MG/DL (ref 0–100)
POTASSIUM SERPL-SCNC: 4.1 MMOL/L (ref 3.7–5.3)
PROT SERPL-MCNC: 7.2 G/DL (ref 6.6–8.7)
PSA SERPL-MCNC: 0.6 NG/ML (ref 0–4)
SODIUM SERPL-SCNC: 140 MMOL/L (ref 136–145)
TRIGL SERPL-MCNC: 209 MG/DL (ref 0–149)
VLDLC SERPL CALC-MCNC: 42 MG/DL

## 2024-06-06 PROCEDURE — 99213 OFFICE O/P EST LOW 20 MIN: CPT | Performed by: FAMILY MEDICINE

## 2024-06-06 PROCEDURE — 3079F DIAST BP 80-89 MM HG: CPT | Performed by: FAMILY MEDICINE

## 2024-06-06 PROCEDURE — 3075F SYST BP GE 130 - 139MM HG: CPT | Performed by: FAMILY MEDICINE

## 2024-06-06 RX ORDER — LOSARTAN POTASSIUM AND HYDROCHLOROTHIAZIDE 25; 100 MG/1; MG/1
1 TABLET ORAL DAILY
Qty: 90 TABLET | Refills: 3 | Status: SHIPPED | OUTPATIENT
Start: 2024-06-06

## 2024-06-06 SDOH — ECONOMIC STABILITY: FOOD INSECURITY: WITHIN THE PAST 12 MONTHS, YOU WORRIED THAT YOUR FOOD WOULD RUN OUT BEFORE YOU GOT MONEY TO BUY MORE.: NEVER TRUE

## 2024-06-06 SDOH — ECONOMIC STABILITY: INCOME INSECURITY: HOW HARD IS IT FOR YOU TO PAY FOR THE VERY BASICS LIKE FOOD, HOUSING, MEDICAL CARE, AND HEATING?: NOT HARD AT ALL

## 2024-06-06 ASSESSMENT — PATIENT HEALTH QUESTIONNAIRE - PHQ9
SUM OF ALL RESPONSES TO PHQ QUESTIONS 1-9: 0
1. LITTLE INTEREST OR PLEASURE IN DOING THINGS: NOT AT ALL
SUM OF ALL RESPONSES TO PHQ9 QUESTIONS 1 & 2: 0
SUM OF ALL RESPONSES TO PHQ QUESTIONS 1-9: 0
2. FEELING DOWN, DEPRESSED OR HOPELESS: NOT AT ALL

## 2024-06-06 NOTE — PROGRESS NOTES
BP check today  Got glasses for the first time this year (OD)  No tobacco  Weight concerns  Back pain resolved -chart reviewed        Negative for:     Worry / mood complaints  Headache  Dizziness  Visual Disturbance  Hearing Changes  Nasal / sinus Symptoms  Mouth / tooth symptom, pain  Throat pain  Difficulty swallowing  Neck pain  Chest discomfort  Cough  SOB  N/V/D/C  Pelvic area discomfort  Bladder / voiding discomfort  Bowel complaints  MSk complaints   Numbness/tingling/abnormal sensations   Edema / Leg swelling  Dizziness  Fatigue  Bleeding   Skin    Pertinent Pos: See HPI -as above    Vitals:    06/06/24 0834   BP: 130/80   Pulse: 80   Resp: (!) 96       Alert and oriented to PPT  NAD    HEENT - neg  Neck - no bruits, no lymphadenopathy  Chest  HRRR w/o murmer  LCTAB no wheezes / rhonchiExtremities -0+ PTE    Gait / Station - stable, no dysequilibrium, uniform pace, no assist device, cane.     Diagnosis Orders   1. Essential hypertension  losartan-hydroCHLOROthiazide (HYZAAR) 100-25 MG per tablet    Comprehensive Metabolic Panel    Lipid, Fasting      2. Family history of colon cancer in mother  Felipe Simmons MD, Gastroenterology, [place]    Comprehensive Metabolic Panel    Lipid, Fasting      3. Screening PSA (prostate specific antigen)  PSA Screening          Plan:  1.)  suveillance colonoscopy 2025  2.)  refill hypertension medication for 1 year  3.)  Chart reviewed screenings otherwise up-to-date  4.)  Recheck 1 year

## 2024-06-11 ENCOUNTER — TELEPHONE (OUTPATIENT)
Dept: GASTROENTEROLOGY | Age: 44
End: 2024-06-11

## 2024-12-19 ENCOUNTER — OFFICE VISIT (OUTPATIENT)
Dept: PRIMARY CARE CLINIC | Age: 44
End: 2024-12-19

## 2024-12-19 VITALS
HEIGHT: 72 IN | DIASTOLIC BLOOD PRESSURE: 82 MMHG | OXYGEN SATURATION: 97 % | BODY MASS INDEX: 36.85 KG/M2 | SYSTOLIC BLOOD PRESSURE: 118 MMHG | HEART RATE: 94 BPM | WEIGHT: 272.1 LBS

## 2024-12-19 DIAGNOSIS — K21.9 GERD WITHOUT ESOPHAGITIS: ICD-10-CM

## 2024-12-19 DIAGNOSIS — J30.2 SEASONAL ALLERGIES: ICD-10-CM

## 2024-12-19 DIAGNOSIS — J45.20 MILD INTERMITTENT REACTIVE AIRWAY DISEASE WITH WHEEZING WITHOUT COMPLICATION: ICD-10-CM

## 2024-12-19 DIAGNOSIS — R05.2 SUBACUTE COUGH: Primary | ICD-10-CM

## 2024-12-19 DIAGNOSIS — Z23 NEED FOR INFLUENZA VACCINATION: ICD-10-CM

## 2024-12-19 RX ORDER — FEXOFENADINE HCL 180 MG/1
180 TABLET ORAL DAILY
Qty: 90 TABLET | Refills: 3 | Status: SHIPPED | OUTPATIENT
Start: 2024-12-19

## 2024-12-19 SDOH — ECONOMIC STABILITY: FOOD INSECURITY: WITHIN THE PAST 12 MONTHS, YOU WORRIED THAT YOUR FOOD WOULD RUN OUT BEFORE YOU GOT MONEY TO BUY MORE.: NEVER TRUE

## 2024-12-19 SDOH — ECONOMIC STABILITY: INCOME INSECURITY: HOW HARD IS IT FOR YOU TO PAY FOR THE VERY BASICS LIKE FOOD, HOUSING, MEDICAL CARE, AND HEATING?: NOT HARD AT ALL

## 2024-12-19 SDOH — ECONOMIC STABILITY: FOOD INSECURITY: WITHIN THE PAST 12 MONTHS, THE FOOD YOU BOUGHT JUST DIDN'T LAST AND YOU DIDN'T HAVE MONEY TO GET MORE.: NEVER TRUE

## 2024-12-19 SDOH — ECONOMIC STABILITY: TRANSPORTATION INSECURITY
IN THE PAST 12 MONTHS, HAS LACK OF TRANSPORTATION KEPT YOU FROM MEETINGS, WORK, OR FROM GETTING THINGS NEEDED FOR DAILY LIVING?: NO

## 2024-12-19 ASSESSMENT — PATIENT HEALTH QUESTIONNAIRE - PHQ9
SUM OF ALL RESPONSES TO PHQ QUESTIONS 1-9: 0
1. LITTLE INTEREST OR PLEASURE IN DOING THINGS: NOT AT ALL
SUM OF ALL RESPONSES TO PHQ QUESTIONS 1-9: 0
SUM OF ALL RESPONSES TO PHQ9 QUESTIONS 1 & 2: 0
SUM OF ALL RESPONSES TO PHQ QUESTIONS 1-9: 0
2. FEELING DOWN, DEPRESSED OR HOPELESS: NOT AT ALL
SUM OF ALL RESPONSES TO PHQ QUESTIONS 1-9: 0

## 2024-12-19 NOTE — PROGRESS NOTES
Alcon presents for nagging cough.  Present now for several weeks and or month.  Known history of GERD.  Also wants referral for picking up with GI physician.    Denies fevers chills sweats.  Using losartan for blood pressure only known history of ACE inhibitor induced cough in the past.    Negative for:     Worry / mood complaints  Headache  Dizziness  Visual Disturbance  Hearing Changes  Nasal / sinus Symptoms  Mouth / tooth symptom, pain  Throat pain  Difficulty swallowing  Neck pain  Chest discomfort  Cough  SOB  N/V/D/C  Pelvic area discomfort  Bladder / voiding discomfort  Bowel complaints  MSk complaints   Numbness/tingling/abnormal sensations   Edema / Leg swelling  Dizziness  Fatigue  Bleeding   Skin    Pertinent Pos: See HPI -cough as noted above    Vitals:    12/19/24 0903   BP: 118/82   Pulse: 94   SpO2: 97%       Alert and oriented to PPT  NAD    HEENT - neg  Neck - no bruits, no lymphadenopathy  Chest  HRRR w/o murmer  LCTAB no wheezes / rhonchi  Gait / Station - stable, no dysequilibrium, uniform pace, no assist device, cane.     Diagnosis Orders   1. Subacute cough  Full PFT Study With Bronchodilator    XR CHEST STANDARD (2 VW)      2. Need for influenza vaccination  Influenza, FLUCELVAX Trivalent, (age 6 mo+) IM, Preservative Free, 0.5mL      3. Mild intermittent reactive airway disease with wheezing without complication  Full PFT Study With Bronchodilator    XR CHEST STANDARD (2 VW)      4. GERD without esophagitis  Felipe Simmons MD, Gastroenterology, [place]      5. Seasonal allergies  fexofenadine (ALLEGRA) 180 MG tablet          Plan:  1.)  Diagnostic workup for reactive airway disease  2.)  Possible overlapping exacerbation with reflux  3.)  Moderate overweight BMI 36-discussed weight loss and possible GLP-1.  4.)  Initiate antihistamine as per previous, Allegra 180 mg daily.  May add ibuprofen 400 mg daily with it.    If cough persist may consider discontinuation of ARB and

## 2024-12-19 NOTE — PROGRESS NOTES
Vaccine Information Sheet, \"Influenza - Inactivated\"  given to Alcon Yates, or parent/legal guardian of  Alcon Yates and verbalized understanding.    Patient responses:    Have you ever had a reaction to a flu vaccine? Yes  Are you able to eat eggs without adverse effects?  yes  Do you have any current illness?  No  Have you ever had Guillian Crofton Syndrome?  No    Flu vaccine given per order. Please see immunization tab.

## 2024-12-20 ENCOUNTER — TELEPHONE (OUTPATIENT)
Dept: GASTROENTEROLOGY | Age: 44
End: 2024-12-20

## 2025-01-21 ENCOUNTER — OFFICE VISIT (OUTPATIENT)
Dept: PRIMARY CARE CLINIC | Age: 45
End: 2025-01-21
Payer: COMMERCIAL

## 2025-01-21 VITALS
HEART RATE: 78 BPM | SYSTOLIC BLOOD PRESSURE: 132 MMHG | BODY MASS INDEX: 37.57 KG/M2 | HEIGHT: 72 IN | DIASTOLIC BLOOD PRESSURE: 80 MMHG | OXYGEN SATURATION: 98 % | WEIGHT: 277.4 LBS

## 2025-01-21 DIAGNOSIS — R05.2 SUBACUTE COUGH: Primary | ICD-10-CM

## 2025-01-21 DIAGNOSIS — J45.20 MILD INTERMITTENT REACTIVE AIRWAY DISEASE WITH WHEEZING WITHOUT COMPLICATION: ICD-10-CM

## 2025-01-21 PROCEDURE — 99213 OFFICE O/P EST LOW 20 MIN: CPT | Performed by: FAMILY MEDICINE

## 2025-01-21 PROCEDURE — 3075F SYST BP GE 130 - 139MM HG: CPT | Performed by: FAMILY MEDICINE

## 2025-01-21 PROCEDURE — 3079F DIAST BP 80-89 MM HG: CPT | Performed by: FAMILY MEDICINE

## 2025-01-21 SDOH — ECONOMIC STABILITY: FOOD INSECURITY: WITHIN THE PAST 12 MONTHS, YOU WORRIED THAT YOUR FOOD WOULD RUN OUT BEFORE YOU GOT MONEY TO BUY MORE.: NEVER TRUE

## 2025-01-21 SDOH — ECONOMIC STABILITY: FOOD INSECURITY: WITHIN THE PAST 12 MONTHS, THE FOOD YOU BOUGHT JUST DIDN'T LAST AND YOU DIDN'T HAVE MONEY TO GET MORE.: NEVER TRUE

## 2025-01-21 ASSESSMENT — PATIENT HEALTH QUESTIONNAIRE - PHQ9
SUM OF ALL RESPONSES TO PHQ QUESTIONS 1-9: 0
2. FEELING DOWN, DEPRESSED OR HOPELESS: NOT AT ALL
SUM OF ALL RESPONSES TO PHQ QUESTIONS 1-9: 0
SUM OF ALL RESPONSES TO PHQ9 QUESTIONS 1 & 2: 0
1. LITTLE INTEREST OR PLEASURE IN DOING THINGS: NOT AT ALL
SUM OF ALL RESPONSES TO PHQ QUESTIONS 1-9: 0
SUM OF ALL RESPONSES TO PHQ QUESTIONS 1-9: 0

## 2025-01-21 NOTE — PROGRESS NOTES
Cough improved / resolved  Hold off request - regarding pulmonary orders placed at last visit.  Sees GI soon    Negative for:     Worry / mood complaints  Headache  Dizziness  Visual Disturbance  Hearing Changes  Nasal / sinus Symptoms  Mouth / tooth symptom, pain  Throat pain  Difficulty swallowing  Neck pain  Chest discomfort  Cough  SOB  N/V/D/C  Pelvic area discomfort  Bladder / voiding discomfort  Bowel complaints  MSk complaints   Numbness/tingling/abnormal sensations   Edema / Leg swelling  Dizziness  Fatigue  Bleeding   Skin    Pertinent Pos: See HPI - as above    Vitals:    01/21/25 0929   BP: 132/80   Pulse: 78   SpO2: 98%       Alert and oriented to PPT  NAD    HEENT - neg  Neck - no bruits, no lymphadenopathy  Chest  HRRR w/o murmer  LCTAB no wheezes / rhonchi  Gait / Station - stable, no dysequilibrium, uniform pace, no assist device, cane.     Diagnosis Orders   1. Subacute cough        2. Mild intermittent reactive airway disease with wheezing without complication            Plan:  1.)  cough resolved  2.)  fu as needed

## 2025-02-19 ENCOUNTER — OFFICE VISIT (OUTPATIENT)
Dept: GASTROENTEROLOGY | Age: 45
End: 2025-02-19
Payer: COMMERCIAL

## 2025-02-19 ENCOUNTER — PREP FOR PROCEDURE (OUTPATIENT)
Dept: GASTROENTEROLOGY | Age: 45
End: 2025-02-19

## 2025-02-19 ENCOUNTER — TELEPHONE (OUTPATIENT)
Dept: GASTROENTEROLOGY | Age: 45
End: 2025-02-19

## 2025-02-19 VITALS
WEIGHT: 275 LBS | HEIGHT: 72 IN | RESPIRATION RATE: 20 BRPM | SYSTOLIC BLOOD PRESSURE: 147 MMHG | TEMPERATURE: 97.8 F | DIASTOLIC BLOOD PRESSURE: 88 MMHG | BODY MASS INDEX: 37.25 KG/M2 | OXYGEN SATURATION: 97 % | HEART RATE: 97 BPM

## 2025-02-19 DIAGNOSIS — R10.13 DYSPEPSIA: ICD-10-CM

## 2025-02-19 DIAGNOSIS — Z12.11 COLON CANCER SCREENING: Primary | ICD-10-CM

## 2025-02-19 DIAGNOSIS — Z80.0 FAMILY HISTORY OF COLON CANCER: Primary | ICD-10-CM

## 2025-02-19 PROCEDURE — 3077F SYST BP >= 140 MM HG: CPT | Performed by: INTERNAL MEDICINE

## 2025-02-19 PROCEDURE — 99214 OFFICE O/P EST MOD 30 MIN: CPT | Performed by: INTERNAL MEDICINE

## 2025-02-19 PROCEDURE — 3079F DIAST BP 80-89 MM HG: CPT | Performed by: INTERNAL MEDICINE

## 2025-02-19 ASSESSMENT — ENCOUNTER SYMPTOMS
VOMITING: 0
ANAL BLEEDING: 0
TROUBLE SWALLOWING: 0
WHEEZING: 0
ABDOMINAL DISTENTION: 1
SORE THROAT: 1
DIARRHEA: 0
COLOR CHANGE: 0
ABDOMINAL PAIN: 1
CHOKING: 0
VOICE CHANGE: 0
CONSTIPATION: 0
BLOOD IN STOOL: 0
RECTAL PAIN: 0
NAUSEA: 0
COUGH: 1

## 2025-02-19 NOTE — PROGRESS NOTES
Reason for Referral:       No referring provider defined for this encounter.    Chief Complaint   Patient presents with    Gastroesophageal Reflux    New Patient           HISTORY OF PRESENT ILLNESS: Mr.Santos LUZ MARIA Yates is a 45 y.o. male with a past history remarkable for GERD, hypertension, family history of colon cancer in mother who is here for a follow-up.  The patient was previously being followed for chronic GERD.  He had an EGD that showed gastritis and gastric intestinal metaplasia.  He was started on PPI therapy and was subsequently tapered off.  The patient reports that in the last few weeks he has had very sporadic dyspepsia symptoms for which she is taking Pepcid and has good clinical control.  Also reports that most recently he had flulike symptoms for which she took Allegra.  Denies any other symptoms.  Reports mother had colon cancer and he was started with colon cancer screening at the age of 39.  He is currently due.      Previous Endoscopies    EGD 2022:  Gastritis, biopsies obtained  Normal esophagus, biopsies obtained    A.  STOMACH, BIOPSY:   -GASTRIC ANTRAL AND BODY TYPE MUCOSA WITH MILD CHRONIC GASTRITIS.   -H. PYLORI STAIN IS NEGATIVE.  CONTROL REACTS AS EXPECTED.     2.  ESOPHAGUS, BIOPSY:   -UNREMARKABLE SQUAMOUS MUCOSA.     Colonoscopy 2020:  Good bowel prep.  Otherwise normal colonoscopy    EGD 2020:  Normal esophagus, biopsies obtained.  Normal stomach, biopsies obtained to rule out H. pylori  SPECIMEN \"A\":  STOMACH, BIOPSY:                                 OXYNTIC-TYPE GASTRIC MUCOSA WITH MINIMAL   CHRONIC                               INACTIVE GASTRITIS          NO HELICOBACTER PYLORI MICROORGANISMS ARE IDENTIFIED WITH   IMMUNOHISTOCHEMICAL STAIN     SPECIMEN \"B\":  ESOPHAGUS, BIOPSY:          ESOPHAGEAL SQUAMOUS EPITHELIUM WITH REACTIVE CHANGE               NO GLANDULAR MUCOSA PRESENT       Previous GI workup       Past Medical,Family, and Social History reviewed and does not

## 2025-02-19 NOTE — TELEPHONE ENCOUNTER
Pt saw Dr. Narayanan today in clinic. Colonoscopy ordered. Pt does not take blood thinners or GLP-1 medications. Pt would like procedure at [facility].     Procedure scheduled/Dr Narayanan  Procedure: Colonoscopy (Screening)  Dx: Family hx of colon cancer  Date: Wednesday 03/19/25  Time: 9:45 am/Arrive at 8:15 am  Hospital: [facility]; Entrance C  PAT Phone Call: N/A  Bowel Prep instructions given: SUPREP Bowel Prep  In office/via phone: in office  Clearance needed: N/A  GLP-1: N/A    Pt informed it is required they must have a /responsible adult that takes them to their procedure, stays at the hospital (before, during, and after procedure), and drives pt home. Pt informed /responsible adult must stay inside the hospital during their procedure. Pt voiced understanding of  protocol for procedure.

## 2025-02-19 NOTE — H&P (VIEW-ONLY)
Reason for Referral:       No referring provider defined for this encounter.    Chief Complaint   Patient presents with    Gastroesophageal Reflux    New Patient           HISTORY OF PRESENT ILLNESS: Mr.Santos LUZ MARIA Yates is a 45 y.o. male with a past history remarkable for GERD, hypertension, family history of colon cancer in mother who is here for a follow-up.  The patient was previously being followed for chronic GERD.  He had an EGD that showed gastritis and gastric intestinal metaplasia.  He was started on PPI therapy and was subsequently tapered off.  The patient reports that in the last few weeks he has had very sporadic dyspepsia symptoms for which she is taking Pepcid and has good clinical control.  Also reports that most recently he had flulike symptoms for which she took Allegra.  Denies any other symptoms.  Reports mother had colon cancer and he was started with colon cancer screening at the age of 39.  He is currently due.      Previous Endoscopies    EGD 2022:  Gastritis, biopsies obtained  Normal esophagus, biopsies obtained    A.  STOMACH, BIOPSY:   -GASTRIC ANTRAL AND BODY TYPE MUCOSA WITH MILD CHRONIC GASTRITIS.   -H. PYLORI STAIN IS NEGATIVE.  CONTROL REACTS AS EXPECTED.     2.  ESOPHAGUS, BIOPSY:   -UNREMARKABLE SQUAMOUS MUCOSA.     Colonoscopy 2020:  Good bowel prep.  Otherwise normal colonoscopy    EGD 2020:  Normal esophagus, biopsies obtained.  Normal stomach, biopsies obtained to rule out H. pylori  SPECIMEN \"A\":  STOMACH, BIOPSY:                                 OXYNTIC-TYPE GASTRIC MUCOSA WITH MINIMAL   CHRONIC                               INACTIVE GASTRITIS          NO HELICOBACTER PYLORI MICROORGANISMS ARE IDENTIFIED WITH   IMMUNOHISTOCHEMICAL STAIN     SPECIMEN \"B\":  ESOPHAGUS, BIOPSY:          ESOPHAGEAL SQUAMOUS EPITHELIUM WITH REACTIVE CHANGE               NO GLANDULAR MUCOSA PRESENT       Previous GI workup       Past Medical,Family, and Social History reviewed and does not  Future    Dyspepsia             RTC:As needed    Additional comments:          Thank you for allowing me to participate in the care of Mr. Yates. For any further questions please do not hesitate to contact me.      I have reviewed and agree with the MA/LPN ROS please refer to their documentation from today's encounter on a separate note.     Felipe Narayanan MD  Gastroenterology & Hepatology  Office #: [phone]          this note is created with the assistance of a speech recognition program.  While intending to generate a document that actually reflects the content of the visit, the document can still have some errors including those of syntax and sound a like substitutions which may escape proof reading.  It such instances, actual meaning can be extrapolated by contextual diversion.

## 2025-02-20 RX ORDER — SODIUM, POTASSIUM,MAG SULFATES 17.5-3.13G
SOLUTION, RECONSTITUTED, ORAL ORAL
Qty: 1 EACH | Refills: 0 | Status: SHIPPED | OUTPATIENT
Start: 2025-02-20

## 2025-03-17 NOTE — PROGRESS NOTES
PAT phone call for colonoscopy completed with pt.    Date/time/location (entrance C) of surgery/procedure verified with pt.    NPO after MN status verified with pt.    Need for  (  x )  verified with pt.    Verified need to complete bowel prep/instructions per .    Instructed pt to take a shower the AM of surgery/procedure and to avoid lotions, creams, jewelry.    Instructed pt to take BP meds with a small sip of water prior to procedure/surgery.

## 2025-03-18 ENCOUNTER — PREP FOR PROCEDURE (OUTPATIENT)
Dept: GASTROENTEROLOGY | Age: 45
End: 2025-03-18

## 2025-03-18 ENCOUNTER — ANESTHESIA EVENT (OUTPATIENT)
Dept: OPERATING ROOM | Age: 45
End: 2025-03-18
Payer: COMMERCIAL

## 2025-03-18 ENCOUNTER — TELEPHONE (OUTPATIENT)
Dept: GASTROENTEROLOGY | Age: 45
End: 2025-03-18

## 2025-03-18 DIAGNOSIS — R10.13 DYSPEPSIA: ICD-10-CM

## 2025-03-18 NOTE — TELEPHONE ENCOUNTER
Return call back to patient in regards to questions. Patient stated that he has had an EGD in the past with Dr. MAGGY Chen and He is scheduled to have his Colonoscopy tomorrow at 945 am with arrival time at 815 am. Patient was inquiring if he should have an EGD as well with the Colonoscopy with his issues with Acid Reflux. Writer noted that with Patho Report and Previous note with MAGGY Chen there is no specific time frame noted for patient to have another EGD but that writer will send this to Dr. Narayanan to further evaluate if this is something that can be added on for the patient to complete with Colonoscopy tomorrow and will notify patient back in regards to this. Patient thanked writer. Please advise.

## 2025-03-19 ENCOUNTER — ANESTHESIA (OUTPATIENT)
Dept: OPERATING ROOM | Age: 45
End: 2025-03-19
Payer: COMMERCIAL

## 2025-03-19 ENCOUNTER — HOSPITAL ENCOUNTER (OUTPATIENT)
Age: 45
Setting detail: OUTPATIENT SURGERY
Discharge: HOME | End: 2025-03-19
Attending: INTERNAL MEDICINE | Admitting: INTERNAL MEDICINE
Payer: COMMERCIAL

## 2025-03-19 VITALS
SYSTOLIC BLOOD PRESSURE: 129 MMHG | TEMPERATURE: 97.5 F | DIASTOLIC BLOOD PRESSURE: 93 MMHG | OXYGEN SATURATION: 96 % | HEIGHT: 72 IN | WEIGHT: 269 LBS | RESPIRATION RATE: 21 BRPM | HEART RATE: 74 BPM | BODY MASS INDEX: 36.44 KG/M2

## 2025-03-19 DIAGNOSIS — R10.13 DYSPEPSIA: ICD-10-CM

## 2025-03-19 DIAGNOSIS — Z80.0 FAMILY HISTORY OF COLON CANCER: ICD-10-CM

## 2025-03-19 PROCEDURE — 45385 COLONOSCOPY W/LESION REMOVAL: CPT | Performed by: INTERNAL MEDICINE

## 2025-03-19 PROCEDURE — 2709999900 HC NON-CHARGEABLE SUPPLY: Performed by: INTERNAL MEDICINE

## 2025-03-19 PROCEDURE — 7100000010 HC PHASE II RECOVERY - FIRST 15 MIN: Performed by: INTERNAL MEDICINE

## 2025-03-19 PROCEDURE — 3700000000 HC ANESTHESIA ATTENDED CARE: Performed by: INTERNAL MEDICINE

## 2025-03-19 PROCEDURE — 3609012400 HC EGD TRANSORAL BIOPSY SINGLE/MULTIPLE: Performed by: INTERNAL MEDICINE

## 2025-03-19 PROCEDURE — 43239 EGD BIOPSY SINGLE/MULTIPLE: CPT | Performed by: INTERNAL MEDICINE

## 2025-03-19 PROCEDURE — 3700000001 HC ADD 15 MINUTES (ANESTHESIA): Performed by: INTERNAL MEDICINE

## 2025-03-19 PROCEDURE — 2500000003 HC RX 250 WO HCPCS: Performed by: NURSE ANESTHETIST, CERTIFIED REGISTERED

## 2025-03-19 PROCEDURE — 2580000003 HC RX 258: Performed by: STUDENT IN AN ORGANIZED HEALTH CARE EDUCATION/TRAINING PROGRAM

## 2025-03-19 PROCEDURE — 7100000011 HC PHASE II RECOVERY - ADDTL 15 MIN: Performed by: INTERNAL MEDICINE

## 2025-03-19 PROCEDURE — 88305 TISSUE EXAM BY PATHOLOGIST: CPT

## 2025-03-19 PROCEDURE — 3609010600 HC COLONOSCOPY POLYPECTOMY SNARE/COLD BIOPSY: Performed by: INTERNAL MEDICINE

## 2025-03-19 PROCEDURE — 6360000002 HC RX W HCPCS: Performed by: NURSE ANESTHETIST, CERTIFIED REGISTERED

## 2025-03-19 RX ORDER — SODIUM CHLORIDE 0.9 % (FLUSH) 0.9 %
5-40 SYRINGE (ML) INJECTION PRN
Status: DISCONTINUED | OUTPATIENT
Start: 2025-03-19 | End: 2025-03-19 | Stop reason: HOSPADM

## 2025-03-19 RX ORDER — PROCHLORPERAZINE EDISYLATE 5 MG/ML
5 INJECTION INTRAMUSCULAR; INTRAVENOUS
Status: DISCONTINUED | OUTPATIENT
Start: 2025-03-19 | End: 2025-03-19 | Stop reason: HOSPADM

## 2025-03-19 RX ORDER — LIDOCAINE HYDROCHLORIDE 10 MG/ML
INJECTION, SOLUTION EPIDURAL; INFILTRATION; INTRACAUDAL; PERINEURAL
Status: DISCONTINUED | OUTPATIENT
Start: 2025-03-19 | End: 2025-03-19 | Stop reason: SDUPTHER

## 2025-03-19 RX ORDER — SODIUM CHLORIDE 0.9 % (FLUSH) 0.9 %
5-40 SYRINGE (ML) INJECTION EVERY 12 HOURS SCHEDULED
Status: DISCONTINUED | OUTPATIENT
Start: 2025-03-19 | End: 2025-03-19 | Stop reason: HOSPADM

## 2025-03-19 RX ORDER — PROPOFOL 10 MG/ML
INJECTION, EMULSION INTRAVENOUS
Status: DISCONTINUED | OUTPATIENT
Start: 2025-03-19 | End: 2025-03-19 | Stop reason: SDUPTHER

## 2025-03-19 RX ORDER — SODIUM CHLORIDE 9 MG/ML
INJECTION, SOLUTION INTRAVENOUS PRN
Status: DISCONTINUED | OUTPATIENT
Start: 2025-03-19 | End: 2025-03-19 | Stop reason: HOSPADM

## 2025-03-19 RX ORDER — DEXMEDETOMIDINE HYDROCHLORIDE 100 UG/ML
INJECTION, SOLUTION INTRAVENOUS
Status: DISCONTINUED | OUTPATIENT
Start: 2025-03-19 | End: 2025-03-19 | Stop reason: SDUPTHER

## 2025-03-19 RX ORDER — HYDRALAZINE HYDROCHLORIDE 20 MG/ML
10 INJECTION INTRAMUSCULAR; INTRAVENOUS
Status: DISCONTINUED | OUTPATIENT
Start: 2025-03-19 | End: 2025-03-19 | Stop reason: HOSPADM

## 2025-03-19 RX ORDER — SODIUM CHLORIDE, SODIUM LACTATE, POTASSIUM CHLORIDE, CALCIUM CHLORIDE 600; 310; 30; 20 MG/100ML; MG/100ML; MG/100ML; MG/100ML
INJECTION, SOLUTION INTRAVENOUS CONTINUOUS
Status: DISCONTINUED | OUTPATIENT
Start: 2025-03-19 | End: 2025-03-19 | Stop reason: HOSPADM

## 2025-03-19 RX ORDER — LIDOCAINE HYDROCHLORIDE 10 MG/ML
1 INJECTION, SOLUTION EPIDURAL; INFILTRATION; INTRACAUDAL; PERINEURAL
Status: DISCONTINUED | OUTPATIENT
Start: 2025-03-19 | End: 2025-03-19 | Stop reason: HOSPADM

## 2025-03-19 RX ORDER — LABETALOL HYDROCHLORIDE 5 MG/ML
10 INJECTION, SOLUTION INTRAVENOUS
Status: DISCONTINUED | OUTPATIENT
Start: 2025-03-19 | End: 2025-03-19 | Stop reason: HOSPADM

## 2025-03-19 RX ORDER — NALOXONE HYDROCHLORIDE 0.4 MG/ML
INJECTION, SOLUTION INTRAMUSCULAR; INTRAVENOUS; SUBCUTANEOUS PRN
Status: DISCONTINUED | OUTPATIENT
Start: 2025-03-19 | End: 2025-03-19 | Stop reason: HOSPADM

## 2025-03-19 RX ADMIN — PROPOFOL 100 MG: 10 INJECTION, EMULSION INTRAVENOUS at 10:09

## 2025-03-19 RX ADMIN — PROPOFOL 140 MCG/KG/MIN: 10 INJECTION, EMULSION INTRAVENOUS at 10:09

## 2025-03-19 RX ADMIN — SODIUM CHLORIDE, POTASSIUM CHLORIDE, SODIUM LACTATE AND CALCIUM CHLORIDE: 600; 310; 30; 20 INJECTION, SOLUTION INTRAVENOUS at 08:56

## 2025-03-19 RX ADMIN — LIDOCAINE HYDROCHLORIDE 100 MG: 10 INJECTION, SOLUTION EPIDURAL; INFILTRATION; INTRACAUDAL; PERINEURAL at 10:09

## 2025-03-19 RX ADMIN — DEXMEDETOMIDINE HCL 8 MCG: 100 INJECTION INTRAVENOUS at 10:09

## 2025-03-19 ASSESSMENT — PAIN - FUNCTIONAL ASSESSMENT: PAIN_FUNCTIONAL_ASSESSMENT: 0-10

## 2025-03-19 NOTE — OP NOTE
EGD report    Esophagogastroduodenoscopy (EGD) Procedure Note    Procedure:  EGD with Biopsies    Procedure Date: 3/19/2025    Indications:  Dyspepsia    Sedation:  MAC    Attending Physician:  Dr. Felipe Narayanan MD    Assistant:  None    Procedure Details:    Informed consent was obtained for the procedure, including sedation. Risks of infection, perforation, hemorrhage, adverse drug reaction, and aspiration were discussed. The patient was placed in the left lateral decubitus position. The patient was monitored continuously with ECG tracing, pulse oximetry, blood pressure monitoring, and direct observation.      The gastroscope was inserted into the mouth and advanced under direct vision to second portion of the duodenum.  A careful inspection was made as the gastroscope was withdrawn, including a retroflexed view of the proximal stomach; findings and interventions are described below. Appropriate photodocumentation was obtained.    Findings:  Retropharyngeal area was grossly normal appearing     Esophagus: normal, biopsies obtained to rule out EOE/GERD                          Esophagogastric markings: Diaphragmatic hiatus- 40 cm; GE junction-40 cm     Stomach:    Fundus: normal    Body: normal    Antrum: Mild subtle erosions, biopsies obtained to rule out H. pylori     Duodenum:     Bulb: normal    First part: Normal    Second Part: Normal  Biopsies obtained to rule out celiac disease    Complications:  None           Estimated blood loss: Minimal    Disposition: Home           Condition: Stable    Specimen Removed: Esophagus, stomach, duodenum    Impression:    Mild erosive gastropathy, biopsies obtained to rule out H. pylori.  Normal esophagus and duodenum, biopsies obtained to rule out GERD/EOE and celiac disease.    Recommendations:  Follow pathology results.  Avoid NSAIDs.  Trial of Protonix 40 mg daily before breakfast.  Proceed with colonoscopy today    Attending Attestation: I performed the 
screening/surveillance in 6 to 12 months with a 2-day bowel prep.    Attending Attestation: I performed the procedure.    Felipe Narayanan MD

## 2025-03-19 NOTE — ANESTHESIA PRE PROCEDURE
Department of Anesthesiology  Preprocedure Note       Name:  Alcon Yates   Age:  45 y.o.  :  1980                                          MRN:  [id number]         Date:  3/19/2025      Surgeon: Surgeon(s):  Felipe Narayanan MD    Procedure: Procedure(s):  ESOPHAGOGASTRODUODENOSCOPY BIOPSY  COLORECTAL CANCER SCREENING, HIGH RISK    Medications prior to admission:   Prior to Admission medications    Medication Sig Start Date End Date Taking? Authorizing Provider   losartan-hydroCHLOROthiazide (HYZAAR) 100-25 MG per tablet Take 1 tablet by mouth daily 24  Yes Odell Spear DO   sodium-potassium-mag sulfate (SUPREP BOWEL PREP KIT) 17.5-3.13-1.6 GM/177ML SOLN solution Take as directed for bowel prep. 25   Felipe Narayanan MD   fexofenadine (ALLEGRA) 180 MG tablet Take 1 tablet by mouth daily 24   Odell Spear DO       Current medications:    Current Facility-Administered Medications   Medication Dose Route Frequency Provider Last Rate Last Admin    lidocaine PF 1 % injection 1 mL  1 mL IntraDERmal Once PRN Robbin Fields MD        lactated ringers infusion   IntraVENous Continuous Robbin Fields MD        sodium chloride flush 0.9 % injection 5-40 mL  5-40 mL IntraVENous 2 times per day Robbin Fields MD        sodium chloride flush 0.9 % injection 5-40 mL  5-40 mL IntraVENous PRN Robbin Fields MD        0.9 % sodium chloride infusion   IntraVENous PRN Robbin Fields MD           Allergies:    Allergies   Allergen Reactions    Ace Inhibitors      cough       Problem List:    Patient Active Problem List   Diagnosis Code    Essential hypertension I10    Family history of colon cancer Z80.0    GERD without esophagitis K21.9    Gastritis without bleeding K29.70    Severe obesity (BMI 35.0-39.9) with comorbidity E66.01    Dyspepsia R10.13       Past Medical History:        Diagnosis Date    GERD (gastroesophageal reflux disease)     H/O cardiovascular stress test 2020    SMALL

## 2025-03-19 NOTE — ANESTHESIA POSTPROCEDURE EVALUATION
Department of Anesthesiology  Postprocedure Note    Patient: Alcon Yates  MRN: [id number]  YOB: 1980  Date of evaluation: 3/19/2025    Procedure Summary       Date: 03/19/25 Room / Location: [facility] PROCEDURE ROOM / [facility]    Anesthesia Start: 1006 Anesthesia Stop: 1034    Procedures:       ESOPHAGOGASTRODUODENOSCOPY WITH STOMACH BIOPSY, DUODENUM BIOPSY AND ESOPHAGEAL BIOPSY      COLONOSCOPY WITH TRANSVERSE COLON POLYPECTOMY Diagnosis:       Family history of colon cancer      Dyspepsia      (Family history of colon cancer [Z80.0])      (Dyspepsia [R10.13])    Surgeons: Felipe Narayanan MD Responsible Provider: Robbin Fields MD    Anesthesia Type: TIVA ASA Status: 2            Anesthesia Type: No value filed.    Pako Phase I: Pako Score: 10    Pako Phase II: Pako Score: 10    Anesthesia Post Evaluation    Patient location during evaluation: PACU  Patient participation: complete - patient participated  Level of consciousness: awake and awake and alert  Pain score: 0  Nausea & Vomiting: no nausea and no vomiting  Cardiovascular status: hemodynamically stable and blood pressure returned to baseline  Respiratory status: acceptable  Hydration status: euvolemic  Multimodal analgesia pain management approach  Pain management: adequate    No notable events documented.

## 2025-03-19 NOTE — INTERVAL H&P NOTE
Update History & Physical    The patient's History and Physical of February 19, 2025 was reviewed with the patient and I examined the patient. The patient reported worsening dyspepsia for which EGD was discussed and patient was agreeable. The surgical site was confirmed by the patient and me.     Plan: The risks, benefits, expected outcome, and alternative to the recommended procedure have been discussed with the patient. Patient understands and wants to proceed with the procedure.     Electronically signed by Felipe Narayanan MD on 3/19/2025 at 8:17 AM

## 2025-03-19 NOTE — DISCHARGE INSTRUCTIONS

## 2025-03-20 ENCOUNTER — RESULTS FOLLOW-UP (OUTPATIENT)
Dept: OPERATING ROOM | Age: 45
End: 2025-03-20

## 2025-03-20 LAB — SURGICAL PATHOLOGY REPORT: NORMAL

## 2025-06-10 ENCOUNTER — OFFICE VISIT (OUTPATIENT)
Dept: PRIMARY CARE CLINIC | Age: 45
End: 2025-06-10
Payer: COMMERCIAL

## 2025-06-10 ENCOUNTER — HOSPITAL ENCOUNTER (OUTPATIENT)
Age: 45
Setting detail: SPECIMEN
Discharge: HOME | End: 2025-06-10

## 2025-06-10 VITALS
SYSTOLIC BLOOD PRESSURE: 132 MMHG | HEART RATE: 80 BPM | DIASTOLIC BLOOD PRESSURE: 80 MMHG | BODY MASS INDEX: 37.22 KG/M2 | HEIGHT: 72 IN | WEIGHT: 274.8 LBS | OXYGEN SATURATION: 94 %

## 2025-06-10 DIAGNOSIS — R31.29 OTHER MICROSCOPIC HEMATURIA: ICD-10-CM

## 2025-06-10 DIAGNOSIS — E03.8 OTHER SPECIFIED HYPOTHYROIDISM: ICD-10-CM

## 2025-06-10 DIAGNOSIS — E78.2 MIXED HYPERLIPIDEMIA: ICD-10-CM

## 2025-06-10 DIAGNOSIS — I10 ESSENTIAL HYPERTENSION: ICD-10-CM

## 2025-06-10 DIAGNOSIS — K64.1 GRADE II HEMORRHOIDS: Primary | ICD-10-CM

## 2025-06-10 DIAGNOSIS — Z00.00 ENCOUNTER FOR WELL ADULT EXAM WITHOUT ABNORMAL FINDINGS: ICD-10-CM

## 2025-06-10 LAB
ALBUMIN SERPL-MCNC: 4.2 G/DL (ref 3.5–5.2)
ALBUMIN/GLOB SERPL: 1.4 {RATIO} (ref 1–2.5)
ALP SERPL-CCNC: 100 U/L (ref 40–129)
ALT SERPL-CCNC: 36 U/L (ref 10–50)
ANION GAP SERPL CALCULATED.3IONS-SCNC: 14 MMOL/L (ref 9–16)
AST SERPL-CCNC: 27 U/L (ref 10–50)
BILIRUB SERPL-MCNC: 0.4 MG/DL (ref 0–1.2)
BILIRUB UR QL STRIP: NEGATIVE
BUN SERPL-MCNC: 17 MG/DL (ref 6–20)
CALCIUM SERPL-MCNC: 9.3 MG/DL (ref 8.6–10.4)
CASTS #/AREA URNS LPF: ABNORMAL /LPF (ref 0–2)
CASTS #/AREA URNS LPF: ABNORMAL /LPF (ref 0–2)
CHLORIDE SERPL-SCNC: 103 MMOL/L (ref 98–107)
CHOLEST SERPL-MCNC: 239 MG/DL (ref 0–199)
CHOLESTEROL/HDL RATIO: 8.9
CLARITY UR: CLEAR
CO2 SERPL-SCNC: 23 MMOL/L (ref 20–31)
COLOR UR: YELLOW
CREAT SERPL-MCNC: 0.9 MG/DL (ref 0.7–1.2)
CRYSTALS URNS MICRO: ABNORMAL /HPF
CRYSTALS URNS MICRO: ABNORMAL /HPF
EPI CELLS #/AREA URNS HPF: ABNORMAL /HPF (ref 0–5)
GFR, ESTIMATED: >90 ML/MIN/1.73M2
GLUCOSE SERPL-MCNC: 105 MG/DL (ref 74–99)
GLUCOSE UR STRIP-MCNC: NEGATIVE MG/DL
HDLC SERPL-MCNC: 27 MG/DL
HGB UR QL STRIP.AUTO: ABNORMAL
KETONES UR STRIP-MCNC: ABNORMAL MG/DL
LDLC SERPL CALC-MCNC: ABNORMAL MG/DL (ref 0–100)
LDLC SERPL DIRECT ASSAY-MCNC: 89 MG/DL
LEUKOCYTE ESTERASE UR QL STRIP: NEGATIVE
MUCOUS THREADS URNS QL MICRO: ABNORMAL
NITRITE UR QL STRIP: NEGATIVE
PH UR STRIP: 5.5 [PH] (ref 5–8)
POTASSIUM SERPL-SCNC: 3.6 MMOL/L (ref 3.7–5.3)
PROT SERPL-MCNC: 7.3 G/DL (ref 6.6–8.7)
PROT UR STRIP-MCNC: ABNORMAL MG/DL
RBC #/AREA URNS HPF: ABNORMAL /HPF (ref 0–2)
SODIUM SERPL-SCNC: 140 MMOL/L (ref 136–145)
SP GR UR STRIP: 1.03 (ref 1–1.03)
TRIGL SERPL-MCNC: 713 MG/DL (ref 0–149)
TSH SERPL DL<=0.05 MIU/L-ACNC: 1.53 UIU/ML (ref 0.27–4.2)
UROBILINOGEN UR STRIP-ACNC: NORMAL EU/DL (ref 0–1)
VLDLC SERPL CALC-MCNC: ABNORMAL MG/DL (ref 1–30)
WBC #/AREA URNS HPF: ABNORMAL /HPF (ref 0–5)

## 2025-06-10 PROCEDURE — 3079F DIAST BP 80-89 MM HG: CPT | Performed by: FAMILY MEDICINE

## 2025-06-10 PROCEDURE — 3075F SYST BP GE 130 - 139MM HG: CPT | Performed by: FAMILY MEDICINE

## 2025-06-10 PROCEDURE — 99396 PREV VISIT EST AGE 40-64: CPT | Performed by: FAMILY MEDICINE

## 2025-06-10 RX ORDER — LOSARTAN POTASSIUM AND HYDROCHLOROTHIAZIDE 25; 100 MG/1; MG/1
1 TABLET ORAL DAILY
Qty: 90 TABLET | Refills: 3 | Status: SHIPPED | OUTPATIENT
Start: 2025-06-10

## 2025-06-10 SDOH — ECONOMIC STABILITY: FOOD INSECURITY: WITHIN THE PAST 12 MONTHS, THE FOOD YOU BOUGHT JUST DIDN'T LAST AND YOU DIDN'T HAVE MONEY TO GET MORE.: NEVER TRUE

## 2025-06-10 SDOH — ECONOMIC STABILITY: FOOD INSECURITY: WITHIN THE PAST 12 MONTHS, YOU WORRIED THAT YOUR FOOD WOULD RUN OUT BEFORE YOU GOT MONEY TO BUY MORE.: NEVER TRUE

## 2025-06-10 ASSESSMENT — PATIENT HEALTH QUESTIONNAIRE - PHQ9
SUM OF ALL RESPONSES TO PHQ QUESTIONS 1-9: 0
1. LITTLE INTEREST OR PLEASURE IN DOING THINGS: NOT AT ALL
SUM OF ALL RESPONSES TO PHQ QUESTIONS 1-9: 0
2. FEELING DOWN, DEPRESSED OR HOPELESS: NOT AT ALL
SUM OF ALL RESPONSES TO PHQ QUESTIONS 1-9: 0
SUM OF ALL RESPONSES TO PHQ QUESTIONS 1-9: 0

## 2025-06-10 NOTE — PROGRESS NOTES
2024-25 season) 09/01/2024      Recommendations for Preventive Services Due: see orders and patient instructions/AVS.

## 2025-06-10 NOTE — PATIENT INSTRUCTIONS
hands, brush your teeth twice a day, and wear a seat belt in the car.   Where can you learn more?  Go to [email or web address]://[email or web address]/patientEd and enter P072 to learn more about \"Well Visit, Ages 18 to 65: Care Instructions.\"  Current as of: April 30, 2024  Content Version: 14.5  © 5860-0866 [employer].   Care instructions adapted under license by [employer]. If you have questions about a medical condition or this instruction, always ask your healthcare professional. [employer] disclaims any warranty or liability for your use of this information.

## 2025-06-11 ENCOUNTER — RESULTS FOLLOW-UP (OUTPATIENT)
Age: 45
End: 2025-06-11

## (undated) DEVICE — DEFENDO AIR WATER SUCTION AND BIOPSY VALVE KIT FOR  OLYMPUS: Brand: DEFENDO AIR/WATER/SUCTION AND BIOPSY VALVE

## (undated) DEVICE — TOWEL,OR,DSP,ST,BLUE,DLX,XR,4/PK,20PK/CS: Brand: MEDLINE

## (undated) DEVICE — APPLICATOR MEDICATED 26 CC SOLUTION HI LT ORNG CHLORAPREP

## (undated) DEVICE — GLOVE ORANGE PI 7   MSG9070

## (undated) DEVICE — SKIN PREP TRAY W/CHG: Brand: MEDLINE INDUSTRIES, INC.

## (undated) DEVICE — HYPODERMIC SAFETY NEEDLE: Brand: MAGELLAN

## (undated) DEVICE — SNARE ENDOSCP AD L240CM LOOP W10MM SHTH DIA2.4MM RND INSUL

## (undated) DEVICE — YANKAUER,FLEXIBLE HANDLE,REGLR CAPACITY: Brand: MEDLINE INDUSTRIES, INC.

## (undated) DEVICE — [AGGRESSIVE PLUS CUTTER, ARTHROSCOPIC SHAVER BLADE,  DO NOT RESTERILIZE,  DO NOT USE IF PACKAGE IS DAMAGED,  KEEP DRY,  KEEP AWAY FROM SUNLIGHT]: Brand: FORMULA

## (undated) DEVICE — BITEBLOCK 54FR W/ DENT RIM BLOX

## (undated) DEVICE — FORCEPS BX L240CM JAW DIA22MM ORNG STD CAP W NDL RAD JAW 4

## (undated) DEVICE — POSITIONER HD W8XH4XL8.5IN RASPBERRY FOAM SLT

## (undated) DEVICE — GLOVE SURG SZ 75 CRM LTX FREE POLYISOPRENE POLYMER BEAD ANTI

## (undated) DEVICE — SUTURE NONABSORBABLE MONOFILAMENT 3-0 PS-1 18 IN BLK ETHILON 1663H

## (undated) DEVICE — [AUGER BUR, ARTHROSCOPIC SHAVER BLADE,  DO NOT RESTERILIZE,  DO NOT USE IF PACKAGE IS DAMAGED,  KEEP DRY,  KEEP AWAY FROM SUNLIGHT]: Brand: FORMULA

## (undated) DEVICE — FORCEPS BX L240CM JAW DIA2.8MM L CAP W/ NDL MIC MESH TOOTH

## (undated) DEVICE — Device

## (undated) DEVICE — BASIN EMSIS 700ML GRAPHITE PLAS KID SHP GRAD

## (undated) DEVICE — 4-PORT MANIFOLD: Brand: NEPTUNE 2

## (undated) DEVICE — TUBING, SUCTION, 1/4" X 12', STRAIGHT: Brand: MEDLINE

## (undated) DEVICE — SOLUTION IV IRRIG WATER 1000ML POUR BRL 2F7114

## (undated) DEVICE — SHOULDER SUSPENSION KIT 6 PER BOX

## (undated) DEVICE — BLANKET WRM W40.2XL55.9IN IORT LO BODY + MISTRAL AIR

## (undated) DEVICE — Device: Brand: DEFENDO VALVE AND CONNECTOR KIT

## (undated) DEVICE — ADHESIVE SKIN CLSR 0.7ML TOP DERMBND ADV

## (undated) DEVICE — GAUZE,SPONGE,3"X3",4PLY,NS,LF: Brand: MEDLINE

## (undated) DEVICE — DRESSING PETRO W3XL8IN OIL EMUL N ADH GZ KNIT IMPREG CELOS

## (undated) DEVICE — Z DISCONTINUED NO SUB IDED CAPSULE PH GASTROENTEROLOGY ES MON FOR REFLX DEL SYS BRAVO

## (undated) DEVICE — COVER,MAYO STAND,XL,STERILE: Brand: MEDLINE

## (undated) DEVICE — SYRINGE MED 50ML LUERLOCK TIP

## (undated) DEVICE — SOLUTION ANSEP 3% PEROXIDE 8OZ TOP NS LF

## (undated) DEVICE — GLOVE SURG 8 11.7IN BEAD CUF LIGHT BRN SENSICARE LTX FREE

## (undated) DEVICE — GARMENT,MEDLINE,DVT,INT,CALF,MED, GEN2: Brand: MEDLINE

## (undated) DEVICE — ENDO KIT W/SYRINGE: Brand: MEDLINE INDUSTRIES, INC.

## (undated) DEVICE — ADAPTER,CATHETER/SYRINGE/LUER,STERILE: Brand: MEDLINE

## (undated) DEVICE — ADAPTER CLEANING PORPOISE CLEANING

## (undated) DEVICE — TUBING INSUFFLATION CAP W/ EXT CARBON DIOX ENDO SMARTCAP

## (undated) DEVICE — GOWN,PREVENTION PLUS,XLN/XL,ST,24/CS: Brand: MEDLINE

## (undated) DEVICE — [FOUR SPIKE IRRIGATOR SET,  NON-PYROGENIC FLUID PATH,  DO NOT USE IF PACKAGE IS DAMAGED]

## (undated) DEVICE — SUTURE PDS II SZ 0 L36IN ABSRB VLT L36MM CT-1 1/2 CIR Z346H

## (undated) DEVICE — CONNECTOR TBNG AUX H2O JET DISP FOR OLY 160/180 SER

## (undated) DEVICE — JELLY,LUBE,STERILE,FLIP TOP,TUBE,2-OZ: Brand: MEDLINE

## (undated) DEVICE — DRAPE,U/ SHT,SPLIT,PLAS,STERIL: Brand: MEDLINE

## (undated) DEVICE — FORCEPS BX L240CM WRK CHN 2.8MM STD CAP W/ NDL MIC MESH

## (undated) DEVICE — PERRYSBURG ENDO PACK: Brand: MEDLINE INDUSTRIES, INC.

## (undated) DEVICE — GAUZE,SPONGE,FLUFF,6"X6.75",STRL,5/TRAY: Brand: MEDLINE

## (undated) DEVICE — Z DISCONTINUED USE 2751540 TUBING IRRIG L10IN DISP PMP ENDOGATOR

## (undated) DEVICE — CANNULA IV 18GA L15IN BLNT FILL LUERLOCK HUB MJCT

## (undated) DEVICE — POLYP TRAP: Brand: TRAPEASE®

## (undated) DEVICE — Z INACTIVE USE 2660664 SOLUTION IRRIG 3000ML 0.9% SOD CHL USP UROMATIC PLAS CONT

## (undated) DEVICE — TUBING, SUCTION, 3/16" X 10', STRAIGHT: Brand: MEDLINE

## (undated) DEVICE — SUPER TURBOVAC 90 INTEGRATED CABLE WAND ICW: Brand: COBLATION